# Patient Record
Sex: FEMALE | Race: BLACK OR AFRICAN AMERICAN | Employment: FULL TIME | URBAN - METROPOLITAN AREA
[De-identification: names, ages, dates, MRNs, and addresses within clinical notes are randomized per-mention and may not be internally consistent; named-entity substitution may affect disease eponyms.]

---

## 2017-04-25 ENCOUNTER — HOSPITAL ENCOUNTER (EMERGENCY)
Facility: HOSPITAL | Age: 52
Discharge: HOME | End: 2017-04-25
Payer: MEDICAID

## 2017-04-25 VITALS
RESPIRATION RATE: 18 BRPM | DIASTOLIC BLOOD PRESSURE: 70 MMHG | TEMPERATURE: 98 F | BODY MASS INDEX: 36.96 KG/M2 | SYSTOLIC BLOOD PRESSURE: 134 MMHG | HEIGHT: 66 IN | WEIGHT: 230 LBS | HEART RATE: 64 BPM | OXYGEN SATURATION: 100 %

## 2017-04-25 DIAGNOSIS — M54.16 LUMBAR RADICULOPATHY: Primary | ICD-10-CM

## 2017-04-25 PROCEDURE — 99283 EMERGENCY DEPT VISIT LOW MDM: CPT

## 2017-04-25 RX ORDER — PREDNISONE 20 MG/1
60 TABLET ORAL DAILY
Qty: 12 TABLET | Refills: 0 | Status: SHIPPED | OUTPATIENT
Start: 2017-04-25 | End: 2017-04-29

## 2017-04-25 RX ORDER — CYCLOBENZAPRINE HCL 10 MG
10 TABLET ORAL 3 TIMES DAILY PRN
Qty: 15 TABLET | Refills: 0 | Status: SHIPPED | OUTPATIENT
Start: 2017-04-25 | End: 2017-05-02

## 2017-04-25 NOTE — ED PROVIDER NOTES
Patient Seen in: [facility] AND [facility] Emergency Department    History   CC: back pain  HPI: Marthaaugie Walden 54year old female  who presents to the ER c/o left-sided lower lumbar back pain that radiates into her left buttocks and down her left leg.   State clear bilaterally and wob unlabored, good aeration with equal, even expansion bilaterally   CV - RRR, no murmur  GI - Appears round and flat, BS +x4 quadrants, No tenderness/guarding with palpation, - rebound tenderness, - McBurneys, - Rovsings   - no CV

## 2017-04-25 NOTE — ED NOTES
Pt here for low back pain that goes from right lower back down her left leg to her left shin. Pt denies any injury. Pt describes pain as \"nerve pain\". Pt states pain is making it hard to sit or bed, feels better when she is standing.  Pt denies any incont

## 2017-05-10 ENCOUNTER — APPOINTMENT (OUTPATIENT)
Dept: GENERAL RADIOLOGY | Facility: HOSPITAL | Age: 52
End: 2017-05-10
Attending: EMERGENCY MEDICINE
Payer: MEDICAID

## 2017-05-10 ENCOUNTER — HOSPITAL ENCOUNTER (EMERGENCY)
Facility: HOSPITAL | Age: 52
Discharge: HOME | End: 2017-05-10
Attending: EMERGENCY MEDICINE
Payer: MEDICAID

## 2017-05-10 VITALS
SYSTOLIC BLOOD PRESSURE: 145 MMHG | DIASTOLIC BLOOD PRESSURE: 94 MMHG | WEIGHT: 230 LBS | HEIGHT: 67 IN | RESPIRATION RATE: 16 BRPM | HEART RATE: 65 BPM | TEMPERATURE: 98 F | OXYGEN SATURATION: 99 % | BODY MASS INDEX: 36.1 KG/M2

## 2017-05-10 DIAGNOSIS — M65.321 TRIGGER INDEX FINGER OF RIGHT HAND: Primary | ICD-10-CM

## 2017-05-10 PROCEDURE — 73130 X-RAY EXAM OF HAND: CPT | Performed by: EMERGENCY MEDICINE

## 2017-05-10 PROCEDURE — 99283 EMERGENCY DEPT VISIT LOW MDM: CPT

## 2017-05-10 NOTE — ED PROVIDER NOTES
Patient Seen in: [facility] AND [facility] Emergency Department    History   Patient presents with:  Numbness Weakness (neurologic)    Stated Complaint: Numbness    HPI    The patient is a 16-year-old female whose right index finger locked in flexion eladia patel erythema. Nursing note and vitals reviewed.       Differential diagnosis includes trigger finger, sprain, arthritis    ED Course   Labs Reviewed - No data to display    MDM           Disposition and Plan     Clinical Impression:  Trigger index finger of r

## 2017-08-01 NOTE — LETTER
2/1/2022            Re:  Yahaira Pryor        [address]         [facility] [address]         To whom it may concern    Ms. Yahaira Pryor  May not return to work at this time until further evaluation. Sincerely,          Evan Guallpa MD  [address] [place] [address]  [address] [place] [address]  [id number] [place] [id number] [phone]        Document electronically generated by:  Jude Jefferson MD Removal of Femoral Sheath    Pulses in the (right/left) lower extremity are (palpable/audible by doppler/absent). The patient was placed in the supine position. The insertion site was identified and the sutures were removed per protocol.  The _6___ Malay femoral sheath was then removed. Direct pressure was applied for  __20____ minutes.     Monitoring of the (right/left) groin and both lower extremities including neuro-vascular checks and vital signs every 15 minutes x 4, then every 30 minutes x 2, then every 1 hour was ordered.    Complications: None/Other    Comments: procedural results and activity restrictions explained to patient and family. Right groin site is WDL, no evidence of hematoma/ swelling/ bleeding at the site. +2 DP pulse in BL LE, warm to touch, + sensation. Follow activity restrictions as ordered.

## 2017-11-01 NOTE — LETTER
20          Rosalina Galvez  :  1965      To Whom It May Concern: This patient was seen in our office on 20. Work status:  Remain off work until seen by hand surgeon.         If this office may be of further assistance, please do 97

## 2018-03-06 ENCOUNTER — HOSPITAL ENCOUNTER (EMERGENCY)
Facility: HOSPITAL | Age: 53
Discharge: HOME | End: 2018-03-06
Attending: EMERGENCY MEDICINE
Payer: COMMERCIAL

## 2018-03-06 ENCOUNTER — APPOINTMENT (OUTPATIENT)
Dept: ULTRASOUND IMAGING | Facility: HOSPITAL | Age: 53
End: 2018-03-06
Attending: EMERGENCY MEDICINE
Payer: COMMERCIAL

## 2018-03-06 VITALS
OXYGEN SATURATION: 99 % | HEIGHT: 66 IN | TEMPERATURE: 98 F | HEART RATE: 68 BPM | BODY MASS INDEX: 36.96 KG/M2 | WEIGHT: 230 LBS | SYSTOLIC BLOOD PRESSURE: 143 MMHG | RESPIRATION RATE: 18 BRPM | DIASTOLIC BLOOD PRESSURE: 84 MMHG

## 2018-03-06 DIAGNOSIS — G43.809 OTHER MIGRAINE WITHOUT STATUS MIGRAINOSUS, NOT INTRACTABLE: ICD-10-CM

## 2018-03-06 DIAGNOSIS — R23.3 SPONTANEOUS ECCHYMOSIS: Primary | ICD-10-CM

## 2018-03-06 PROCEDURE — 93971 EXTREMITY STUDY: CPT | Performed by: EMERGENCY MEDICINE

## 2018-03-06 PROCEDURE — 99284 EMERGENCY DEPT VISIT MOD MDM: CPT

## 2018-03-06 PROCEDURE — 96372 THER/PROPH/DIAG INJ SC/IM: CPT

## 2018-03-06 RX ORDER — KETOROLAC TROMETHAMINE 30 MG/ML
60 INJECTION, SOLUTION INTRAMUSCULAR; INTRAVENOUS ONCE
Status: COMPLETED | OUTPATIENT
Start: 2018-03-06 | End: 2018-03-06

## 2018-03-06 RX ORDER — BUTALBITAL, ACETAMINOPHEN AND CAFFEINE 50; 325; 40 MG/1; MG/1; MG/1
1 TABLET ORAL EVERY 4 HOURS PRN
Qty: 20 TABLET | Refills: 0 | Status: SHIPPED | OUTPATIENT
Start: 2018-03-06 | End: 2020-09-29

## 2018-03-06 RX ORDER — DIPHENHYDRAMINE HCL 25 MG
25 CAPSULE ORAL ONCE
Status: COMPLETED | OUTPATIENT
Start: 2018-03-06 | End: 2018-03-06

## 2018-03-06 RX ORDER — ONDANSETRON 4 MG/1
4 TABLET, ORALLY DISINTEGRATING ORAL ONCE
Status: COMPLETED | OUTPATIENT
Start: 2018-03-06 | End: 2018-03-06

## 2018-03-07 NOTE — ED PROVIDER NOTES
Patient Seen in: [facility] AND [facility] Emergency Department    History   Patient presents with:  Deep Vein Thrombosis (cardiovascular)      HPI    Patient presents to the ED complaining of bruising and pain to her right calf area as well as a current migrain distal pulses. Pulmonary/Chest: Effort normal. No stridor. No respiratory distress. Abdominal: Soft. She exhibits no distension. Musculoskeletal: She exhibits no edema, tenderness or deformity. Neurological: She is alert. She has normal strength. migraine headache and concern for a possible DVT. Ultrasound negative for DVT, likely spontaneous ecchymosis. Patient given medication in the ED and migraine headache improved. Stable for discharge home with PMD follow-up.     Additional verbal instructi

## 2018-03-07 NOTE — ED NOTES
Pt reports \"blood clot\" to right  Calf. Pt has bruising in this area. No swelling or tenderness noted. Also c/o migraine with blurred vision, hx migraines, started this morning no relief with motrin.

## 2018-08-18 ENCOUNTER — APPOINTMENT (OUTPATIENT)
Dept: GENERAL RADIOLOGY | Facility: HOSPITAL | Age: 53
End: 2018-08-18
Attending: EMERGENCY MEDICINE
Payer: COMMERCIAL

## 2018-08-18 ENCOUNTER — HOSPITAL ENCOUNTER (EMERGENCY)
Facility: HOSPITAL | Age: 53
Discharge: HOME | End: 2018-08-18
Attending: EMERGENCY MEDICINE
Payer: COMMERCIAL

## 2018-08-18 VITALS
TEMPERATURE: 99 F | RESPIRATION RATE: 18 BRPM | OXYGEN SATURATION: 99 % | SYSTOLIC BLOOD PRESSURE: 137 MMHG | HEART RATE: 66 BPM | DIASTOLIC BLOOD PRESSURE: 81 MMHG

## 2018-08-18 DIAGNOSIS — S76.012A HIP STRAIN, LEFT, INITIAL ENCOUNTER: ICD-10-CM

## 2018-08-18 DIAGNOSIS — R07.89 CHEST PAIN, ATYPICAL: Primary | ICD-10-CM

## 2018-08-18 LAB
ANION GAP SERPL CALC-SCNC: 7 MMOL/L (ref 0–18)
BASOPHILS # BLD: 0.1 K/UL (ref 0–0.2)
BASOPHILS NFR BLD: 1 %
BUN SERPL-MCNC: 3 MG/DL (ref 8–20)
BUN/CREAT SERPL: 4.3 (ref 10–20)
CALCIUM SERPL-MCNC: 8.9 MG/DL (ref 8.5–10.5)
CHLORIDE SERPL-SCNC: 106 MMOL/L (ref 95–110)
CO2 SERPL-SCNC: 28 MMOL/L (ref 22–32)
CREAT SERPL-MCNC: 0.7 MG/DL (ref 0.5–1.5)
EOSINOPHIL # BLD: 0.3 K/UL (ref 0–0.7)
EOSINOPHIL NFR BLD: 4 %
ERYTHROCYTE [DISTWIDTH] IN BLOOD BY AUTOMATED COUNT: 13.5 % (ref 11–15)
GLUCOSE SERPL-MCNC: 116 MG/DL (ref 70–99)
HCT VFR BLD AUTO: 42 % (ref 35–48)
HGB BLD-MCNC: 14.7 G/DL (ref 12–16)
LYMPHOCYTES # BLD: 2.5 K/UL (ref 1–4)
LYMPHOCYTES NFR BLD: 40 %
MCH RBC QN AUTO: 31.2 PG (ref 27–32)
MCHC RBC AUTO-ENTMCNC: 34.9 G/DL (ref 32–37)
MCV RBC AUTO: 89.4 FL (ref 80–100)
MONOCYTES # BLD: 0.4 K/UL (ref 0–1)
MONOCYTES NFR BLD: 6 %
NEUTROPHILS # BLD AUTO: 3.1 K/UL (ref 1.8–7.7)
NEUTROPHILS NFR BLD: 49 %
OSMOLALITY UR CALC.SUM OF ELEC: 290 MOSM/KG (ref 275–295)
PLATELET # BLD AUTO: 237 K/UL (ref 140–400)
PMV BLD AUTO: 7.4 FL (ref 7.4–10.3)
POTASSIUM SERPL-SCNC: 3.7 MMOL/L (ref 3.3–5.1)
RBC # BLD AUTO: 4.69 M/UL (ref 3.7–5.4)
SODIUM SERPL-SCNC: 141 MMOL/L (ref 136–144)
TROPONIN I SERPL-MCNC: 0 NG/ML (ref ?–0.03)
WBC # BLD AUTO: 6.4 K/UL (ref 4–11)

## 2018-08-18 PROCEDURE — 84484 ASSAY OF TROPONIN QUANT: CPT | Performed by: EMERGENCY MEDICINE

## 2018-08-18 PROCEDURE — 80048 BASIC METABOLIC PNL TOTAL CA: CPT | Performed by: EMERGENCY MEDICINE

## 2018-08-18 PROCEDURE — 85025 COMPLETE CBC W/AUTO DIFF WBC: CPT | Performed by: EMERGENCY MEDICINE

## 2018-08-18 PROCEDURE — 36415 COLL VENOUS BLD VENIPUNCTURE: CPT

## 2018-08-18 PROCEDURE — 71045 X-RAY EXAM CHEST 1 VIEW: CPT | Performed by: EMERGENCY MEDICINE

## 2018-08-18 PROCEDURE — 93005 ELECTROCARDIOGRAM TRACING: CPT

## 2018-08-18 PROCEDURE — 99285 EMERGENCY DEPT VISIT HI MDM: CPT

## 2018-08-18 PROCEDURE — 93010 ELECTROCARDIOGRAM REPORT: CPT | Performed by: EMERGENCY MEDICINE

## 2018-08-18 RX ORDER — IBUPROFEN 600 MG/1
600 TABLET ORAL EVERY 8 HOURS PRN
Qty: 30 TABLET | Refills: 0 | Status: SHIPPED | OUTPATIENT
Start: 2018-08-18 | End: 2018-08-25

## 2018-08-19 NOTE — ED PROVIDER NOTES
Patient Seen in: [facility] AND [facility] Emergency Department    History   Patient presents with:  Chest Pain Angina (cardiovascular)    Stated Complaint: chest pain    HPI    70-year-old female with past medical history significant for thyroid disease present CV: s1s2+, RRR, no m/r/g, normal distal pulses  Pulmonary/Chest: CTA b/l with no rales, wheezes. No chest wall tenderness  Abdominal: Nontender. Nondistended. Soft. Bowel sounds are normal.   Back:   :    Musculoskeletal: Left hip with mild tenderness No consolidated pneumonia or pulmonary edema. No pleural effusion or pneumothorax. Osseous structures do not demonstrate any displaced fractures. The results were faxed directly to the ED at 10:25 PM Central standard time.  If you would like to dis

## 2018-11-26 ENCOUNTER — HOSPITAL ENCOUNTER (EMERGENCY)
Facility: HOSPITAL | Age: 53
Discharge: HOME | End: 2018-11-26
Attending: EMERGENCY MEDICINE
Payer: COMMERCIAL

## 2018-11-26 VITALS
BODY MASS INDEX: 32.62 KG/M2 | WEIGHT: 203 LBS | RESPIRATION RATE: 18 BRPM | TEMPERATURE: 98 F | OXYGEN SATURATION: 97 % | SYSTOLIC BLOOD PRESSURE: 128 MMHG | HEIGHT: 66 IN | HEART RATE: 77 BPM | DIASTOLIC BLOOD PRESSURE: 82 MMHG

## 2018-11-26 DIAGNOSIS — N93.8 DYSFUNCTIONAL UTERINE BLEEDING: Primary | ICD-10-CM

## 2018-11-26 DIAGNOSIS — N30.00 ACUTE CYSTITIS WITHOUT HEMATURIA: ICD-10-CM

## 2018-11-26 PROCEDURE — 85025 COMPLETE CBC W/AUTO DIFF WBC: CPT | Performed by: EMERGENCY MEDICINE

## 2018-11-26 PROCEDURE — 87086 URINE CULTURE/COLONY COUNT: CPT | Performed by: EMERGENCY MEDICINE

## 2018-11-26 PROCEDURE — 99284 EMERGENCY DEPT VISIT MOD MDM: CPT

## 2018-11-26 PROCEDURE — 87077 CULTURE AEROBIC IDENTIFY: CPT | Performed by: EMERGENCY MEDICINE

## 2018-11-26 PROCEDURE — 87186 SC STD MICRODIL/AGAR DIL: CPT | Performed by: EMERGENCY MEDICINE

## 2018-11-26 PROCEDURE — 81001 URINALYSIS AUTO W/SCOPE: CPT | Performed by: EMERGENCY MEDICINE

## 2018-11-26 PROCEDURE — 96374 THER/PROPH/DIAG INJ IV PUSH: CPT

## 2018-11-26 PROCEDURE — 80048 BASIC METABOLIC PNL TOTAL CA: CPT | Performed by: EMERGENCY MEDICINE

## 2018-11-26 RX ORDER — MORPHINE SULFATE 4 MG/ML
4 INJECTION, SOLUTION INTRAMUSCULAR; INTRAVENOUS ONCE
Status: DISCONTINUED | OUTPATIENT
Start: 2018-11-26 | End: 2018-11-26

## 2018-11-26 RX ORDER — MORPHINE SULFATE 4 MG/ML
INJECTION, SOLUTION INTRAMUSCULAR; INTRAVENOUS
Status: DISCONTINUED
Start: 2018-11-26 | End: 2018-11-26

## 2018-11-26 RX ORDER — NITROFURANTOIN 25; 75 MG/1; MG/1
100 CAPSULE ORAL 2 TIMES DAILY
Qty: 10 CAPSULE | Refills: 0 | Status: SHIPPED | OUTPATIENT
Start: 2018-11-26 | End: 2018-12-01

## 2018-11-26 RX ORDER — MORPHINE SULFATE 4 MG/ML
4 INJECTION, SOLUTION INTRAMUSCULAR; INTRAVENOUS ONCE
Status: COMPLETED | OUTPATIENT
Start: 2018-11-26 | End: 2018-11-26

## 2018-11-26 NOTE — ED PROVIDER NOTES
Patient Seen in: [facility] AND [facility] Emergency Department    History   Patient presents with:  Abdomen/Flank Pain (GI/)    Stated Complaint: Lower Abd Pain w/Vaginal Bleeding    HPI    61-year-old female with past medical history significant for hypothyr Normal range of motion. Neck supple. No tracheal deviation present. CV: s1s2+, RRR, no m/r/g, normal distal pulses  Pulmonary/Chest: CTA b/l with no rales, wheezes. No chest wall tenderness  Abdominal: Nontender. Nondistended. Soft.  Bowel sounds are no GOLD   RAINBOW DRAW LAVENDER TALL (BNP)   URINE CULTURE, ROUTINE                MDM   Patient's workup is relatively unremarkable. She does have a nitrite positive urinary tract infection. Will place on antibiotics.   Patient is comfortable with discharge

## 2018-11-26 NOTE — ED INITIAL ASSESSMENT (HPI)
Lower abd pain and lower back pain with vaginal bleeding onset this am. States it is heavy bleeding. Denies dysuria.

## 2019-01-15 ENCOUNTER — HOSPITAL ENCOUNTER (EMERGENCY)
Facility: HOSPITAL | Age: 54
Discharge: HOME | End: 2019-01-16
Attending: EMERGENCY MEDICINE
Payer: OTHER MISCELLANEOUS

## 2019-01-15 ENCOUNTER — APPOINTMENT (OUTPATIENT)
Dept: GENERAL RADIOLOGY | Facility: HOSPITAL | Age: 54
End: 2019-01-15
Attending: EMERGENCY MEDICINE
Payer: OTHER MISCELLANEOUS

## 2019-01-15 DIAGNOSIS — S80.02XA CONTUSION OF LEFT KNEE, INITIAL ENCOUNTER: Primary | ICD-10-CM

## 2019-01-15 PROCEDURE — 99283 EMERGENCY DEPT VISIT LOW MDM: CPT

## 2019-01-15 PROCEDURE — 73560 X-RAY EXAM OF KNEE 1 OR 2: CPT | Performed by: EMERGENCY MEDICINE

## 2019-01-15 RX ORDER — IBUPROFEN 600 MG/1
600 TABLET ORAL ONCE
Status: COMPLETED | OUTPATIENT
Start: 2019-01-15 | End: 2019-01-15

## 2019-01-16 VITALS
WEIGHT: 220 LBS | BODY MASS INDEX: 35.36 KG/M2 | TEMPERATURE: 98 F | HEART RATE: 63 BPM | OXYGEN SATURATION: 99 % | RESPIRATION RATE: 16 BRPM | HEIGHT: 66 IN | SYSTOLIC BLOOD PRESSURE: 147 MMHG | DIASTOLIC BLOOD PRESSURE: 83 MMHG

## 2019-01-16 NOTE — ED NOTES
Care assumed from triage. Pt presents with c/o L knee injury after falling at work. Pt states that she was picking up a box when she lost her balance and fell forward. Denies head injury, denies LOC. CMS intact to LLE, + peripheral pulses.  + abrasion to L

## 2019-01-16 NOTE — ED PROVIDER NOTES
Patient Seen in: [facility] AND [facility] Emergency Department    History   Patient presents with:  Fall (musculoskeletal, neurologic)    Stated Complaint: fall    HPI    Patient is a 24-year-old female presents with left knee pain status post fall at work 3 ho of left knee, initial encounter  (primary encounter diagnosis)    Disposition:  Discharge  1/16/2019 12:11 am    Follow-up:  MD Neetu Ngo Dr [id number] [phone]              Medications Prescribed:  Current [address]

## 2019-04-01 ENCOUNTER — APPOINTMENT (OUTPATIENT)
Dept: GENERAL RADIOLOGY | Facility: HOSPITAL | Age: 54
End: 2019-04-01
Attending: EMERGENCY MEDICINE
Payer: COMMERCIAL

## 2019-04-01 ENCOUNTER — HOSPITAL ENCOUNTER (EMERGENCY)
Facility: HOSPITAL | Age: 54
Discharge: HOME | End: 2019-04-02
Attending: EMERGENCY MEDICINE
Payer: COMMERCIAL

## 2019-04-01 DIAGNOSIS — J18.0 BRONCHOPNEUMONIA: Primary | ICD-10-CM

## 2019-04-01 PROCEDURE — 85025 COMPLETE CBC W/AUTO DIFF WBC: CPT | Performed by: EMERGENCY MEDICINE

## 2019-04-01 PROCEDURE — 99284 EMERGENCY DEPT VISIT MOD MDM: CPT | Performed by: EMERGENCY MEDICINE

## 2019-04-01 PROCEDURE — 71045 X-RAY EXAM CHEST 1 VIEW: CPT | Performed by: EMERGENCY MEDICINE

## 2019-04-01 PROCEDURE — 96361 HYDRATE IV INFUSION ADD-ON: CPT | Performed by: EMERGENCY MEDICINE

## 2019-04-01 PROCEDURE — 81001 URINALYSIS AUTO W/SCOPE: CPT | Performed by: EMERGENCY MEDICINE

## 2019-04-01 PROCEDURE — 80048 BASIC METABOLIC PNL TOTAL CA: CPT | Performed by: EMERGENCY MEDICINE

## 2019-04-01 PROCEDURE — 87430 STREP A AG IA: CPT

## 2019-04-01 PROCEDURE — 96365 THER/PROPH/DIAG IV INF INIT: CPT | Performed by: EMERGENCY MEDICINE

## 2019-04-01 RX ORDER — IBUPROFEN 600 MG/1
600 TABLET ORAL ONCE
Status: COMPLETED | OUTPATIENT
Start: 2019-04-01 | End: 2019-04-01

## 2019-04-01 RX ORDER — LEVOFLOXACIN 5 MG/ML
750 INJECTION, SOLUTION INTRAVENOUS ONCE
Status: COMPLETED | OUTPATIENT
Start: 2019-04-01 | End: 2019-04-02

## 2019-04-01 RX ORDER — LEVOFLOXACIN 750 MG/1
750 TABLET ORAL DAILY
Qty: 10 TABLET | Refills: 0 | Status: SHIPPED | OUTPATIENT
Start: 2019-04-01 | End: 2019-04-11

## 2019-04-02 VITALS
DIASTOLIC BLOOD PRESSURE: 83 MMHG | RESPIRATION RATE: 18 BRPM | BODY MASS INDEX: 32.62 KG/M2 | OXYGEN SATURATION: 97 % | HEIGHT: 66 IN | SYSTOLIC BLOOD PRESSURE: 117 MMHG | TEMPERATURE: 100 F | WEIGHT: 203 LBS | HEART RATE: 61 BPM

## 2019-04-02 NOTE — ED PROVIDER NOTES
Patient Seen in: [facility] AND [facility] Emergency Department    History   Patient presents with:  Cough/URI    Stated Complaint: cold symptoms abd pain     HPI    47 yo F without PMH presenting for evaluation of two days of rhinorrhea, cough, sore throat, inter Pulmonary/Chest: Effort normal. Bibasilar rhonchi. Abdominal: Soft. Nontender. Musculoskeletal: No gross deformity. Neurological: Alert. Skin: Skin is warm. Psychiatric: Cooperative. Nursing note and vitals reviewed.         ED Course     Labs Rev which it is addressed. If you are not the intended recipient of this report, you are hereby notified that any copying, distribution, dissemination or action taken in relation to the contents of this report is strictly prohibited and may be unlawful.  If you

## 2019-04-02 NOTE — ED NOTES
Discharge instructions reviewed. Pt verbalized understanding with no further questions. Script given to patient. Pain well controlled. Steady gait. Speaking in full sentences.

## 2019-08-13 ENCOUNTER — TELEPHONE (OUTPATIENT)
Dept: SCHEDULING | Age: 54
End: 2019-08-13

## 2020-04-16 ENCOUNTER — HOSPITAL ENCOUNTER (EMERGENCY)
Facility: HOSPITAL | Age: 55
Discharge: HOME | End: 2020-04-16
Payer: COMMERCIAL

## 2020-04-16 ENCOUNTER — APPOINTMENT (OUTPATIENT)
Dept: GENERAL RADIOLOGY | Facility: HOSPITAL | Age: 55
End: 2020-04-16
Payer: COMMERCIAL

## 2020-04-16 VITALS
WEIGHT: 206 LBS | BODY MASS INDEX: 32.33 KG/M2 | OXYGEN SATURATION: 99 % | DIASTOLIC BLOOD PRESSURE: 88 MMHG | SYSTOLIC BLOOD PRESSURE: 137 MMHG | RESPIRATION RATE: 18 BRPM | HEIGHT: 67 IN | TEMPERATURE: 98 F | HEART RATE: 59 BPM

## 2020-04-16 DIAGNOSIS — R07.9 CHEST PAIN, UNSPECIFIED TYPE: Primary | ICD-10-CM

## 2020-04-16 PROCEDURE — 93010 ELECTROCARDIOGRAM REPORT: CPT | Performed by: INTERNAL MEDICINE

## 2020-04-16 PROCEDURE — 93005 ELECTROCARDIOGRAM TRACING: CPT

## 2020-04-16 PROCEDURE — 36415 COLL VENOUS BLD VENIPUNCTURE: CPT

## 2020-04-16 PROCEDURE — 71045 X-RAY EXAM CHEST 1 VIEW: CPT

## 2020-04-16 PROCEDURE — 84484 ASSAY OF TROPONIN QUANT: CPT

## 2020-04-16 PROCEDURE — 99284 EMERGENCY DEPT VISIT MOD MDM: CPT

## 2020-04-16 PROCEDURE — 80048 BASIC METABOLIC PNL TOTAL CA: CPT

## 2020-04-16 PROCEDURE — 85025 COMPLETE CBC W/AUTO DIFF WBC: CPT

## 2020-04-16 NOTE — ED PROVIDER NOTES
Patient Seen in: [facility] AND [facility] Emergency Department      History   Patient presents with:  Chest Pain Angina    Stated Complaint: chest pain    HPI    53-year-old female without significant past medical history presents with complaints of chest pain. retractions, lungs are clear to auscultation  Cardiovascular: regular rate and rhythm  Gastrointestinal:  abdomen is soft and non tender, no masses, bowel sounds normal  Neurological: Speech normal.  Moving extremities equally x4.   Skin: warm and dry, no r pm    Follow-up:  Alfredo Yadav MD  [address] [place] [address]  [phone]                Medications Prescribed:  Current Discharge Medication List

## 2020-04-16 NOTE — ED INITIAL ASSESSMENT (HPI)
Patient has intermittent chest pain that started yesterday. Denies cough, fevers, SOB, and dizziness.

## 2020-04-17 NOTE — ED NOTES
Pt a/ox4, respirations unlabored, speech full/clear, gait steady, NAD. All ED orders completed.  IV (placed in triage) was removed @ this time

## 2020-05-23 ENCOUNTER — APPOINTMENT (OUTPATIENT)
Dept: CT IMAGING | Facility: HOSPITAL | Age: 55
End: 2020-05-23
Attending: EMERGENCY MEDICINE
Payer: COMMERCIAL

## 2020-05-23 ENCOUNTER — HOSPITAL ENCOUNTER (EMERGENCY)
Facility: HOSPITAL | Age: 55
Discharge: HOME | End: 2020-05-23
Attending: EMERGENCY MEDICINE
Payer: COMMERCIAL

## 2020-05-23 ENCOUNTER — APPOINTMENT (OUTPATIENT)
Dept: GENERAL RADIOLOGY | Facility: HOSPITAL | Age: 55
End: 2020-05-23
Attending: EMERGENCY MEDICINE
Payer: COMMERCIAL

## 2020-05-23 VITALS
OXYGEN SATURATION: 100 % | RESPIRATION RATE: 16 BRPM | SYSTOLIC BLOOD PRESSURE: 144 MMHG | DIASTOLIC BLOOD PRESSURE: 76 MMHG | BODY MASS INDEX: 32 KG/M2 | WEIGHT: 206 LBS | HEART RATE: 66 BPM | TEMPERATURE: 98 F

## 2020-05-23 DIAGNOSIS — R10.9 ACUTE LEFT FLANK PAIN: Primary | ICD-10-CM

## 2020-05-23 PROCEDURE — 93005 ELECTROCARDIOGRAM TRACING: CPT

## 2020-05-23 PROCEDURE — 93010 ELECTROCARDIOGRAM REPORT: CPT | Performed by: EMERGENCY MEDICINE

## 2020-05-23 PROCEDURE — 85025 COMPLETE CBC W/AUTO DIFF WBC: CPT | Performed by: EMERGENCY MEDICINE

## 2020-05-23 PROCEDURE — 80048 BASIC METABOLIC PNL TOTAL CA: CPT | Performed by: EMERGENCY MEDICINE

## 2020-05-23 PROCEDURE — 81001 URINALYSIS AUTO W/SCOPE: CPT | Performed by: EMERGENCY MEDICINE

## 2020-05-23 PROCEDURE — 71260 CT THORAX DX C+: CPT | Performed by: EMERGENCY MEDICINE

## 2020-05-23 PROCEDURE — 74177 CT ABD & PELVIS W/CONTRAST: CPT | Performed by: EMERGENCY MEDICINE

## 2020-05-23 PROCEDURE — 84484 ASSAY OF TROPONIN QUANT: CPT | Performed by: EMERGENCY MEDICINE

## 2020-05-23 PROCEDURE — 96374 THER/PROPH/DIAG INJ IV PUSH: CPT

## 2020-05-23 PROCEDURE — 71045 X-RAY EXAM CHEST 1 VIEW: CPT | Performed by: EMERGENCY MEDICINE

## 2020-05-23 PROCEDURE — 99285 EMERGENCY DEPT VISIT HI MDM: CPT

## 2020-05-23 RX ORDER — CYCLOBENZAPRINE HCL 10 MG
10 TABLET ORAL 3 TIMES DAILY PRN
Qty: 20 TABLET | Refills: 0 | Status: SHIPPED | OUTPATIENT
Start: 2020-05-23 | End: 2020-05-30

## 2020-05-23 RX ORDER — POTASSIUM CHLORIDE 20 MEQ/1
40 TABLET, EXTENDED RELEASE ORAL ONCE
Status: COMPLETED | OUTPATIENT
Start: 2020-05-23 | End: 2020-05-23

## 2020-05-23 RX ORDER — MORPHINE SULFATE 4 MG/ML
2 INJECTION, SOLUTION INTRAMUSCULAR; INTRAVENOUS ONCE
Status: COMPLETED | OUTPATIENT
Start: 2020-05-23 | End: 2020-05-23

## 2020-05-23 RX ORDER — OMEPRAZOLE 20 MG/1
20 CAPSULE, DELAYED RELEASE ORAL DAILY
Qty: 30 CAPSULE | Refills: 0 | Status: SHIPPED | OUTPATIENT
Start: 2020-05-23 | End: 2020-06-22

## 2020-05-23 RX ORDER — HYDROCODONE BITARTRATE AND ACETAMINOPHEN 5; 325 MG/1; MG/1
1 TABLET ORAL EVERY 6 HOURS PRN
Qty: 10 TABLET | Refills: 0 | Status: SHIPPED | OUTPATIENT
Start: 2020-05-23 | End: 2020-05-30

## 2020-05-23 NOTE — ED PROVIDER NOTES
[facility] Emergency Department      History   Patient presents with:  Abdomen/Flank Pain  Urinary Symptoms    Stated Complaint: flank pain    HPI    Patient presents the emergency department complaining of left flank and left abd Normal breath sounds. Abdominal:      General: There is no distension. Palpations: Abdomen is soft. Tenderness: There is tenderness in the left upper quadrant. There is left CVA tenderness. Musculoskeletal: Normal range of motion.          Gen lower lobe. 2. Mild stable enlargement of cardiac silhouette.     Dictated by (CST): Ignacia Williamson MD on 5/23/2020 at 4:52 PM     Finalized by (CST): Ignacia Williamson MD on 5/23/2020 at 4:53 PM          Ct Chest Pe Aorta (iv Only) (cpt=71260)    Result Date: 95588  [phone]    Schedule an appointment as soon as possible for a visit            Medications Prescribed:  Current Discharge Medication List    START taking these medications    HYDROcodone-acetaminophen 5-325 MG Oral Tab  Take 1 tablet by mouth ev

## 2020-05-23 NOTE — ED NOTES
DISCHARGE INSTRUCTIONS GIVEN TO PATIENT. VERBALIZED UNDERSTANDING. NO DISTRESS.   LEFT ED AMBULATORY STEADY GAIT

## 2020-05-26 ENCOUNTER — HOSPITAL ENCOUNTER (EMERGENCY)
Facility: HOSPITAL | Age: 55
Discharge: HOME | End: 2020-05-26
Attending: PHYSICIAN ASSISTANT
Payer: COMMERCIAL

## 2020-05-26 ENCOUNTER — NURSE TRIAGE (OUTPATIENT)
Dept: FAMILY MEDICINE CLINIC | Facility: CLINIC | Age: 55
End: 2020-05-26

## 2020-05-26 ENCOUNTER — APPOINTMENT (OUTPATIENT)
Dept: CT IMAGING | Facility: HOSPITAL | Age: 55
End: 2020-05-26
Attending: PHYSICIAN ASSISTANT
Payer: COMMERCIAL

## 2020-05-26 VITALS
WEIGHT: 206 LBS | TEMPERATURE: 98 F | SYSTOLIC BLOOD PRESSURE: 147 MMHG | BODY MASS INDEX: 33.11 KG/M2 | HEIGHT: 66 IN | HEART RATE: 75 BPM | OXYGEN SATURATION: 100 % | RESPIRATION RATE: 18 BRPM | DIASTOLIC BLOOD PRESSURE: 84 MMHG

## 2020-05-26 DIAGNOSIS — R03.0 ELEVATED BLOOD PRESSURE READING: ICD-10-CM

## 2020-05-26 DIAGNOSIS — R42 LIGHTHEADEDNESS: ICD-10-CM

## 2020-05-26 DIAGNOSIS — R10.9 LEFT FLANK PAIN: Primary | ICD-10-CM

## 2020-05-26 DIAGNOSIS — R93.89 ABNORMAL CT SCAN: ICD-10-CM

## 2020-05-26 PROCEDURE — 96361 HYDRATE IV INFUSION ADD-ON: CPT

## 2020-05-26 PROCEDURE — 96374 THER/PROPH/DIAG INJ IV PUSH: CPT

## 2020-05-26 PROCEDURE — 93010 ELECTROCARDIOGRAM REPORT: CPT | Performed by: PHYSICIAN ASSISTANT

## 2020-05-26 PROCEDURE — 99284 EMERGENCY DEPT VISIT MOD MDM: CPT

## 2020-05-26 PROCEDURE — 80048 BASIC METABOLIC PNL TOTAL CA: CPT | Performed by: PHYSICIAN ASSISTANT

## 2020-05-26 PROCEDURE — 93005 ELECTROCARDIOGRAM TRACING: CPT

## 2020-05-26 PROCEDURE — 81001 URINALYSIS AUTO W/SCOPE: CPT | Performed by: PHYSICIAN ASSISTANT

## 2020-05-26 PROCEDURE — 84484 ASSAY OF TROPONIN QUANT: CPT | Performed by: PHYSICIAN ASSISTANT

## 2020-05-26 PROCEDURE — 74176 CT ABD & PELVIS W/O CONTRAST: CPT | Performed by: PHYSICIAN ASSISTANT

## 2020-05-26 PROCEDURE — 85025 COMPLETE CBC W/AUTO DIFF WBC: CPT | Performed by: PHYSICIAN ASSISTANT

## 2020-05-26 RX ORDER — KETOROLAC TROMETHAMINE 15 MG/ML
15 INJECTION, SOLUTION INTRAMUSCULAR; INTRAVENOUS ONCE
Status: COMPLETED | OUTPATIENT
Start: 2020-05-26 | End: 2020-05-26

## 2020-05-26 RX ORDER — DIAZEPAM 5 MG/1
5 TABLET ORAL ONCE
Status: COMPLETED | OUTPATIENT
Start: 2020-05-26 | End: 2020-05-26

## 2020-05-26 NOTE — ED PROVIDER NOTES
Patient Seen in: [facility] AND [facility] Emergency Department    History   Patient presents with:  Urinary Symptoms    Stated Complaint:     DELROY Moon is a 48year old female who presents with chief complaint of left flank pain.   Onset 1 week ag Smokeless tobacco: Never Used    Alcohol use: Never      Frequency: Never    Drug use: Never      Review of Systems    Positive for stated complaint:   Other systems are as noted in HPI. Constitutional and vital signs reviewed.       All other systems revi nontender to palpation. Neurological: Cranial nerve II through XII intact. DTRs intact and symmetrical bilaterally. Bowel function is intact. Sensation intact to light touch. Strength and range of motion symmetrical of all extremities x4.  Patient exhibits Mild hepatomegaly. 5. Small to moderate hiatal hernia. 6. Probable uterine fibroids. 7. Nonspecific retrocrural soft tissue thickening possibly retrocrural lymphadenopathy-unchanged. Consider a follow-up CT scan in 6 months.     Dictated by (CST): Tesha Calvo

## 2020-05-26 NOTE — TELEPHONE ENCOUNTER
Action Requested: Summary for Provider     []  Critical Lab, Recommendations Needed  [] Need Additional Advice  []   FYI    []   Need Orders  [] Need Medications Sent to Pharmacy  []  Other     SUMMARY: Per protocol advised ER now.  Pt states will be taken

## 2020-05-27 ENCOUNTER — TELEPHONE (OUTPATIENT)
Dept: FAMILY MEDICINE CLINIC | Facility: CLINIC | Age: 55
End: 2020-05-27

## 2020-05-27 NOTE — TELEPHONE ENCOUNTER
Pt calling to schedule ER f/u. Pt is new to clinic and prefers to see Dr Remonia Kehr. Please advise if pt needs in office eval or virtual.  If in office required is ok to put pt in virtual slot for 5/28/20 2:15    Please see ER encounter and advise on ER f/u.

## 2020-05-27 NOTE — TELEPHONE ENCOUNTER
ER notes reviewed  Please have patient come in office tomorrow 30 mins  May use my virtual appointment slot

## 2020-05-28 ENCOUNTER — OFFICE VISIT (OUTPATIENT)
Dept: FAMILY MEDICINE CLINIC | Facility: CLINIC | Age: 55
End: 2020-05-28
Payer: COMMERCIAL

## 2020-05-28 VITALS
DIASTOLIC BLOOD PRESSURE: 89 MMHG | RESPIRATION RATE: 16 BRPM | TEMPERATURE: 96 F | SYSTOLIC BLOOD PRESSURE: 144 MMHG | BODY MASS INDEX: 35.84 KG/M2 | WEIGHT: 223 LBS | HEART RATE: 67 BPM | HEIGHT: 66 IN

## 2020-05-28 DIAGNOSIS — R10.9 LEFT FLANK PAIN: Primary | ICD-10-CM

## 2020-05-28 DIAGNOSIS — K44.9 HIATAL HERNIA WITHOUT GANGRENE OR OBSTRUCTION: ICD-10-CM

## 2020-05-28 DIAGNOSIS — N20.0 RIGHT NEPHROLITHIASIS: ICD-10-CM

## 2020-05-28 DIAGNOSIS — R35.0 URINARY FREQUENCY: ICD-10-CM

## 2020-05-28 DIAGNOSIS — Z12.11 COLON CANCER SCREENING: ICD-10-CM

## 2020-05-28 DIAGNOSIS — R82.90 ABNORMAL URINE FINDINGS: ICD-10-CM

## 2020-05-28 DIAGNOSIS — R93.5 ABNORMAL CT OF THE ABDOMEN: ICD-10-CM

## 2020-05-28 DIAGNOSIS — J90 PLEURAL EFFUSION, LEFT: ICD-10-CM

## 2020-05-28 PROCEDURE — 81002 URINALYSIS NONAUTO W/O SCOPE: CPT | Performed by: FAMILY MEDICINE

## 2020-05-28 PROCEDURE — 99203 OFFICE O/P NEW LOW 30 MIN: CPT | Performed by: FAMILY MEDICINE

## 2020-05-28 RX ORDER — CIPROFLOXACIN 500 MG/1
500 TABLET, FILM COATED ORAL 2 TIMES DAILY
Qty: 14 TABLET | Refills: 0 | Status: SHIPPED | OUTPATIENT
Start: 2020-05-28 | End: 2020-06-04

## 2020-05-28 NOTE — PROGRESS NOTES
HPI:    Patient ID: Shawn Li is a 48year old female. HPI  Patient presents with:  Er F/u: Patient present for ER follow up    Shawn Li is a 48year old female who presents with chief complaint of left flank pain. Onset 1 week ago.   P Smokeless tobacco: Never Used    Alcohol use: Never      Frequency: Never    Drug use: Never        Review of Systems     Positive for stated complaint:   Other systems are as noted in HPI.   Constitutional and vital signs reviewed.       All other systems and lumbar spine nontender to palpation. Neurological: Cranial nerve II through XII intact. DTRs intact and symmetrical bilaterally. Bowel function is intact. Sensation intact to light touch. Strength and range of motion symmetrical of all extremities x4. or scarring in both lower lungs. 4. Mild hepatic steatosis. Mild hepatomegaly. 5. Small to moderate hiatal hernia. 6. Probable uterine fibroids. 7. Nonspecific retrocrural soft tissue thickening possibly retrocrural lymphadenopathy-unchanged.   Consider a she is in a laying position due to the pain. Denies any cough  She notices sometimes she gets a little winded when she walks and feels this might be new. She is a non-smoker. Review of Systems   Constitutional: Negative for chills and fever.    Eli Briseno breath sounds normal. She has no wheezes. Abdominal: Soft. Bowel sounds are normal. She exhibits no mass. There is no splenomegaly or hepatomegaly. There is tenderness in the left upper quadrant. There is no rebound and no guarding.               ASSESSME Tab 14 tablet 0     Sig: Take 1 tablet (500 mg total) by mouth 2 (two) times daily for 7 days.        Imaging & Referrals:  [facility] - INTERNAL  CT CHEST(CONTRAST ONLY) (CPT=71260)       #[id number]

## 2020-06-06 ENCOUNTER — OFFICE VISIT (OUTPATIENT)
Dept: FAMILY MEDICINE CLINIC | Facility: CLINIC | Age: 55
End: 2020-06-06
Payer: COMMERCIAL

## 2020-06-06 VITALS
DIASTOLIC BLOOD PRESSURE: 89 MMHG | TEMPERATURE: 98 F | WEIGHT: 209 LBS | HEIGHT: 66 IN | HEART RATE: 74 BPM | BODY MASS INDEX: 33.59 KG/M2 | SYSTOLIC BLOOD PRESSURE: 134 MMHG

## 2020-06-06 DIAGNOSIS — R93.5 ABNORMAL CT OF THE ABDOMEN: ICD-10-CM

## 2020-06-06 DIAGNOSIS — R35.0 URINARY FREQUENCY: ICD-10-CM

## 2020-06-06 DIAGNOSIS — J90 PLEURAL EFFUSION, LEFT: ICD-10-CM

## 2020-06-06 DIAGNOSIS — R10.12 CONTINUOUS LEFT UPPER QUADRANT PAIN: Primary | ICD-10-CM

## 2020-06-06 DIAGNOSIS — R10.9 LEFT FLANK PAIN: ICD-10-CM

## 2020-06-06 PROCEDURE — 99214 OFFICE O/P EST MOD 30 MIN: CPT | Performed by: FAMILY MEDICINE

## 2020-06-06 NOTE — PROGRESS NOTES
HPI:    Patient ID: Renata Crum is a 48year old female. HPI  Patient presents with: Follow - Up: Left flank pain still c/o pain sometimes gets worst     Patient to follow-up on her left sided flank pain.   Patient states the pain is the same and well-developed and well-nourished. Cardiovascular: Normal rate, regular rhythm and normal heart sounds. Pulmonary/Chest: Effort normal and breath sounds normal.   Abdominal: Soft. Bowel sounds are normal. She exhibits no distension and no mass.  There i

## 2020-06-08 ENCOUNTER — TELEPHONE (OUTPATIENT)
Dept: FAMILY MEDICINE CLINIC | Facility: CLINIC | Age: 55
End: 2020-06-08

## 2020-06-08 NOTE — TELEPHONE ENCOUNTER
Lord Teran from [facility] is requesting to fax the CT Chest (contrast) order to [facility] at #[phone]. Thank you.

## 2020-06-13 ENCOUNTER — HOSPITAL ENCOUNTER (OUTPATIENT)
Dept: CT IMAGING | Facility: HOSPITAL | Age: 55
Discharge: HOME | End: 2020-06-13
Attending: FAMILY MEDICINE
Payer: COMMERCIAL

## 2020-06-13 DIAGNOSIS — J90 PLEURAL EFFUSION, LEFT: ICD-10-CM

## 2020-06-13 DIAGNOSIS — R93.5 ABNORMAL CT OF THE ABDOMEN: ICD-10-CM

## 2020-06-13 PROCEDURE — 71260 CT THORAX DX C+: CPT | Performed by: FAMILY MEDICINE

## 2020-06-14 DIAGNOSIS — R10.12 CONTINUOUS LEFT UPPER QUADRANT PAIN: ICD-10-CM

## 2020-06-14 DIAGNOSIS — R10.9 LEFT FLANK PAIN: Primary | ICD-10-CM

## 2020-06-14 DIAGNOSIS — R93.89 ABNORMAL CT OF THE CHEST: ICD-10-CM

## 2020-08-10 ENCOUNTER — OFFICE VISIT (OUTPATIENT)
Dept: FAMILY MEDICINE CLINIC | Facility: CLINIC | Age: 55
End: 2020-08-10
Payer: COMMERCIAL

## 2020-08-10 VITALS
BODY MASS INDEX: 34.07 KG/M2 | WEIGHT: 212 LBS | SYSTOLIC BLOOD PRESSURE: 114 MMHG | HEIGHT: 66 IN | DIASTOLIC BLOOD PRESSURE: 78 MMHG | TEMPERATURE: 98 F | HEART RATE: 67 BPM

## 2020-08-10 DIAGNOSIS — M79.641 RIGHT HAND PAIN: Primary | ICD-10-CM

## 2020-08-10 DIAGNOSIS — M79.644 PAIN OF RIGHT THUMB: ICD-10-CM

## 2020-08-10 DIAGNOSIS — R93.5 ABNORMAL CT OF THE ABDOMEN: ICD-10-CM

## 2020-08-10 DIAGNOSIS — M79.671 PAIN OF RIGHT HEEL: ICD-10-CM

## 2020-08-10 PROCEDURE — 99214 OFFICE O/P EST MOD 30 MIN: CPT | Performed by: FAMILY MEDICINE

## 2020-08-10 PROCEDURE — 3074F SYST BP LT 130 MM HG: CPT | Performed by: FAMILY MEDICINE

## 2020-08-10 PROCEDURE — 3008F BODY MASS INDEX DOCD: CPT | Performed by: FAMILY MEDICINE

## 2020-08-10 PROCEDURE — 3078F DIAST BP <80 MM HG: CPT | Performed by: FAMILY MEDICINE

## 2020-08-10 RX ORDER — NAPROXEN 500 MG/1
500 TABLET ORAL 2 TIMES DAILY WITH MEALS
Qty: 60 TABLET | Refills: 0 | Status: SHIPPED | OUTPATIENT
Start: 2020-08-10 | End: 2020-08-14

## 2020-08-10 NOTE — PROGRESS NOTES
HPI:    Patient ID: Nishant Johnson is a 48year old female.     HPI  Patient presents with:  Hand Pain: paindul when closing and opening the hand X 2 weeks getting worst pt states she lifts heavy  at work   Heel Pain: right heel hard to walk on it X 1 we hand she has limited range of motion. Very tender at the base of the right thumb. She is unable to completely flex and make a fist with the right hand. Poor . No wrist tenderness. Also tenderness in her right heel. No swelling noted.

## 2020-08-10 NOTE — PATIENT INSTRUCTIONS
Plantar Fasciitis  Plantar fasciitis is a painful swelling of the plantar fascia. The plantar fascia is a thick, fibrous layer of tissue that covers the bones on the bottom of your foot. It supports the foot bones in an arched position.   Plantar fasciiti · First thing in the morning and before sports, stretch the bottom of your feet. Gently flex your ankle so the toes move toward your knee. · Icing may help control heel pain. Apply an ice pack to the heel for 10 to 20 minutes as a preventive.  Or ice your · To lessen severe pain and swelling, your healthcare provider may give you pills or injections. In some cases, you may need a walking cast. Physical therapy, such as ultrasound or a daily stretching program, may also be recommended.  Surgery is rarely need

## 2020-08-11 ENCOUNTER — HOSPITAL ENCOUNTER (OUTPATIENT)
Dept: GENERAL RADIOLOGY | Age: 55
Discharge: HOME | End: 2020-08-11
Attending: FAMILY MEDICINE
Payer: COMMERCIAL

## 2020-08-11 DIAGNOSIS — M79.641 RIGHT HAND PAIN: ICD-10-CM

## 2020-08-11 DIAGNOSIS — M79.671 PAIN OF RIGHT HEEL: ICD-10-CM

## 2020-08-11 PROCEDURE — 73130 X-RAY EXAM OF HAND: CPT | Performed by: FAMILY MEDICINE

## 2020-08-11 PROCEDURE — 73630 X-RAY EXAM OF FOOT: CPT | Performed by: FAMILY MEDICINE

## 2020-08-13 ENCOUNTER — OFFICE VISIT (OUTPATIENT)
Dept: SURGERY | Facility: CLINIC | Age: 55
End: 2020-08-13
Payer: COMMERCIAL

## 2020-08-13 ENCOUNTER — TELEPHONE (OUTPATIENT)
Dept: SURGERY | Facility: CLINIC | Age: 55
End: 2020-08-13

## 2020-08-13 DIAGNOSIS — M79.641 PAIN IN RIGHT HAND: Primary | ICD-10-CM

## 2020-08-13 DIAGNOSIS — R20.0 BILATERAL HAND NUMBNESS: ICD-10-CM

## 2020-08-13 PROCEDURE — L3908 WHO COCK-UP NONMOLDE PRE OTS: HCPCS | Performed by: PLASTIC SURGERY

## 2020-08-13 PROCEDURE — 99203 OFFICE O/P NEW LOW 30 MIN: CPT | Performed by: PLASTIC SURGERY

## 2020-08-13 NOTE — TELEPHONE ENCOUNTER
Left message for patient to come to the office to receive bilateral wrist cock-up splints as per MD.

## 2020-08-13 NOTE — H&P
Alexy Murphy is a 48year old female that presents with Patient presents with:  Pain: RH      REFERRED BY:  Fabio Guallpa      Pacemaker: No  Latex Allergy: no  Coumadin: No  Plavix: No  Other anticoagulants: No  Cardiac stents: No    HAND DOMINANCE:  R (500 mg total) by mouth 2 (two) times daily with meals. 60 tablet 0   • Butalbital-APAP-Caffeine -40 MG Oral Tab Take 1 tablet by mouth every 4 (four) hours as needed for Headaches.  20 tablet 0        SOCIAL HISTORY  Social History    Tobacco Use

## 2020-08-14 DIAGNOSIS — M79.641 RIGHT HAND PAIN: ICD-10-CM

## 2020-08-14 DIAGNOSIS — M79.671 PAIN OF RIGHT HEEL: ICD-10-CM

## 2020-08-17 RX ORDER — NAPROXEN 500 MG/1
500 TABLET ORAL 2 TIMES DAILY WITH MEALS
Qty: 60 TABLET | Refills: 0 | Status: SHIPPED | OUTPATIENT
Start: 2020-08-17 | End: 2020-09-29

## 2020-08-25 ENCOUNTER — TELEPHONE (OUTPATIENT)
Dept: ADMINISTRATIVE | Age: 55
End: 2020-08-25

## 2020-08-25 ENCOUNTER — PATIENT MESSAGE (OUTPATIENT)
Dept: FAMILY MEDICINE CLINIC | Facility: CLINIC | Age: 55
End: 2020-08-25

## 2020-08-25 ENCOUNTER — TELEPHONE (OUTPATIENT)
Dept: FAMILY MEDICINE CLINIC | Facility: CLINIC | Age: 55
End: 2020-08-25

## 2020-08-25 DIAGNOSIS — Z12.31 ENCOUNTER FOR SCREENING MAMMOGRAM FOR BREAST CANCER: Primary | ICD-10-CM

## 2020-08-25 NOTE — TELEPHONE ENCOUNTER
From: Jennie Murrieta  To: Leigh Ann Santana. Polina Page MD  Sent: 8/25/2020 7:02 AM CDT  Subject: Other    I left paperwork to be filled out Friday 8/21/20 from my employer at  can you let me know if they were filled out& faxed over or should I .

## 2020-08-25 NOTE — TELEPHONE ENCOUNTER
Patient dropped off form on Friday and Form protocol was not followed and form was misplaced, Form sent to forms, [facility] message sent for HIPAA.

## 2020-08-26 NOTE — TELEPHONE ENCOUNTER
ARETHA and susanne completed and faxed to benefit dept [phone]. Pt gave consent via [employer]  Pt is aware

## 2020-08-26 NOTE — TELEPHONE ENCOUNTER
Dr. Shannan Randall,     Please sign off on 2 forms:   **FMLA and short term disability**    -Highlight the patient and hit \"Chart\" button.   -In Chart Review, w/in the Encounter tab - click 1 time on the Telephone call encounter for 8/25/2020 Scroll down the teleph

## 2020-08-27 NOTE — TELEPHONE ENCOUNTER
Called spoke with pt in regards to forms.  Pt states they are [facility] forms which were given to fmla dept with lauren pt states she received a call yesterday form dept

## 2020-09-11 ENCOUNTER — HOSPITAL ENCOUNTER (OUTPATIENT)
Dept: MAMMOGRAPHY | Age: 55
Discharge: HOME | End: 2020-09-11
Attending: FAMILY MEDICINE
Payer: COMMERCIAL

## 2020-09-11 DIAGNOSIS — Z12.31 ENCOUNTER FOR SCREENING MAMMOGRAM FOR BREAST CANCER: ICD-10-CM

## 2020-09-11 PROCEDURE — 77063 BREAST TOMOSYNTHESIS BI: CPT | Performed by: FAMILY MEDICINE

## 2020-09-11 PROCEDURE — 77067 SCR MAMMO BI INCL CAD: CPT | Performed by: FAMILY MEDICINE

## 2020-09-29 ENCOUNTER — OFFICE VISIT (OUTPATIENT)
Dept: NEUROLOGY | Facility: CLINIC | Age: 55
End: 2020-09-29
Payer: COMMERCIAL

## 2020-09-29 VITALS — HEIGHT: 66 IN | BODY MASS INDEX: 34.23 KG/M2 | WEIGHT: 213 LBS

## 2020-09-29 DIAGNOSIS — G56.03 BILATERAL CARPAL TUNNEL SYNDROME: Primary | ICD-10-CM

## 2020-09-29 PROCEDURE — 99204 OFFICE O/P NEW MOD 45 MIN: CPT | Performed by: PHYSICAL MEDICINE & REHABILITATION

## 2020-09-29 PROCEDURE — 3008F BODY MASS INDEX DOCD: CPT | Performed by: PHYSICAL MEDICINE & REHABILITATION

## 2020-09-29 RX ORDER — CYCLOBENZAPRINE HCL 10 MG
10 TABLET ORAL NIGHTLY
COMMUNITY
End: 2021-01-13

## 2020-09-29 RX ORDER — PREGABALIN 75 MG/1
75 CAPSULE ORAL 2 TIMES DAILY
Qty: 60 CAPSULE | Refills: 0 | Status: SHIPPED | OUTPATIENT
Start: 2020-09-29 | End: 2020-11-11

## 2020-09-29 NOTE — PATIENT INSTRUCTIONS
-Get carpal tunnel braces for both hands  -EMG test to be completed  -We can discuss further treatment options after EMG test  -Start Lyrica 75mg twice daily

## 2020-09-29 NOTE — PROGRESS NOTES
[address] [facility]  NEW PATIENT EVALUATION    Consultation as a request of Dr. Yudelka Buchanan    Chief Complaint: Hand pain    HISTORY OF PRESENT ILLNESS:   Patient presents with:  Hand Pain: Patient presents today c/o cyclobenzaprine 10 MG Oral Tab Take 10 mg by mouth nightly. • pregabalin (LYRICA) 75 MG Oral Cap Take 1 capsule (75 mg total) by mouth 2 (two) times daily.  60 capsule 0         ALLERGIES:   No Known Allergies      FAMILY HISTORY:     Family History   P lower extremity edema bilaterally   Skin: No lesions noted   Cognition: alert & oriented x 3, attentive, able to follow 2 step commands, comprehention intact, spontaneous speech intact  Psychiatric: Mood and affect appropriate    Gait Normal    Musculoskel Additionally I recommend that she obtain carpal tunnel splints and wear them at night for the next 4 to 6 weeks. I have also prescribed her Lyrica 75 mg to be taken twice daily.   If she does not have sufficient improvement, she may need ultrasound-guided

## 2020-10-23 ENCOUNTER — PROCEDURE VISIT (OUTPATIENT)
Dept: NEUROLOGY | Facility: CLINIC | Age: 55
End: 2020-10-23
Payer: COMMERCIAL

## 2020-10-23 DIAGNOSIS — G56.03 BILATERAL CARPAL TUNNEL SYNDROME: Primary | ICD-10-CM

## 2020-10-23 PROCEDURE — 95885 MUSC TST DONE W/NERV TST LIM: CPT | Performed by: PHYSICAL MEDICINE & REHABILITATION

## 2020-10-23 PROCEDURE — 95912 NRV CNDJ TEST 11-12 STUDIES: CPT | Performed by: PHYSICAL MEDICINE & REHABILITATION

## 2020-10-23 NOTE — PROCEDURES
[address]   Nerve Conduction Study and Electromyography Procedure Note    Patient: Daron Lindsey Hand Dominance:  right   Patient ID: [id number] Referring Dr:  Dr. Leida Sanchez   Sex: Female Test Dr:  Dr. Leida Sanchez L Radial - Anatomical snuff box (Forearm)      Forearm Wrist 1.93 2.40 20.2 44.6 Forearm - Wrist 10 52   L Dorsal ulnar cutaneous - Hand dorsum (Forearm)      Forearm Hand dorsum 1.93 2.45 13.3 17.3 Forearm - Hand dorsum 10 52       EMG Summary Table     S The needle EMG examination was performed in 3 muscles. It was normal in 1 muscle(s): R. Deltoid. The study was abnormal in 2 muscle(s), with the following distribution:  ? Abnormal spontaneous/insertional activity was found in R.  Abductor pollicis brevis,

## 2020-10-28 ENCOUNTER — PATIENT MESSAGE (OUTPATIENT)
Dept: NEUROLOGY | Facility: CLINIC | Age: 55
End: 2020-10-28

## 2020-10-28 DIAGNOSIS — G56.03 BILATERAL CARPAL TUNNEL SYNDROME: Primary | ICD-10-CM

## 2020-10-28 NOTE — TELEPHONE ENCOUNTER
From: Surya Gardner  To: Kiley Pearce.  Essence Ramey DO  Sent: 10/28/2020 5:09 PM CDT  Subject: Visit Follow-up Question    Dr Essence Ramey after my visit with you on 10/23/20 the pain in my right hand has worsened its really hard for me to work can't stand for anything

## 2020-10-29 ENCOUNTER — TELEPHONE (OUTPATIENT)
Dept: NEUROLOGY | Facility: CLINIC | Age: 55
End: 2020-10-29

## 2020-10-29 NOTE — TELEPHONE ENCOUNTER
Patient has been scheduled on 11/4/20 at 10:30a for Ultrasound guided bilateral carpal tunnel injection

## 2020-10-29 NOTE — TELEPHONE ENCOUNTER
Called [facility] for authorization of approval of Ultrasound guided bilateral carpal tunnel injection  cpt codes 99650, 70482,( if needed) X 2. Talked to [address] [place] [address]. who states authorization is not required. Reference #  [id number]. Will  inform Nursing.

## 2020-10-29 NOTE — TELEPHONE ENCOUNTER
Please advise patient that she can have injection procedure for both median nerve sheath under ultrasound guidance. I placed the order and she can schedule an appointment to have this procedure done.   If she is not interested in any injection, she can see

## 2020-11-04 ENCOUNTER — OFFICE VISIT (OUTPATIENT)
Dept: NEUROLOGY | Facility: CLINIC | Age: 55
End: 2020-11-04
Payer: COMMERCIAL

## 2020-11-04 DIAGNOSIS — G56.03 BILATERAL CARPAL TUNNEL SYNDROME: Primary | ICD-10-CM

## 2020-11-04 PROCEDURE — 20526 THER INJECTION CARP TUNNEL: CPT | Performed by: PHYSICAL MEDICINE & REHABILITATION

## 2020-11-04 PROCEDURE — 76942 ECHO GUIDE FOR BIOPSY: CPT | Performed by: PHYSICAL MEDICINE & REHABILITATION

## 2020-11-04 RX ORDER — LIDOCAINE HYDROCHLORIDE 10 MG/ML
1 INJECTION, SOLUTION INFILTRATION; PERINEURAL ONCE
Status: CANCELLED | OUTPATIENT
Start: 2020-11-04 | End: 2020-11-04

## 2020-11-04 RX ORDER — LIDOCAINE HYDROCHLORIDE 10 MG/ML
2 INJECTION, SOLUTION INFILTRATION; PERINEURAL ONCE
Status: CANCELLED | OUTPATIENT
Start: 2020-11-04 | End: 2020-11-04

## 2020-11-04 RX ORDER — LIDOCAINE HYDROCHLORIDE 10 MG/ML
1 INJECTION, SOLUTION INFILTRATION; PERINEURAL ONCE
Status: COMPLETED | OUTPATIENT
Start: 2020-11-04 | End: 2020-11-04

## 2020-11-04 RX ORDER — TRIAMCINOLONE ACETONIDE 40 MG/ML
40 INJECTION, SUSPENSION INTRA-ARTICULAR; INTRAMUSCULAR ONCE
Status: COMPLETED | OUTPATIENT
Start: 2020-11-04 | End: 2020-11-04

## 2020-11-04 RX ADMIN — LIDOCAINE HYDROCHLORIDE 1 ML: 10 INJECTION, SOLUTION INFILTRATION; PERINEURAL at 10:45:00

## 2020-11-04 RX ADMIN — TRIAMCINOLONE ACETONIDE 40 MG: 40 INJECTION, SUSPENSION INTRA-ARTICULAR; INTRAMUSCULAR at 10:46:00

## 2020-11-04 RX ADMIN — TRIAMCINOLONE ACETONIDE 40 MG: 40 INJECTION, SUSPENSION INTRA-ARTICULAR; INTRAMUSCULAR at 10:47:00

## 2020-11-05 NOTE — PROGRESS NOTES
Procedure:  Ultrasound guided bilateral carpal tunnel injection  The risks, benefits and anticipated outcomes of the procedure, the risks and benefits of the alternatives to the procedure, and the roles and tasks of the personnel to be involved, were discu

## 2020-11-09 ENCOUNTER — PATIENT MESSAGE (OUTPATIENT)
Dept: FAMILY MEDICINE CLINIC | Facility: CLINIC | Age: 55
End: 2020-11-09

## 2020-11-10 NOTE — TELEPHONE ENCOUNTER
From: Reinier Benites  To: David Garcia.  Sharlet Boxer, MD  Sent: 11/9/2020 10:15 PM CST  Subject: Visit Follow-up Question    Can I schedule appointment for Wednesday morning

## 2020-11-10 NOTE — TELEPHONE ENCOUNTER
To: Cristal Javier      From: Debora Sandhu RN      Created: 11/10/2020 8:48 AM        Trent Wallace, please call [phone], press 1 then press 1 for help with scheduling an appointment.  Thank you, Cee Kulkarni

## 2020-11-12 RX ORDER — PREGABALIN 75 MG/1
75 CAPSULE ORAL 2 TIMES DAILY
Qty: 60 CAPSULE | Refills: 0 | Status: SHIPPED | OUTPATIENT
Start: 2020-11-12 | End: 2020-12-08

## 2020-11-12 NOTE — TELEPHONE ENCOUNTER
Refill request for lyrica 75 mg, BID, #60, no refills    LOV: 11/4/20  NOV: none  Last refilled on 9/29/20

## 2020-11-18 ENCOUNTER — OFFICE VISIT (OUTPATIENT)
Dept: NEUROLOGY | Facility: CLINIC | Age: 55
End: 2020-11-18
Payer: COMMERCIAL

## 2020-11-18 DIAGNOSIS — G56.03 BILATERAL CARPAL TUNNEL SYNDROME: Primary | ICD-10-CM

## 2020-11-18 PROCEDURE — 99214 OFFICE O/P EST MOD 30 MIN: CPT | Performed by: PHYSICAL MEDICINE & REHABILITATION

## 2020-11-18 NOTE — PROGRESS NOTES
[address]  FOLLOW UP EVALUATION      Chief Complaint: Hand pain    HISTORY OF PRESENT ILLNESS:   Patient presents with:  Hand Pain: pt here for f/u s/p Ultrasound guided bilateral carpal tunnel injection 11 physiatry. She denies any neck pain radiating distally but does notice some proximal radiation from her hands to her forearms. She did notices weakness with  strength and has a difficult time making a fist with the right hand.   She has been wearing a Atraumatic  Eyes: Extra-occular movements intact. Ears: No auricular hematoma or deformities  Mouth: No lesions or ulcerations  Heart: peripheral pulses intact. Normal capillary refill.    Lungs: Non-labored respirations  Abdomen: No abdominal guarding  E ongoing for the last several months. She has had EMG testing which is notable for carpal tunnel syndrome. She has not noticed any improvement with ultrasound-guided injections, home exercises, Lyrica medication.   At this time, I recommend that she reeval

## 2020-11-19 ENCOUNTER — OFFICE VISIT (OUTPATIENT)
Dept: FAMILY MEDICINE CLINIC | Facility: CLINIC | Age: 55
End: 2020-11-19
Payer: COMMERCIAL

## 2020-11-19 VITALS
SYSTOLIC BLOOD PRESSURE: 134 MMHG | DIASTOLIC BLOOD PRESSURE: 88 MMHG | TEMPERATURE: 98 F | HEIGHT: 66 IN | BODY MASS INDEX: 35.03 KG/M2 | HEART RATE: 70 BPM | WEIGHT: 218 LBS

## 2020-11-19 DIAGNOSIS — Z80.3 FH: BREAST CANCER IN FIRST DEGREE RELATIVE: ICD-10-CM

## 2020-11-19 DIAGNOSIS — E66.9 OBESITY (BMI 30-39.9): ICD-10-CM

## 2020-11-19 DIAGNOSIS — Z01.419 ENCOUNTER FOR GYNECOLOGICAL EXAMINATION: ICD-10-CM

## 2020-11-19 DIAGNOSIS — Z13.1 SCREENING FOR DIABETES MELLITUS: ICD-10-CM

## 2020-11-19 DIAGNOSIS — E55.9 VITAMIN D DEFICIENCY: ICD-10-CM

## 2020-11-19 DIAGNOSIS — Z00.00 WELL ADULT EXAM: Primary | ICD-10-CM

## 2020-11-19 DIAGNOSIS — Z12.11 COLON CANCER SCREENING: ICD-10-CM

## 2020-11-19 DIAGNOSIS — G56.03 BILATERAL CARPAL TUNNEL SYNDROME: ICD-10-CM

## 2020-11-19 DIAGNOSIS — K44.9 HIATAL HERNIA WITHOUT GANGRENE OR OBSTRUCTION: ICD-10-CM

## 2020-11-19 DIAGNOSIS — R93.5 ABNORMAL CT OF THE ABDOMEN: ICD-10-CM

## 2020-11-19 PROCEDURE — 99396 PREV VISIT EST AGE 40-64: CPT | Performed by: FAMILY MEDICINE

## 2020-11-19 PROCEDURE — 3008F BODY MASS INDEX DOCD: CPT | Performed by: FAMILY MEDICINE

## 2020-11-19 PROCEDURE — 3075F SYST BP GE 130 - 139MM HG: CPT | Performed by: FAMILY MEDICINE

## 2020-11-19 PROCEDURE — 3079F DIAST BP 80-89 MM HG: CPT | Performed by: FAMILY MEDICINE

## 2020-11-19 NOTE — PROGRESS NOTES
HPI:    Patient ID: Liset Johnson is a 48year old female. HPI  Patient presents with:  Routine Physical  Pap    Patient here for routine physical.  She is currently seeing specialist for her carpal tunnel syndrome bilaterally.   She is on short-term kg/m²     History reviewed. No pertinent past medical history. History reviewed. No pertinent surgical history.   Social History    Socioeconomic History      Marital status: Single      Spouse name: Not on file      Number of children: Not on file      Faheem Oliver Social History Narrative      Not on file    Family History   Problem Relation Age of Onset   • Breast Cancer Mother 61         There is no immunization history on file for this patient.     Annual Physical due on 12/23/1970  Pap Smear,3 Years due on 12/23/ discharge and no friability. Right adnexum displays no mass, no tenderness and no fullness. Left adnexum displays no mass, no tenderness and no fullness. No vaginal discharge.       Genitourinary Comments: Pap done     Musculoskeletal:         General: N cancer in first degree relative    - OP REFERRAL TO GENETIC COUNSELOR    5. Colon cancer screening    - GASTRO - INTERNAL    6. Hiatal hernia without gangrene or obstruction  asymptomatic    7.  Abnormal CT of the abdomen  Recheck CT abd/ pelvis  Order repr

## 2020-11-23 ENCOUNTER — OFFICE VISIT (OUTPATIENT)
Dept: SURGERY | Facility: CLINIC | Age: 55
End: 2020-11-23
Payer: COMMERCIAL

## 2020-11-23 DIAGNOSIS — G56.03 BILATERAL CARPAL TUNNEL SYNDROME: Primary | ICD-10-CM

## 2020-11-23 PROCEDURE — 99214 OFFICE O/P EST MOD 30 MIN: CPT | Performed by: PLASTIC SURGERY

## 2020-11-23 RX ORDER — HYDROCODONE BITARTRATE AND ACETAMINOPHEN 7.5; 325 MG/1; MG/1
1 TABLET ORAL
Qty: 10 TABLET | Refills: 0 | Status: SHIPPED | OUTPATIENT
Start: 2020-11-23 | End: 2021-02-04

## 2020-11-23 NOTE — H&P
Pacemaker: No   Latex Allergy: no   Coumadin: No   Plavix: No   Other anticoagulants: No   Cardiac stents: No     HAND DOMINANCE: Right   Profession:      RECONSTRUCTIVE HISTORY     SUN EXPOSURE   Current no   Past no   Sunburns no   Tann intact/symmetric               Wrist Tenderness: bilateral none     Tinel median wrist: Bilateral negative  Phalen: Bilateral negative     Two-point discrimination 9 right thumb, 7 all other digits bilaterally       UPDATED [facility]       MEDICAL  History revie treatment. This is a severe carpal tunnel syndrome. Because of the severity (see Severity Index) of this carpal tunnel, non-operative treatment is not indicated. This requires surgery.   We discussed the procedure at length, including risks as indic

## 2020-11-23 NOTE — PROGRESS NOTES
Patient here for follow-up/ review of EMG test for bilateral hand carpal tunnel   Symptoms the same as last office visit on 8/13/20  Complaints of intermittent \"electrical shock\" rates pain 10/10   Right hand is worst than left hand   Patient had bilater

## 2020-11-24 ENCOUNTER — TELEPHONE (OUTPATIENT)
Dept: ADMINISTRATIVE | Age: 55
End: 2020-11-24

## 2020-11-25 ENCOUNTER — TELEPHONE (OUTPATIENT)
Dept: SURGERY | Facility: CLINIC | Age: 55
End: 2020-11-25

## 2020-11-25 NOTE — TELEPHONE ENCOUNTER
Regarding: FW: Other  Contact: [phone]  What is her current work restriction? Is she off work completely? Whatever her current restriction is, you can fill out a work status, sign my name and initial it.     Please place a copy on my desk, so I can

## 2020-11-30 ENCOUNTER — TELEPHONE (OUTPATIENT)
Dept: SURGERY | Facility: CLINIC | Age: 55
End: 2020-11-30

## 2020-11-30 DIAGNOSIS — G56.01 CARPAL TUNNEL SYNDROME ON RIGHT: Primary | ICD-10-CM

## 2020-11-30 NOTE — TELEPHONE ENCOUNTER
Pt brought Short Term Disability Forms to  [facility] for Dr Elisabeth Deleon to complete. Pt signed HIPPA and pd $25 fee. Scanned to OTIS and brought originals to OTIS @ [facility].

## 2020-11-30 NOTE — TELEPHONE ENCOUNTER
Surgery is scheduled on 1/15/21. Insurance is [facility]. Thank you!

## 2020-12-04 NOTE — TELEPHONE ENCOUNTER
Dr. Jones Cover,    **3 forms needing signature**     Please sign off on form: Fmla, Disab x2  -Highlight the patient and hit \"Chart\" button.   -In Chart Review, w/in the Encounter tab - click 1 time on the Telephone call encounter for 11/30/2020 Scroll do

## 2020-12-07 NOTE — TELEPHONE ENCOUNTER
Fmla forms completed and faxed to Benefits Dept at [phone] and Disab forms completed and faxed to [facility] at [phone]. Sent pt [employer].

## 2020-12-08 RX ORDER — PREGABALIN 75 MG/1
75 CAPSULE ORAL 2 TIMES DAILY
Qty: 60 CAPSULE | Refills: 0 | Status: SHIPPED | OUTPATIENT
Start: 2020-12-08 | End: 2021-01-03

## 2020-12-08 NOTE — TELEPHONE ENCOUNTER
Medication request:  Pregabalin 75mg cap. Take 1 cap PO BID. LOV: 11/18/2020  NOV: No scheduled office visit.      [place]: 11/12/2020 #60 Refill 0

## 2020-12-16 ENCOUNTER — PATIENT MESSAGE (OUTPATIENT)
Dept: NEUROLOGY | Facility: CLINIC | Age: 55
End: 2020-12-16

## 2020-12-16 NOTE — TELEPHONE ENCOUNTER
From: Vane Cortez  To: Kavya Quintero.  Cherylene League, DO  Sent: 12/16/2020 8:32 AM CST  Subject: Non-Urgent Medical Question    To Dr Cherylene League a form was sent to you by my insurance [facility] one December 1,&December 4 to be completed can you respond back because that's t

## 2020-12-18 ENCOUNTER — PATIENT MESSAGE (OUTPATIENT)
Dept: SURGERY | Facility: CLINIC | Age: 55
End: 2020-12-18

## 2020-12-18 NOTE — TELEPHONE ENCOUNTER
From: Leela Rodriguez  To: Marina Valle MD  Sent: 12/18/2020 11:22 AM CST  Subject: Non-Urgent Medical Question    Did [employer] reach back out for needed information to complete my pending STD

## 2020-12-21 NOTE — TELEPHONE ENCOUNTER
Additional disab forms received in forms dept. Logged for processing. Tra Emmanuel attached as well.

## 2020-12-22 NOTE — TELEPHONE ENCOUNTER
Patient states she needs to be off work since she is a  and works with her hands which causes severe pain. Patient states she has surgery with Dr. Jazmin White on 01/15/21 and he has recommended she remain off work.  Informed patient that we can fill

## 2020-12-30 ENCOUNTER — TELEPHONE (OUTPATIENT)
Dept: SURGERY | Facility: CLINIC | Age: 55
End: 2020-12-30

## 2020-12-30 NOTE — H&P
NEEDS LECTR      Pacemaker: No   Latex Allergy: no   Coumadin: No   Plavix: No   Other anticoagulants: No   Cardiac stents: No     HAND DOMINANCE: Right   Profession:      RECONSTRUCTIVE HISTORY     SUN EXPOSURE   Current no   Past no   S Ulnar Intrinsics: bilateral intact/symmetric               Wrist Tenderness: bilateral none     Tinel median wrist: Bilateral negative  Phalen: Bilateral negative     Two-point discrimination 9 right thumb, 7 all other digits bilaterally       UPDATED PMH discussion. I explained to the patient what carpal tunnel syndrome is including treatment options. The patient  may not have relief of symptoms with treatment. This is a severe carpal tunnel syndrome.     Because of the severity (see Severity Index)

## 2020-12-30 NOTE — TELEPHONE ENCOUNTER
Spoke with patient. All changes to medications and allergies, per patient report, have been documented in the medical record. Patient  has not been ill, hospitalized, or had any surgical procedures since last seen in our office on 11/23/20.

## 2020-12-31 NOTE — TELEPHONE ENCOUNTER
Dr. Jazmin White,      Please sign off on form:  -Highlight the patient and hit \"Chart\" button.   -In Chart Review, w/in the Encounter tab - click 1 time on the Telephone call encounter for 11/30/20 Scroll down the telephone encounter.  -Click \"scan on\" b

## 2021-01-04 RX ORDER — PREGABALIN 75 MG/1
CAPSULE ORAL
Qty: 60 CAPSULE | Refills: 0 | OUTPATIENT
Start: 2021-01-04

## 2021-01-04 RX ORDER — PREGABALIN 75 MG/1
75 CAPSULE ORAL 2 TIMES DAILY
Qty: 60 CAPSULE | Refills: 0 | OUTPATIENT
Start: 2021-01-04

## 2021-01-04 RX ORDER — PREGABALIN 75 MG/1
75 CAPSULE ORAL 2 TIMES DAILY
Qty: 60 CAPSULE | Refills: 0 | Status: SHIPPED | OUTPATIENT
Start: 2021-01-04 | End: 2021-02-02

## 2021-01-04 NOTE — TELEPHONE ENCOUNTER
Medication request: Pregabalin 75 MG    Take 1 capsule (75 mg total) by mouth 2 (two) times daily.     LOV: 11/18/2020  NOV: N/A    [facility] #[facility] r-0

## 2021-01-12 ENCOUNTER — LAB ENCOUNTER (OUTPATIENT)
Dept: LAB | Age: 56
End: 2021-01-12
Attending: PLASTIC SURGERY
Payer: COMMERCIAL

## 2021-01-12 DIAGNOSIS — Z01.818 PREOP TESTING: ICD-10-CM

## 2021-01-13 ENCOUNTER — NURSE ONLY (OUTPATIENT)
Dept: HEMATOLOGY/ONCOLOGY | Facility: HOSPITAL | Age: 56
End: 2021-01-13
Attending: GENETIC COUNSELOR, MS
Payer: COMMERCIAL

## 2021-01-13 ENCOUNTER — GENETICS ENCOUNTER (OUTPATIENT)
Dept: GENETICS | Facility: HOSPITAL | Age: 56
End: 2021-01-13
Attending: GENETIC COUNSELOR, MS
Payer: COMMERCIAL

## 2021-01-13 DIAGNOSIS — Z80.0 FHX: COLON CANCER: ICD-10-CM

## 2021-01-13 DIAGNOSIS — Z80.3 FH: BREAST CANCER IN FIRST DEGREE RELATIVE: Primary | ICD-10-CM

## 2021-01-13 LAB — SARS-COV-2 RNA RESP QL NAA+PROBE: NOT DETECTED

## 2021-01-13 PROCEDURE — 36415 COLL VENOUS BLD VENIPUNCTURE: CPT

## 2021-01-13 PROCEDURE — 96040 HC GENETIC COUNSELING EA 30 MIN: CPT | Performed by: GENETIC COUNSELOR, MS

## 2021-01-13 NOTE — PROGRESS NOTES
Reason for visit: Ms. Ayan Castro is a 51-year-old woman who was referred for genetic counseling due to her family history of early-onset breast cancer and other cancers.   She was seen today for genetic counseling and to discuss the option of genetic testing gi her first child. Ms. Catia Atkinson reports that she is post-menopausal, having undergone natural menopause at age 38 and has her ovaries and uterus intact. She denies any history of uterine fibroids, thyroid issues or dermatological issues.   She has not used hor cases of hereditary breast cancer. Mutations in other genes have also been associated with an increased risk for breast and ovarian cancer - but mutations in these other genes are statistically less often seen in hereditary breast cancer.     We discussed discrimination by life insurers, long term healthcare or disability, which are not covered by statutes yet. Due to the early-onset breast cancer cases on BOTH sides of her family, Ms. Agusto Gooden does meet NCCN criteria for genetic testing.   After discussing

## 2021-01-15 ENCOUNTER — ANESTHESIA EVENT (OUTPATIENT)
Dept: SURGERY | Facility: HOSPITAL | Age: 56
End: 2021-01-15
Payer: COMMERCIAL

## 2021-01-15 ENCOUNTER — HOSPITAL DOCUMENTATION (OUTPATIENT)
Dept: SURGERY | Facility: CLINIC | Age: 56
End: 2021-01-15

## 2021-01-15 ENCOUNTER — HOSPITAL ENCOUNTER (OUTPATIENT)
Facility: HOSPITAL | Age: 56
Setting detail: HOSPITAL OUTPATIENT SURGERY
Discharge: HOME | End: 2021-01-15
Attending: PLASTIC SURGERY | Admitting: PLASTIC SURGERY
Payer: COMMERCIAL

## 2021-01-15 ENCOUNTER — ANESTHESIA (OUTPATIENT)
Dept: SURGERY | Facility: HOSPITAL | Age: 56
End: 2021-01-15
Payer: COMMERCIAL

## 2021-01-15 VITALS
BODY MASS INDEX: 35.03 KG/M2 | DIASTOLIC BLOOD PRESSURE: 77 MMHG | WEIGHT: 218 LBS | RESPIRATION RATE: 17 BRPM | SYSTOLIC BLOOD PRESSURE: 133 MMHG | OXYGEN SATURATION: 95 % | HEIGHT: 66 IN | HEART RATE: 56 BPM | TEMPERATURE: 98 F

## 2021-01-15 DIAGNOSIS — G56.01 CARPAL TUNNEL SYNDROME ON RIGHT: Primary | ICD-10-CM

## 2021-01-15 DIAGNOSIS — Z01.818 PREOP TESTING: Primary | ICD-10-CM

## 2021-01-15 PROCEDURE — 29848 WRIST ENDOSCOPY/SURGERY: CPT | Performed by: PLASTIC SURGERY

## 2021-01-15 PROCEDURE — 01N54ZZ RELEASE MEDIAN NERVE, PERCUTANEOUS ENDOSCOPIC APPROACH: ICD-10-PCS | Performed by: PLASTIC SURGERY

## 2021-01-15 RX ORDER — PROCHLORPERAZINE EDISYLATE 5 MG/ML
5 INJECTION INTRAMUSCULAR; INTRAVENOUS ONCE AS NEEDED
Status: DISCONTINUED | OUTPATIENT
Start: 2021-01-15 | End: 2021-01-15

## 2021-01-15 RX ORDER — MORPHINE SULFATE 4 MG/ML
4 INJECTION, SOLUTION INTRAMUSCULAR; INTRAVENOUS EVERY 10 MIN PRN
Status: DISCONTINUED | OUTPATIENT
Start: 2021-01-15 | End: 2021-01-15

## 2021-01-15 RX ORDER — METOCLOPRAMIDE 10 MG/1
10 TABLET ORAL ONCE
Status: COMPLETED | OUTPATIENT
Start: 2021-01-15 | End: 2021-01-15

## 2021-01-15 RX ORDER — DEXAMETHASONE SODIUM PHOSPHATE 4 MG/ML
VIAL (ML) INJECTION AS NEEDED
Status: DISCONTINUED | OUTPATIENT
Start: 2021-01-15 | End: 2021-01-15 | Stop reason: SURG

## 2021-01-15 RX ORDER — HYDROCODONE BITARTRATE AND ACETAMINOPHEN 5; 325 MG/1; MG/1
1 TABLET ORAL AS NEEDED
Status: COMPLETED | OUTPATIENT
Start: 2021-01-15 | End: 2021-01-15

## 2021-01-15 RX ORDER — HYDROMORPHONE HYDROCHLORIDE 1 MG/ML
0.4 INJECTION, SOLUTION INTRAMUSCULAR; INTRAVENOUS; SUBCUTANEOUS EVERY 5 MIN PRN
Status: DISCONTINUED | OUTPATIENT
Start: 2021-01-15 | End: 2021-01-15

## 2021-01-15 RX ORDER — HYDROMORPHONE HYDROCHLORIDE 1 MG/ML
0.2 INJECTION, SOLUTION INTRAMUSCULAR; INTRAVENOUS; SUBCUTANEOUS EVERY 5 MIN PRN
Status: DISCONTINUED | OUTPATIENT
Start: 2021-01-15 | End: 2021-01-15

## 2021-01-15 RX ORDER — KETOROLAC TROMETHAMINE 30 MG/ML
INJECTION, SOLUTION INTRAMUSCULAR; INTRAVENOUS AS NEEDED
Status: DISCONTINUED | OUTPATIENT
Start: 2021-01-15 | End: 2021-01-15 | Stop reason: SURG

## 2021-01-15 RX ORDER — MIDAZOLAM HYDROCHLORIDE 1 MG/ML
INJECTION INTRAMUSCULAR; INTRAVENOUS AS NEEDED
Status: DISCONTINUED | OUTPATIENT
Start: 2021-01-15 | End: 2021-01-15 | Stop reason: SURG

## 2021-01-15 RX ORDER — HYDROCODONE BITARTRATE AND ACETAMINOPHEN 5; 325 MG/1; MG/1
2 TABLET ORAL AS NEEDED
Status: COMPLETED | OUTPATIENT
Start: 2021-01-15 | End: 2021-01-15

## 2021-01-15 RX ORDER — NALOXONE HYDROCHLORIDE 0.4 MG/ML
80 INJECTION, SOLUTION INTRAMUSCULAR; INTRAVENOUS; SUBCUTANEOUS AS NEEDED
Status: DISCONTINUED | OUTPATIENT
Start: 2021-01-15 | End: 2021-01-15

## 2021-01-15 RX ORDER — HYDROCODONE BITARTRATE AND ACETAMINOPHEN 7.5; 325 MG/1; MG/1
1 TABLET ORAL EVERY 4 HOURS PRN
Status: DISCONTINUED | OUTPATIENT
Start: 2021-01-15 | End: 2021-01-15

## 2021-01-15 RX ORDER — ACETAMINOPHEN 500 MG
1000 TABLET ORAL ONCE
Status: DISCONTINUED | OUTPATIENT
Start: 2021-01-15 | End: 2021-01-15 | Stop reason: HOSPADM

## 2021-01-15 RX ORDER — LIDOCAINE HYDROCHLORIDE 10 MG/ML
INJECTION, SOLUTION EPIDURAL; INFILTRATION; INTRACAUDAL; PERINEURAL AS NEEDED
Status: DISCONTINUED | OUTPATIENT
Start: 2021-01-15 | End: 2021-01-15 | Stop reason: SURG

## 2021-01-15 RX ORDER — HALOPERIDOL 5 MG/ML
0.25 INJECTION INTRAMUSCULAR ONCE AS NEEDED
Status: DISCONTINUED | OUTPATIENT
Start: 2021-01-15 | End: 2021-01-15

## 2021-01-15 RX ORDER — SODIUM CHLORIDE, SODIUM LACTATE, POTASSIUM CHLORIDE, CALCIUM CHLORIDE 600; 310; 30; 20 MG/100ML; MG/100ML; MG/100ML; MG/100ML
INJECTION, SOLUTION INTRAVENOUS CONTINUOUS
Status: DISCONTINUED | OUTPATIENT
Start: 2021-01-15 | End: 2021-01-15

## 2021-01-15 RX ORDER — MORPHINE SULFATE 10 MG/ML
6 INJECTION, SOLUTION INTRAMUSCULAR; INTRAVENOUS EVERY 10 MIN PRN
Status: DISCONTINUED | OUTPATIENT
Start: 2021-01-15 | End: 2021-01-15

## 2021-01-15 RX ORDER — ONDANSETRON 2 MG/ML
4 INJECTION INTRAMUSCULAR; INTRAVENOUS ONCE AS NEEDED
Status: DISCONTINUED | OUTPATIENT
Start: 2021-01-15 | End: 2021-01-15

## 2021-01-15 RX ORDER — ACETAMINOPHEN 500 MG
500 TABLET ORAL EVERY 6 HOURS PRN
COMMUNITY
End: 2021-02-04

## 2021-01-15 RX ORDER — ONDANSETRON 2 MG/ML
INJECTION INTRAMUSCULAR; INTRAVENOUS AS NEEDED
Status: DISCONTINUED | OUTPATIENT
Start: 2021-01-15 | End: 2021-01-15 | Stop reason: SURG

## 2021-01-15 RX ORDER — HYDROMORPHONE HYDROCHLORIDE 1 MG/ML
0.6 INJECTION, SOLUTION INTRAMUSCULAR; INTRAVENOUS; SUBCUTANEOUS EVERY 5 MIN PRN
Status: DISCONTINUED | OUTPATIENT
Start: 2021-01-15 | End: 2021-01-15

## 2021-01-15 RX ORDER — FAMOTIDINE 20 MG/1
20 TABLET ORAL ONCE
Status: COMPLETED | OUTPATIENT
Start: 2021-01-15 | End: 2021-01-15

## 2021-01-15 RX ORDER — MORPHINE SULFATE 4 MG/ML
2 INJECTION, SOLUTION INTRAMUSCULAR; INTRAVENOUS EVERY 10 MIN PRN
Status: DISCONTINUED | OUTPATIENT
Start: 2021-01-15 | End: 2021-01-15

## 2021-01-15 RX ADMIN — ONDANSETRON 4 MG: 2 INJECTION INTRAMUSCULAR; INTRAVENOUS at 10:22:00

## 2021-01-15 RX ADMIN — SODIUM CHLORIDE, SODIUM LACTATE, POTASSIUM CHLORIDE, CALCIUM CHLORIDE: 600; 310; 30; 20 INJECTION, SOLUTION INTRAVENOUS at 10:56:00

## 2021-01-15 RX ADMIN — KETOROLAC TROMETHAMINE 30 MG: 30 INJECTION, SOLUTION INTRAMUSCULAR; INTRAVENOUS at 10:55:00

## 2021-01-15 RX ADMIN — DEXAMETHASONE SODIUM PHOSPHATE 4 MG: 4 MG/ML VIAL (ML) INJECTION at 10:22:00

## 2021-01-15 RX ADMIN — MIDAZOLAM HYDROCHLORIDE 2 MG: 1 INJECTION INTRAMUSCULAR; INTRAVENOUS at 10:14:00

## 2021-01-15 RX ADMIN — LIDOCAINE HYDROCHLORIDE 50 MG: 10 INJECTION, SOLUTION EPIDURAL; INFILTRATION; INTRACAUDAL; PERINEURAL at 10:21:00

## 2021-01-15 RX ADMIN — SODIUM CHLORIDE, SODIUM LACTATE, POTASSIUM CHLORIDE, CALCIUM CHLORIDE: 600; 310; 30; 20 INJECTION, SOLUTION INTRAVENOUS at 10:14:00

## 2021-01-15 NOTE — ANESTHESIA PROCEDURE NOTES
Airway  Date/Time: 1/15/2021 10:29 AM  Urgency: elective    Airway not difficult    General Information and Staff    Patient location during procedure: OR  Anesthesiologist: Shayy Mcpherson MD  Resident/CRNA: Karson Whitaker CRNA  Performed: Bell Dubon

## 2021-01-15 NOTE — H&P
Pacemaker: No   Latex Allergy: no   Coumadin: No   Plavix: No   Other anticoagulants: No   Cardiac stents: No      HAND DOMINANCE: Right   Profession:       RECONSTRUCTIVE HISTORY      SUN EXPOSURE   Current no   Past no   Sunburns no   Tan bilaterally               Ulnar Intrinsics: bilateral intact/symmetric               Wrist Tenderness: bilateral none     Tinel median wrist: Bilateral negative  Phalen: Bilateral negative     Two-point discrimination 9 right thumb, 7 all other digits bila electrodiagnostic changes        DISPOSITION     We had a long discussion. I explained to the patient what carpal tunnel syndrome is including treatment options.   The patient  may not have relief of symptoms with treatment.        This is a severe carpal

## 2021-01-15 NOTE — INTERVAL H&P NOTE
Pre-op Diagnosis: carpal tunnel syndrome    The above referenced H&P was reviewed by Mick Juarez MD on 1/15/2021, the patient was examined and no significant changes have occurred in the patient's condition since the H&P was performed.   I discus

## 2021-01-15 NOTE — ANESTHESIA POSTPROCEDURE EVALUATION
Patient: Shawn Guillermo    Procedure Summary     Date: 01/15/21 Room / Location: [address] [place] [address] MAIN OR 01 / [address] [facility] MAIN OR    Anesthesia Start: 2406 Anesthesia Stop:     Procedure: ENDOSCOPIC CARPAL TUNNEL RELEASE (Right ) Diagnosis: (carpal tunnel syndrome)    Surg

## 2021-01-15 NOTE — OPERATIVE REPORT
[facility]    PATIENT'S NAME: Ghada Swartz   ATTENDING PHYSICIAN: Joseluis Liang MD   OPERATING PHYSICIAN: Joseluis Liang MD   PATIENT ACCOUNT#:   [id number]    LOCATION:  [address]  MEDICAL RECORD #:   [id number] excellent visualization of the dorsal surface of the transverse carpal ligament. Complete release of the transverse carpal ligament was accomplished with a push knife, a triangle knife, and a pull knife.   We had excellent herniation of palmar fat into t

## 2021-01-15 NOTE — INTERVAL H&P NOTE
Pre-op Diagnosis: carpal tunnel syndrome    The above referenced H&P was reviewed by Heaven Baldwin MD on 1/15/2021, the patient was examined and no significant changes have occurred in the patient's condition since the H&P was performed.   I discus

## 2021-01-15 NOTE — BRIEF OP NOTE
Pre-Operative Diagnosis: carpal tunnel syndrome     Post-Operative Diagnosis: carpal tunnel syndrome      Procedure Performed:   Procedure(s):  right endoscopic carpal tunnel release    Surgeon(s) and Role:     * Jeromy Schaefefr MD - Primary    Assi

## 2021-01-16 ENCOUNTER — TELEPHONE (OUTPATIENT)
Dept: SURGERY | Facility: CLINIC | Age: 56
End: 2021-01-16

## 2021-01-17 RX ORDER — HYDROCODONE BITARTRATE AND ACETAMINOPHEN 7.5; 325 MG/1; MG/1
1 TABLET ORAL
Qty: 10 TABLET | Refills: 0 | OUTPATIENT
Start: 2021-01-17

## 2021-01-18 ENCOUNTER — OFFICE VISIT (OUTPATIENT)
Dept: SURGERY | Facility: CLINIC | Age: 56
End: 2021-01-18
Payer: COMMERCIAL

## 2021-01-18 DIAGNOSIS — M25.641 JOINT STIFFNESS OF HAND, RIGHT: ICD-10-CM

## 2021-01-18 DIAGNOSIS — M62.81 DISTAL MUSCLE WEAKNESS: Primary | ICD-10-CM

## 2021-01-18 NOTE — TELEPHONE ENCOUNTER
Spoke w/the pt and she complains of pain 3/10 to surgical site. Pt requested norco for pain but would like to cancel request, she is taking tylenol 500 mg as directed and states it is helpful. Denies s/s of infection.  Pt keeping hand elevated and has slowl

## 2021-01-18 NOTE — PROGRESS NOTES
Carpal Tunnel Post - Op Note:    Subjective: Everything went well.       Objective:  Occupational Therapy completed the following educational areas status post elective carpal tunnel procedure:    1)  Dressing was removed and handwashing technique was revie

## 2021-01-27 ENCOUNTER — OFFICE VISIT (OUTPATIENT)
Dept: SURGERY | Facility: CLINIC | Age: 56
End: 2021-01-27
Payer: COMMERCIAL

## 2021-01-27 ENCOUNTER — TELEPHONE (OUTPATIENT)
Dept: GENETICS | Facility: HOSPITAL | Age: 56
End: 2021-01-27

## 2021-01-27 DIAGNOSIS — M62.81 DISTAL MUSCLE WEAKNESS: Primary | ICD-10-CM

## 2021-01-27 DIAGNOSIS — M25.641 JOINT STIFFNESS OF HAND, RIGHT: ICD-10-CM

## 2021-01-27 PROCEDURE — 97110 THERAPEUTIC EXERCISES: CPT | Performed by: OCCUPATIONAL THERAPIST

## 2021-01-27 PROCEDURE — 97166 OT EVAL MOD COMPLEX 45 MIN: CPT | Performed by: OCCUPATIONAL THERAPIST

## 2021-01-27 NOTE — PROGRESS NOTES
OCCUPATIONAL THERAPY EVALUATION:   Tierra Hernandez   [id number]       SUBJECTIVE:    HX of Injury: Right hand pain and numbness. Chief Complaint:   RMF numbness. Precautions: 2 # lifting restriction with the right upper extremity.   Premorbid Function for self-care, leisure and work related tasks:  . Patient will be seen 2 x /week for 2 weeks or a total of 4 visits. Pt. was advised regarding the findings of this evaluation and agrees to the plan of care.      Morenita Xavier I have reviewed the

## 2021-01-27 NOTE — TELEPHONE ENCOUNTER
Reported NEGATIVE genetic testing results over phone to Solange Callahan. She opted for 84 gene panel through [facility].) She should still be followed more closely given unknown etiology of cancers in family.  Released results to her through la

## 2021-01-29 ENCOUNTER — TELEPHONE (OUTPATIENT)
Dept: NEUROLOGY | Facility: CLINIC | Age: 56
End: 2021-01-29

## 2021-02-01 ENCOUNTER — TELEPHONE (OUTPATIENT)
Dept: SURGERY | Facility: CLINIC | Age: 56
End: 2021-02-01

## 2021-02-01 RX ORDER — PREGABALIN 75 MG/1
75 CAPSULE ORAL 2 TIMES DAILY
Qty: 60 CAPSULE | Refills: 0 | OUTPATIENT
Start: 2021-02-01

## 2021-02-01 RX ORDER — PREGABALIN 75 MG/1
CAPSULE ORAL
Qty: 60 CAPSULE | Refills: 0 | OUTPATIENT
Start: 2021-02-01

## 2021-02-01 NOTE — TELEPHONE ENCOUNTER
Spoke to pt about [facility]. Pt states she requests a prescription for Lyrica. Pt instructed to message her PCP about the medication because he is the ordering physician. Pt verbalized understanding.    Pt instructed to call with any other quest

## 2021-02-01 NOTE — TELEPHONE ENCOUNTER
Medication request: Lyrica 75mg Take 1 capsule (75 mg total) by mouth 2 (two) times daily.     LOV: 11/18/20   NOV: none    [place] 01/04/21 #60 r-0

## 2021-02-02 ENCOUNTER — TELEPHONE (OUTPATIENT)
Dept: NEUROLOGY | Facility: CLINIC | Age: 56
End: 2021-02-02

## 2021-02-02 ENCOUNTER — OFFICE VISIT (OUTPATIENT)
Dept: NEUROLOGY | Facility: CLINIC | Age: 56
End: 2021-02-02
Payer: COMMERCIAL

## 2021-02-02 VITALS
OXYGEN SATURATION: 97 % | DIASTOLIC BLOOD PRESSURE: 84 MMHG | HEART RATE: 64 BPM | WEIGHT: 210 LBS | SYSTOLIC BLOOD PRESSURE: 124 MMHG | HEIGHT: 66 IN | BODY MASS INDEX: 33.75 KG/M2

## 2021-02-02 DIAGNOSIS — G56.03 BILATERAL CARPAL TUNNEL SYNDROME: Primary | ICD-10-CM

## 2021-02-02 PROCEDURE — 99214 OFFICE O/P EST MOD 30 MIN: CPT | Performed by: PHYSICAL MEDICINE & REHABILITATION

## 2021-02-02 PROCEDURE — 3008F BODY MASS INDEX DOCD: CPT | Performed by: PHYSICAL MEDICINE & REHABILITATION

## 2021-02-02 PROCEDURE — 3079F DIAST BP 80-89 MM HG: CPT | Performed by: PHYSICAL MEDICINE & REHABILITATION

## 2021-02-02 PROCEDURE — 3074F SYST BP LT 130 MM HG: CPT | Performed by: PHYSICAL MEDICINE & REHABILITATION

## 2021-02-02 RX ORDER — PREGABALIN 150 MG/1
150 CAPSULE ORAL 2 TIMES DAILY
Qty: 60 CAPSULE | Refills: 0 | Status: SHIPPED | OUTPATIENT
Start: 2021-02-02 | End: 2021-03-09

## 2021-02-02 NOTE — PROGRESS NOTES
[address]  FOLLOW UP EVALUATION      Chief Complaint: Hand pain    HISTORY OF PRESENT ILLNESS:   Patient presents with:  Hand Pain: LOV 11/18/20 f/u for bilateral carpal tunnel.  Patient had right carpal tong weakness and has a difficult time holding onto objects. She is feeling \"electrical shocks\" in her hands. She has been doing home exercises, has had EMG testing with me as noted below as well.   She denies any changes in bowel bladder habits, any neck pa • WRIST ARTHROSCOP,RELEASE [facility] Eye LIG Right 01/15/2021    right endoscopic carpal tunnel release          CURRENT MEDICATIONS:     Current Outpatient Medications   Medication Sig Dispense Refill   • pregabalin (LYRICA) 150 MG Oral Cap Take 1 capsule (150 refill.    Lungs: Non-labored respirations  Abdomen: No abdominal guarding  Extremities: No lower extremity edema bilaterally   Skin: No lesions noted   Cognition: alert & oriented x 3, attentive, able to follow 2 step commands, comprehention intact, sponta medication overall.   I advised her to follow-up with her orthopedic surgeon with regards to her right hand pain and see if there is any further treatment although it has only been 2 weeks since her surgery and advised her that she will need to continue occ

## 2021-02-03 NOTE — TELEPHONE ENCOUNTER
Dr. Luis Angel Ruiz,         Please sign off on form:  -Highlight the patient and hit \"Chart\" button.   -In Chart Review, w/in the Encounter tab - click 1 time on the Telephone call encounter for 11/30/20  Scroll down the telephone encounter.  -Click \"scan on

## 2021-02-04 ENCOUNTER — OFFICE VISIT (OUTPATIENT)
Dept: SURGERY | Facility: CLINIC | Age: 56
End: 2021-02-04
Payer: COMMERCIAL

## 2021-02-04 DIAGNOSIS — G56.01 CARPAL TUNNEL SYNDROME ON RIGHT: Primary | ICD-10-CM

## 2021-02-04 DIAGNOSIS — M25.641 JOINT STIFFNESS OF HAND, RIGHT: ICD-10-CM

## 2021-02-04 DIAGNOSIS — M62.81 DISTAL MUSCLE WEAKNESS: Primary | ICD-10-CM

## 2021-02-04 PROCEDURE — 99024 POSTOP FOLLOW-UP VISIT: CPT | Performed by: PLASTIC SURGERY

## 2021-02-04 PROCEDURE — 97110 THERAPEUTIC EXERCISES: CPT | Performed by: OCCUPATIONAL THERAPIST

## 2021-02-04 NOTE — PROGRESS NOTES
Subjective: My right hand is still sore. Objective:     Current level of performance:  ADL: Independent  Work: 2 # lifting restriction.   Leisure: Family    Measurements/Tests:  ROM:  Testing By: april   Strength Right: 15 #      Strength Left: 4

## 2021-02-04 NOTE — PROGRESS NOTES
Surgery 1: RECTR  - Date: 01/15/21  - Days Since: 20     Intermittent \"sharp\" pain. Rates pain 5/10. Taking lyrica helpful. Numbness RMF  Scars healing well without s/s of infection, skin peeling present. Encouraged to continue Eucerin massage.       2

## 2021-02-10 ENCOUNTER — OFFICE VISIT (OUTPATIENT)
Dept: SURGERY | Facility: CLINIC | Age: 56
End: 2021-02-10
Payer: COMMERCIAL

## 2021-02-10 DIAGNOSIS — M62.81 DISTAL MUSCLE WEAKNESS: ICD-10-CM

## 2021-02-10 DIAGNOSIS — M25.641 JOINT STIFFNESS OF HAND, RIGHT: Primary | ICD-10-CM

## 2021-02-10 PROCEDURE — 97022 WHIRLPOOL THERAPY: CPT | Performed by: OCCUPATIONAL THERAPIST

## 2021-02-10 PROCEDURE — 97035 APP MDLTY 1+ULTRASOUND EA 15: CPT | Performed by: OCCUPATIONAL THERAPIST

## 2021-02-10 NOTE — PROGRESS NOTES
Subjective: I have difficulty making a fist.      Objective:     Current level of performance:  ADL: Independent  Work: On leave  Leisure: Family    Measurements/Tests:  ROM:            N/A      Treatment Provided this day: Fluidotherapy to the right wrist

## 2021-02-15 ENCOUNTER — OFFICE VISIT (OUTPATIENT)
Dept: SURGERY | Facility: CLINIC | Age: 56
End: 2021-02-15
Payer: COMMERCIAL

## 2021-02-15 DIAGNOSIS — M62.81 DISTAL MUSCLE WEAKNESS: ICD-10-CM

## 2021-02-15 DIAGNOSIS — M25.641 JOINT STIFFNESS OF HAND, RIGHT: Primary | ICD-10-CM

## 2021-02-15 PROCEDURE — 97022 WHIRLPOOL THERAPY: CPT | Performed by: OCCUPATIONAL THERAPIST

## 2021-02-15 PROCEDURE — 97035 APP MDLTY 1+ULTRASOUND EA 15: CPT | Performed by: OCCUPATIONAL THERAPIST

## 2021-02-15 NOTE — PROGRESS NOTES
Subjective: I am taking it easy.       Objective:     Current level of performance:  ADL: Independent  Work: On leave  Leisure: Family    Measurements/Tests:  ROM:            N/A      Treatment Provided this day: Fluidotherapy to the right wrist and hand: T

## 2021-02-17 ENCOUNTER — OFFICE VISIT (OUTPATIENT)
Dept: SURGERY | Facility: CLINIC | Age: 56
End: 2021-02-17
Payer: COMMERCIAL

## 2021-02-17 DIAGNOSIS — M25.641 JOINT STIFFNESS OF HAND, RIGHT: Primary | ICD-10-CM

## 2021-02-17 DIAGNOSIS — M62.81 DISTAL MUSCLE WEAKNESS: ICD-10-CM

## 2021-02-17 PROCEDURE — 97022 WHIRLPOOL THERAPY: CPT | Performed by: OCCUPATIONAL THERAPIST

## 2021-02-17 PROCEDURE — 97035 APP MDLTY 1+ULTRASOUND EA 15: CPT | Performed by: OCCUPATIONAL THERAPIST
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
## 2021-02-22 ENCOUNTER — OFFICE VISIT (OUTPATIENT)
Dept: SURGERY | Facility: CLINIC | Age: 56
End: 2021-02-22
Payer: COMMERCIAL

## 2021-02-22 DIAGNOSIS — M62.81 DISTAL MUSCLE WEAKNESS: ICD-10-CM

## 2021-02-22 DIAGNOSIS — M25.641 JOINT STIFFNESS OF HAND, RIGHT: Primary | ICD-10-CM

## 2021-02-22 PROCEDURE — 97022 WHIRLPOOL THERAPY: CPT | Performed by: OCCUPATIONAL THERAPIST

## 2021-02-22 PROCEDURE — 97035 APP MDLTY 1+ULTRASOUND EA 15: CPT | Performed by: OCCUPATIONAL THERAPIST

## 2021-02-22 NOTE — PROGRESS NOTES
Subjective: My right hand is still sore. Objective:     Current level of performance:  ADL: Independent  Work: On leave  Leisure: Family    Measurements/Tests:  ROM:         Not formally measured this day.          Treatment Provided this day: Fluidoth

## 2021-02-25 ENCOUNTER — OFFICE VISIT (OUTPATIENT)
Dept: SURGERY | Facility: CLINIC | Age: 56
End: 2021-02-25
Payer: COMMERCIAL

## 2021-02-25 ENCOUNTER — TELEPHONE (OUTPATIENT)
Dept: SURGERY | Facility: CLINIC | Age: 56
End: 2021-02-25

## 2021-02-25 DIAGNOSIS — M62.81 DISTAL MUSCLE WEAKNESS: ICD-10-CM

## 2021-02-25 DIAGNOSIS — G56.01 CARPAL TUNNEL SYNDROME ON RIGHT: Primary | ICD-10-CM

## 2021-02-25 DIAGNOSIS — M25.641 JOINT STIFFNESS OF HAND, RIGHT: Primary | ICD-10-CM

## 2021-02-25 PROCEDURE — 99024 POSTOP FOLLOW-UP VISIT: CPT | Performed by: PLASTIC SURGERY

## 2021-02-25 PROCEDURE — 97110 THERAPEUTIC EXERCISES: CPT | Performed by: OCCUPATIONAL THERAPIST

## 2021-02-25 NOTE — PROGRESS NOTES
Surgery 1: RECTR  - Date: 01/15/21  - Days Since: 41    Pt continues OT exercise plan and eucerin massage as instructed complains of intermittent pain 5/10 and stiffness    41 days postop  She still has numbness especially in the middle finger  She feels s

## 2021-02-25 NOTE — PROGRESS NOTES
Subjective: My middle finger is numb.       Objective:     Current level of performance:  ADL: Independent  Work: On leave  Leisure: Family    Measurements/Tests:  ROM:  Testing By: april   Strength Right: 10 #      Strength Left: 40 #      Treatment

## 2021-02-25 NOTE — TELEPHONE ENCOUNTER
Pt left [employer] Disability Form with Kayleigh Alcantara OT at her appt on 02/25/21. Pt had previously submitted forms for Dr Diamond Pascual to complete. Scanned to OTIS and brought original to OTIS @ [facility].

## 2021-02-26 NOTE — TELEPHONE ENCOUNTER
Dr. Vee Blum,       Please sign off on form:  -Highlight the patient and hit \"Chart\" button.   -In Chart Review, w/in the Encounter tab - click 1 time on the Telephone call encounter for 2/25/21 Scroll down the telephone encounter.  -Click \"scan on\" b

## 2021-03-04 ENCOUNTER — APPOINTMENT (OUTPATIENT)
Dept: SURGERY | Facility: CLINIC | Age: 56
End: 2021-03-04
Payer: COMMERCIAL

## 2021-03-04 DIAGNOSIS — M25.641 JOINT STIFFNESS OF HAND, RIGHT: Primary | ICD-10-CM

## 2021-03-04 DIAGNOSIS — M62.81 DISTAL MUSCLE WEAKNESS: ICD-10-CM

## 2021-03-04 PROCEDURE — 97022 WHIRLPOOL THERAPY: CPT | Performed by: OCCUPATIONAL THERAPIST

## 2021-03-04 PROCEDURE — 97110 THERAPEUTIC EXERCISES: CPT | Performed by: OCCUPATIONAL THERAPIST

## 2021-03-08 NOTE — PROGRESS NOTES
Subjective: I have not been doing much:      Objective:     Current level of performance:  ADL: Independent  Work: On leave  Leisure: Family    Measurements/Tests:  ROM:         N/A         Treatment Provided this day: Fluidotherapy to the right wrist and

## 2021-03-09 ENCOUNTER — OFFICE VISIT (OUTPATIENT)
Dept: SURGERY | Facility: CLINIC | Age: 56
End: 2021-03-09
Payer: COMMERCIAL

## 2021-03-09 DIAGNOSIS — M62.81 DISTAL MUSCLE WEAKNESS: ICD-10-CM

## 2021-03-09 DIAGNOSIS — M25.641 JOINT STIFFNESS OF HAND, RIGHT: Primary | ICD-10-CM

## 2021-03-09 PROCEDURE — 97018 PARAFFIN BATH THERAPY: CPT | Performed by: OCCUPATIONAL THERAPIST

## 2021-03-09 PROCEDURE — 97035 APP MDLTY 1+ULTRASOUND EA 15: CPT | Performed by: OCCUPATIONAL THERAPIST

## 2021-03-09 NOTE — PROGRESS NOTES
Subjective: My hand gave me problems this am.      Objective:     Current level of performance:  ADL: Independent  Work: On leave  Leisure: Family    Measurements/Tests:  ROM:         N/A         Treatment Provided this day: Paraffin bath to the right wris

## 2021-03-10 RX ORDER — PREGABALIN 150 MG/1
150 CAPSULE ORAL 2 TIMES DAILY
Qty: 60 CAPSULE | Refills: 0 | Status: SHIPPED | OUTPATIENT
Start: 2021-03-10 | End: 2021-04-30

## 2021-03-10 NOTE — TELEPHONE ENCOUNTER
Medication request: Pregabalin 150mg Take 1 capsule (150 mg total) by mouth 2 (two) times daily.      LOV: 02/02/21  NOV: none    [place]: 02/02/21 #60 r-0    Patient states she is doing okay with the pregabalin 150mg BID and she would like to sta

## 2021-03-16 ENCOUNTER — OFFICE VISIT (OUTPATIENT)
Dept: SURGERY | Facility: CLINIC | Age: 56
End: 2021-03-16
Payer: COMMERCIAL

## 2021-03-16 DIAGNOSIS — M62.81 DISTAL MUSCLE WEAKNESS: ICD-10-CM

## 2021-03-16 DIAGNOSIS — M25.641 JOINT STIFFNESS OF HAND, RIGHT: Primary | ICD-10-CM

## 2021-03-16 PROCEDURE — 99070 SPECIAL SUPPLIES PHYS/QHP: CPT | Performed by: OCCUPATIONAL THERAPIST

## 2021-03-16 NOTE — PROGRESS NOTES
Subjective: My right hand does not feel right:      Objective:     Current level of performance:  ADL: Independent, however painful:  Work: On leave  Leisure: Family, cooking:    Measurements/Tests:  ROM:         N/A         Treatment Provided this day: Pa

## 2021-03-22 ENCOUNTER — OFFICE VISIT (OUTPATIENT)
Dept: SURGERY | Facility: CLINIC | Age: 56
End: 2021-03-22
Payer: COMMERCIAL

## 2021-03-22 DIAGNOSIS — M62.81 DISTAL MUSCLE WEAKNESS: ICD-10-CM

## 2021-03-22 DIAGNOSIS — M25.641 JOINT STIFFNESS OF HAND, RIGHT: Primary | ICD-10-CM

## 2021-03-22 DIAGNOSIS — G56.03 BILATERAL CARPAL TUNNEL SYNDROME: Primary | ICD-10-CM

## 2021-03-22 PROCEDURE — 99024 POSTOP FOLLOW-UP VISIT: CPT | Performed by: PLASTIC SURGERY

## 2021-03-22 PROCEDURE — 97110 THERAPEUTIC EXERCISES: CPT | Performed by: OCCUPATIONAL THERAPIST

## 2021-03-22 NOTE — PROGRESS NOTES
Subjective: My left hand is very weak.       Objective:     Current level of performance:  ADL: Independent  Work: On leave  Leisure: Family    Measurements/Tests:  ROM:  Testing By: april   Strength Right: 15 #      Strength Left: 40 #      Treatment

## 2021-03-22 NOTE — PROGRESS NOTES
Surgery 1: RECTR  - Date: 01/15/21  - Days Since: 66  Constant numbness RMF  Intermittent \"throbbing\" of palm scar with activity and pressure. Rates pain 5/10. Taking tylenol prn,helpful.   Scars healing well without s/s of infection  Doing Eucerin mass

## 2021-03-24 ENCOUNTER — OFFICE VISIT (OUTPATIENT)
Dept: SURGERY | Facility: CLINIC | Age: 56
End: 2021-03-24
Payer: COMMERCIAL

## 2021-03-24 DIAGNOSIS — M25.641 JOINT STIFFNESS OF HAND, RIGHT: Primary | ICD-10-CM

## 2021-03-24 DIAGNOSIS — M62.81 DISTAL MUSCLE WEAKNESS: ICD-10-CM

## 2021-03-24 PROCEDURE — 97022 WHIRLPOOL THERAPY: CPT | Performed by: OCCUPATIONAL THERAPIST

## 2021-03-24 PROCEDURE — 97110 THERAPEUTIC EXERCISES: CPT | Performed by: OCCUPATIONAL THERAPIST

## 2021-03-24 NOTE — PROGRESS NOTES
Subjective: I am doing some spring cleaning.       Objective:     Current level of performance:  ADL: Independent  Work: On leave  Leisure: Family    Measurements/Tests:  ROM:         Not formally assessed:         Treatment Provided this day: Fluidotherapy

## 2021-04-01 ENCOUNTER — OFFICE VISIT (OUTPATIENT)
Dept: SURGERY | Facility: CLINIC | Age: 56
End: 2021-04-01
Payer: COMMERCIAL

## 2021-04-01 DIAGNOSIS — M62.81 DISTAL MUSCLE WEAKNESS: ICD-10-CM

## 2021-04-01 DIAGNOSIS — M25.641 JOINT STIFFNESS OF HAND, RIGHT: Primary | ICD-10-CM

## 2021-04-01 PROCEDURE — 97035 APP MDLTY 1+ULTRASOUND EA 15: CPT | Performed by: OCCUPATIONAL THERAPIST

## 2021-04-01 PROCEDURE — 97022 WHIRLPOOL THERAPY: CPT | Performed by: OCCUPATIONAL THERAPIST

## 2021-04-01 NOTE — PROGRESS NOTES
Subjective: I have been having pain in my Right hand when I use it. Objective:     Current level of performance:  ADL: Independent with self care task needs:  Work: On Leave   Leisure: Family, Cooking. .    Measurements/Tests:  ROM:         Full right

## 2021-04-06 ENCOUNTER — OFFICE VISIT (OUTPATIENT)
Dept: SURGERY | Facility: CLINIC | Age: 56
End: 2021-04-06
Payer: COMMERCIAL

## 2021-04-06 DIAGNOSIS — M25.641 JOINT STIFFNESS OF HAND, RIGHT: Primary | ICD-10-CM

## 2021-04-06 DIAGNOSIS — M62.81 DISTAL MUSCLE WEAKNESS: ICD-10-CM

## 2021-04-06 PROCEDURE — 97035 APP MDLTY 1+ULTRASOUND EA 15: CPT | Performed by: OCCUPATIONAL THERAPIST

## 2021-04-06 PROCEDURE — 97022 WHIRLPOOL THERAPY: CPT | Performed by: OCCUPATIONAL THERAPIST

## 2021-04-06 NOTE — PROGRESS NOTES
Subjective: I use my right hand a lot.       Objective:     Current level of performance:  ADL: Independent  Work: On leave  Leisure: Family    Measurements/Tests:  ROM:            N/A      Treatment Provided this day: Fluidotherapy to the right wrist and h

## 2021-04-13 ENCOUNTER — OFFICE VISIT (OUTPATIENT)
Dept: SURGERY | Facility: CLINIC | Age: 56
End: 2021-04-13
Payer: COMMERCIAL

## 2021-04-13 DIAGNOSIS — M25.641 JOINT STIFFNESS OF HAND, RIGHT: Primary | ICD-10-CM

## 2021-04-13 DIAGNOSIS — M62.81 DISTAL MUSCLE WEAKNESS: ICD-10-CM

## 2021-04-13 PROCEDURE — 97022 WHIRLPOOL THERAPY: CPT | Performed by: OCCUPATIONAL THERAPIST

## 2021-04-13 PROCEDURE — 97035 APP MDLTY 1+ULTRASOUND EA 15: CPT | Performed by: OCCUPATIONAL THERAPIST

## 2021-04-13 NOTE — PROGRESS NOTES
Subjective: I am not ready to return to work yet:      Objective:     Current level of performance:  ADL: Independent  Work: On leave  Leisure: Not addressed:    Measurements/Tests:  ROM:         N/A:         Treatment Provided this day: Fluidotherapy to t

## 2021-04-14 ENCOUNTER — TELEPHONE (OUTPATIENT)
Dept: NEUROLOGY | Facility: CLINIC | Age: 56
End: 2021-04-14

## 2021-04-14 NOTE — TELEPHONE ENCOUNTER
Recv'd  Short-term disability paperwork - asking for medical records after 3/23/21 patient's LOV: 2/2/21.    S/w Alecia she's requesting a rejection letter stating we will not be sending any medical records at this time due to her not having any records bein

## 2021-04-14 NOTE — TELEPHONE ENCOUNTER
disab forms received in forms dept. Logged for processing. Logged [facility] hipaa as well.

## 2021-04-19 ENCOUNTER — OFFICE VISIT (OUTPATIENT)
Dept: SURGERY | Facility: CLINIC | Age: 56
End: 2021-04-19
Payer: COMMERCIAL

## 2021-04-19 DIAGNOSIS — M25.641 JOINT STIFFNESS OF HAND, RIGHT: Primary | ICD-10-CM

## 2021-04-19 DIAGNOSIS — M62.81 DISTAL MUSCLE WEAKNESS: ICD-10-CM

## 2021-04-19 DIAGNOSIS — G56.03 BILATERAL CARPAL TUNNEL SYNDROME: Primary | ICD-10-CM

## 2021-04-19 PROCEDURE — 97110 THERAPEUTIC EXERCISES: CPT | Performed by: OCCUPATIONAL THERAPIST

## 2021-04-19 PROCEDURE — 99213 OFFICE O/P EST LOW 20 MIN: CPT | Performed by: PLASTIC SURGERY

## 2021-04-19 NOTE — PROGRESS NOTES
Surgery 1: RECTR  - Date: 01/15/21  - Days Since: 94  Intermittent throbbing RTH CMC with activity. Rates pain 5/10. Not taking analgesics. Constant RMF numbness  Scars healing well without s/s of infection  Doing Eucerin massage many times a day.     94

## 2021-05-03 RX ORDER — PREGABALIN 150 MG/1
150 CAPSULE ORAL 2 TIMES DAILY
Qty: 60 CAPSULE | Refills: 0 | Status: SHIPPED | OUTPATIENT
Start: 2021-05-03 | End: 2021-06-17

## 2021-05-03 NOTE — TELEPHONE ENCOUNTER
Medication request: Pregabalin 150mg Take 1 capsule (150 mg total) by mouth 2 (two) times daily.     LOV: 02/02/21  NOV: none    [place] 03/10/21 #60 r-0

## 2021-05-04 NOTE — PROGRESS NOTES
I spoke with the patient she is planning to get  her blood work done next week. Subjective: I have numbness in my RMF:      Objective:     Current level of performance:  ADL: Independent  Work: Returning to full duty 04/20/21  Leisure: Not addressed    Measurements/Tests:  ROM:  Testing By: april   Strength Right: 50 #      Stre

## 2021-06-05 ENCOUNTER — OFFICE VISIT (OUTPATIENT)
Dept: FAMILY MEDICINE CLINIC | Facility: CLINIC | Age: 56
End: 2021-06-05
Payer: COMMERCIAL

## 2021-06-05 VITALS
HEIGHT: 66 IN | WEIGHT: 230 LBS | HEART RATE: 61 BPM | DIASTOLIC BLOOD PRESSURE: 82 MMHG | SYSTOLIC BLOOD PRESSURE: 126 MMHG | BODY MASS INDEX: 36.96 KG/M2

## 2021-06-05 DIAGNOSIS — R21 RASH AND NONSPECIFIC SKIN ERUPTION: ICD-10-CM

## 2021-06-05 DIAGNOSIS — M72.2 PLANTAR FASCIITIS, BILATERAL: ICD-10-CM

## 2021-06-05 DIAGNOSIS — R06.02 SHORTNESS OF BREATH ON EXERTION: ICD-10-CM

## 2021-06-05 DIAGNOSIS — Z82.49 FAMILY HISTORY OF PREMATURE CAD: ICD-10-CM

## 2021-06-05 DIAGNOSIS — M79.89 LEG SWELLING: Primary | ICD-10-CM

## 2021-06-05 PROCEDURE — 3008F BODY MASS INDEX DOCD: CPT | Performed by: FAMILY MEDICINE

## 2021-06-05 PROCEDURE — 99214 OFFICE O/P EST MOD 30 MIN: CPT | Performed by: FAMILY MEDICINE

## 2021-06-05 PROCEDURE — 3074F SYST BP LT 130 MM HG: CPT | Performed by: FAMILY MEDICINE

## 2021-06-05 PROCEDURE — 3079F DIAST BP 80-89 MM HG: CPT | Performed by: FAMILY MEDICINE

## 2021-06-05 RX ORDER — CLOTRIMAZOLE AND BETAMETHASONE DIPROPIONATE 10; .64 MG/G; MG/G
1 CREAM TOPICAL 2 TIMES DAILY PRN
Qty: 60 G | Refills: 0 | Status: SHIPPED | OUTPATIENT
Start: 2021-06-05 | End: 2021-06-19

## 2021-06-05 RX ORDER — HYDROCHLOROTHIAZIDE 12.5 MG/1
12.5 TABLET ORAL DAILY
Qty: 90 TABLET | Refills: 3 | Status: SHIPPED | OUTPATIENT
Start: 2021-06-05 | End: 2021-08-26

## 2021-06-05 NOTE — PROGRESS NOTES
HPI:    Patient ID: Nishant Johnson is a 50year old female. HPI  Patient presents with:  Swelling: bilateral. feet, ankles, legs. bruise on left heel.  painful. started 2 weeks ago     Patient here with complains of having swelling in her feet and ank daily. 60 capsule 0     Allergies:No Known Allergies   PHYSICAL EXAM:   Patient presents with:  Swelling: bilateral. feet, ankles, legs. bruise on left heel.  painful. started 2 weeks ago      Physical Exam  Constitutional:       Appearance: She is well-dev history of premature CAD  Recommend cardiac stress test if EKG normal    4. Rash and nonspecific skin eruption  ? Fungal   Follow up 2 weeks  - clotrimazole-betamethasone 1-0.05 % External Cream; Apply 1 Application topically 2 (two) times daily as needed.

## 2021-06-11 ENCOUNTER — HOSPITAL ENCOUNTER (OUTPATIENT)
Dept: GENERAL RADIOLOGY | Facility: HOSPITAL | Age: 56
Discharge: HOME | End: 2021-06-11
Attending: FAMILY MEDICINE
Payer: COMMERCIAL

## 2021-06-11 DIAGNOSIS — R06.02 SHORTNESS OF BREATH ON EXERTION: ICD-10-CM

## 2021-06-11 PROCEDURE — 71046 X-RAY EXAM CHEST 2 VIEWS: CPT | Performed by: FAMILY MEDICINE

## 2021-06-15 DIAGNOSIS — Z82.49 FAMILY HISTORY OF PREMATURE CAD: Primary | ICD-10-CM

## 2021-06-15 DIAGNOSIS — I51.7 CARDIOMEGALY: ICD-10-CM

## 2021-06-16 RX ORDER — PREGABALIN 150 MG/1
150 CAPSULE ORAL 2 TIMES DAILY
Qty: 60 CAPSULE | Refills: 0 | Status: CANCELLED | OUTPATIENT
Start: 2021-06-16

## 2021-06-16 RX ORDER — PREGABALIN 150 MG/1
150 CAPSULE ORAL 2 TIMES DAILY
Qty: 60 CAPSULE | Refills: 0 | OUTPATIENT
Start: 2021-06-16

## 2021-06-16 NOTE — TELEPHONE ENCOUNTER
Medication request: Pregabalin 150mg Take 1 capsule (150 mg total) by mouth 2 (two) times daily.     LOV: 02/02/21  NOV: none    [place] 05/03/21 #60 r-0

## 2021-06-17 DIAGNOSIS — R21 RASH AND NONSPECIFIC SKIN ERUPTION: ICD-10-CM

## 2021-06-17 RX ORDER — PREGABALIN 150 MG/1
CAPSULE ORAL
Qty: 60 CAPSULE | Refills: 0 | OUTPATIENT
Start: 2021-06-17

## 2021-06-17 RX ORDER — PREGABALIN 150 MG/1
150 CAPSULE ORAL 2 TIMES DAILY
Qty: 60 CAPSULE | Refills: 0 | Status: SHIPPED | OUTPATIENT
Start: 2021-06-17 | End: 2021-06-28

## 2021-06-17 NOTE — TELEPHONE ENCOUNTER
I have sent the medication to her pharmacy.     Kathy Guzmanal DO, FAAPMR & CAQSM  Physical Medicine and Rehabilitation/Sports Medicine  MEDICAL CENTER [facility]

## 2021-06-19 RX ORDER — CLOTRIMAZOLE AND BETAMETHASONE DIPROPIONATE 10; .64 MG/G; MG/G
1 CREAM TOPICAL 2 TIMES DAILY PRN
Qty: 60 G | Refills: 0 | Status: SHIPPED | OUTPATIENT
Start: 2021-06-19 | End: 2021-08-26

## 2021-06-21 NOTE — TELEPHONE ENCOUNTER
The patient was called and informed. She stated the foot is not getting any better. I transferred the call to dermatology to schedule an appointment.

## 2021-06-28 ENCOUNTER — HOSPITAL ENCOUNTER (OUTPATIENT)
Dept: GENERAL RADIOLOGY | Facility: HOSPITAL | Age: 56
Discharge: HOME | End: 2021-06-28
Attending: PHYSICAL MEDICINE & REHABILITATION
Payer: COMMERCIAL

## 2021-06-28 ENCOUNTER — OFFICE VISIT (OUTPATIENT)
Dept: NEUROLOGY | Facility: CLINIC | Age: 56
End: 2021-06-28
Payer: COMMERCIAL

## 2021-06-28 VITALS
SYSTOLIC BLOOD PRESSURE: 140 MMHG | HEIGHT: 66 IN | HEART RATE: 66 BPM | DIASTOLIC BLOOD PRESSURE: 90 MMHG | WEIGHT: 203 LBS | OXYGEN SATURATION: 97 % | BODY MASS INDEX: 32.62 KG/M2

## 2021-06-28 DIAGNOSIS — G56.03 BILATERAL CARPAL TUNNEL SYNDROME: ICD-10-CM

## 2021-06-28 DIAGNOSIS — M72.2 PLANTAR FASCIITIS, BILATERAL: ICD-10-CM

## 2021-06-28 DIAGNOSIS — M72.2 PLANTAR FASCIITIS, BILATERAL: Primary | ICD-10-CM

## 2021-06-28 PROCEDURE — 3077F SYST BP >= 140 MM HG: CPT | Performed by: PHYSICAL MEDICINE & REHABILITATION

## 2021-06-28 PROCEDURE — 73610 X-RAY EXAM OF ANKLE: CPT | Performed by: PHYSICAL MEDICINE & REHABILITATION

## 2021-06-28 PROCEDURE — 3080F DIAST BP >= 90 MM HG: CPT | Performed by: PHYSICAL MEDICINE & REHABILITATION

## 2021-06-28 PROCEDURE — 99214 OFFICE O/P EST MOD 30 MIN: CPT | Performed by: PHYSICAL MEDICINE & REHABILITATION

## 2021-06-28 PROCEDURE — 3008F BODY MASS INDEX DOCD: CPT | Performed by: PHYSICAL MEDICINE & REHABILITATION

## 2021-06-28 RX ORDER — PREGABALIN 150 MG/1
150 CAPSULE ORAL 2 TIMES DAILY
Qty: 180 CAPSULE | Refills: 1 | Status: SHIPPED | OUTPATIENT
Start: 2021-06-28

## 2021-06-28 NOTE — PATIENT INSTRUCTIONS
-Continue Lyrica and follow up in 6 months  -Continue voltaren cream and hand exercises  -Start PT and home exercises for the feet  -Use forzen water bottle to roll the arches  -Can get heel cups for work shoes and night splints  -Follow up in 4 weeks

## 2021-06-28 NOTE — PROGRESS NOTES
[address]  FOLLOW UP EVALUATION      Chief Complaint: Hand pain    HISTORY OF PRESENT ILLNESS:   Patient presents with:  Hand Pain: pt here for f/u bilateral hand pain.   LOV 2/02/21   pt states was told nee habits. She continues occupational therapy as well and has a follow-up with orthopedic surgery scheduled. 11/18/20  Patient is here for follow-up evaluation of bilateral hand numbness and tingling and pain.   She recently had ultrasound-guided bilateral radiating the thumb as well. She has not had any imaging, therapy or other treatment for this problem. She has not had any EMG testing either. She denies any loss of bowel bladder control.   Patient works in [employer] and has to use her hands to Heartbeat: denies   Gastrointestinal  Bowel Incontinence: denies  Heartburn: denies   Genitourinary  Difficulty Urinating: denies  Bladder Incontinence: denies   Musculoskeletal  Joint Stiffness: admits  Painful Joints: admits   Neurological  Loss of Stren HCT 42.3 05/26/2020    MCV 89.1 05/26/2020    MCH 30.9 05/26/2020    MCHC 34.8 05/26/2020    RDW 12.8 05/26/2020    .0 05/26/2020    MPV 6.9 (L) 11/26/2018     Lab Results   Component Value Date    GLU 82 05/26/2020    BUN 8 05/26/2020    BUNCREA 10

## 2021-06-30 ENCOUNTER — TELEPHONE (OUTPATIENT)
Dept: NEUROLOGY | Facility: CLINIC | Age: 56
End: 2021-06-30

## 2021-07-06 NOTE — Clinical Note
4 weeks video Lt ring finger nail bed lanced at bedside per  Dr Jessica Gallardo, cultures obtained and sent

## 2021-07-23 ENCOUNTER — TELEPHONE (OUTPATIENT)
Dept: CARDIOLOGY CLINIC | Facility: CLINIC | Age: 56
End: 2021-07-23

## 2021-07-23 NOTE — TELEPHONE ENCOUNTER
Called Duc Johnson, left message to call us regarding consult with Angel Chapman, we have an opening today at 4:20pm, we could reschedule her from 7/30 to 7/23

## 2021-08-26 ENCOUNTER — OFFICE VISIT (OUTPATIENT)
Dept: SURGERY | Facility: CLINIC | Age: 56
End: 2021-08-26
Payer: COMMERCIAL

## 2021-08-26 DIAGNOSIS — G56.02 CARPAL TUNNEL SYNDROME ON LEFT: ICD-10-CM

## 2021-08-26 DIAGNOSIS — G56.03 BILATERAL CARPAL TUNNEL SYNDROME: Primary | ICD-10-CM

## 2021-08-26 PROCEDURE — 99215 OFFICE O/P EST HI 40 MIN: CPT | Performed by: PLASTIC SURGERY

## 2021-08-26 RX ORDER — HYDROCODONE BITARTRATE AND ACETAMINOPHEN 7.5; 325 MG/1; MG/1
1 TABLET ORAL
Qty: 10 TABLET | Refills: 0 | Status: SHIPPED | OUTPATIENT
Start: 2021-08-26 | End: 2022-01-26 | Stop reason: ALTCHOICE

## 2021-08-26 NOTE — PROGRESS NOTES
Patient request for surgery signed by patient and witnessed and signed by RN. Prescription for Norco and narcotic prescription electronically sent to pharmacy per Dr. Rodas Double order and patient instructed to  prescription before surgery.    Pre-

## 2021-08-26 NOTE — H&P (VIEW-ONLY)
NEEDS LECTR      Pacemaker: No   Latex Allergy: no   Coumadin: No   Plavix: No   Other anticoagulants: No   Cardiac stents: No     HAND DOMINANCE: Right   Profession:      RECONSTRUCTIVE HISTORY     SUN EXPOSURE   Current no   Past no   S endoscopic carpal tunnel release         ALLERIGIES  No Known Allergies     MEDICATIONS  Current Outpatient Medications   Medication Sig Dispense Refill   • HYDROcodone-acetaminophen 7.5-325 MG Oral Tab Take 1 tablet by mouth every 4 to 6 hours as needed f Severe electrodiagnostic changes    DISPOSITION    We had a long discussion. I explained to the patient what carpal tunnel syndrome is including treatment options. The patient  may not have relief of symptoms with treatment.     This is a severe carpal tu +++++++++++++++++++++++++++++++++++++++++      MEDICAL DECISION MAKING    • PROBLEMS      HIGH    (number / complexity)       o    Illness with severe exacerbation and threat to bodily function    • DATA         STRAIGHTFORWARD    (amount / complexit

## 2021-08-26 NOTE — PROGRESS NOTES
Surgery 1: RECTR  - Date: 01/15/21  - Days Since: 223  Follow up for L CTS, pre-op. Numbness and pain have increased in [address] since initial visit 11/23/20  Intermittent LTH,LIF,LMF and LRF numbness  Intermittent LTH,LMF,LRF and L volar forearm sharp pain.   R

## 2021-09-14 ENCOUNTER — LAB ENCOUNTER (OUTPATIENT)
Dept: LAB | Age: 56
End: 2021-09-14
Attending: PLASTIC SURGERY
Payer: COMMERCIAL

## 2021-09-14 DIAGNOSIS — Z01.818 PRE-OP TESTING: ICD-10-CM

## 2021-09-16 LAB — SARS-COV-2 BY PCR (AUGUSTUS LAB): NOT DETECTED

## 2021-09-17 ENCOUNTER — ANESTHESIA EVENT (OUTPATIENT)
Dept: SURGERY | Facility: HOSPITAL | Age: 56
End: 2021-09-17
Payer: COMMERCIAL

## 2021-09-17 ENCOUNTER — HOSPITAL DOCUMENTATION (OUTPATIENT)
Dept: SURGERY | Facility: CLINIC | Age: 56
End: 2021-09-17

## 2021-09-17 ENCOUNTER — HOSPITAL ENCOUNTER (OUTPATIENT)
Facility: HOSPITAL | Age: 56
Setting detail: HOSPITAL OUTPATIENT SURGERY
Discharge: HOME | End: 2021-09-17
Attending: PLASTIC SURGERY | Admitting: PLASTIC SURGERY
Payer: COMMERCIAL

## 2021-09-17 ENCOUNTER — ANESTHESIA (OUTPATIENT)
Dept: SURGERY | Facility: HOSPITAL | Age: 56
End: 2021-09-17
Payer: COMMERCIAL

## 2021-09-17 VITALS
OXYGEN SATURATION: 98 % | HEIGHT: 66 IN | WEIGHT: 226 LBS | BODY MASS INDEX: 36.32 KG/M2 | DIASTOLIC BLOOD PRESSURE: 92 MMHG | HEART RATE: 54 BPM | RESPIRATION RATE: 18 BRPM | TEMPERATURE: 98 F | SYSTOLIC BLOOD PRESSURE: 139 MMHG

## 2021-09-17 DIAGNOSIS — G56.03 BILATERAL CARPAL TUNNEL SYNDROME: Primary | ICD-10-CM

## 2021-09-17 DIAGNOSIS — Z01.818 PRE-OP TESTING: Primary | ICD-10-CM

## 2021-09-17 DIAGNOSIS — G56.02 CARPAL TUNNEL SYNDROME ON LEFT: ICD-10-CM

## 2021-09-17 DIAGNOSIS — G56.02 LEFT CARPAL TUNNEL SYNDROME: ICD-10-CM

## 2021-09-17 PROCEDURE — 01N54ZZ RELEASE MEDIAN NERVE, PERCUTANEOUS ENDOSCOPIC APPROACH: ICD-10-PCS | Performed by: PLASTIC SURGERY

## 2021-09-17 PROCEDURE — 29848 WRIST ENDOSCOPY/SURGERY: CPT | Performed by: PLASTIC SURGERY

## 2021-09-17 RX ORDER — MORPHINE SULFATE 10 MG/ML
6 INJECTION, SOLUTION INTRAMUSCULAR; INTRAVENOUS EVERY 10 MIN PRN
Status: DISCONTINUED | OUTPATIENT
Start: 2021-09-17 | End: 2021-09-17

## 2021-09-17 RX ORDER — ONDANSETRON 2 MG/ML
4 INJECTION INTRAMUSCULAR; INTRAVENOUS ONCE AS NEEDED
Status: DISCONTINUED | OUTPATIENT
Start: 2021-09-17 | End: 2021-09-17

## 2021-09-17 RX ORDER — PROCHLORPERAZINE EDISYLATE 5 MG/ML
5 INJECTION INTRAMUSCULAR; INTRAVENOUS ONCE AS NEEDED
Status: DISCONTINUED | OUTPATIENT
Start: 2021-09-17 | End: 2021-09-17

## 2021-09-17 RX ORDER — MORPHINE SULFATE 4 MG/ML
4 INJECTION, SOLUTION INTRAMUSCULAR; INTRAVENOUS EVERY 10 MIN PRN
Status: DISCONTINUED | OUTPATIENT
Start: 2021-09-17 | End: 2021-09-17

## 2021-09-17 RX ORDER — SODIUM CHLORIDE, SODIUM LACTATE, POTASSIUM CHLORIDE, CALCIUM CHLORIDE 600; 310; 30; 20 MG/100ML; MG/100ML; MG/100ML; MG/100ML
INJECTION, SOLUTION INTRAVENOUS CONTINUOUS
Status: DISCONTINUED | OUTPATIENT
Start: 2021-09-17 | End: 2021-09-17

## 2021-09-17 RX ORDER — MORPHINE SULFATE 4 MG/ML
2 INJECTION, SOLUTION INTRAMUSCULAR; INTRAVENOUS EVERY 10 MIN PRN
Status: DISCONTINUED | OUTPATIENT
Start: 2021-09-17 | End: 2021-09-17

## 2021-09-17 RX ORDER — HALOPERIDOL 5 MG/ML
0.25 INJECTION INTRAMUSCULAR ONCE AS NEEDED
Status: DISCONTINUED | OUTPATIENT
Start: 2021-09-17 | End: 2021-09-17

## 2021-09-17 RX ORDER — HYDROCODONE BITARTRATE AND ACETAMINOPHEN 5; 325 MG/1; MG/1
1 TABLET ORAL AS NEEDED
Status: COMPLETED | OUTPATIENT
Start: 2021-09-17 | End: 2021-09-17

## 2021-09-17 RX ORDER — DEXAMETHASONE SODIUM PHOSPHATE 4 MG/ML
VIAL (ML) INJECTION AS NEEDED
Status: DISCONTINUED | OUTPATIENT
Start: 2021-09-17 | End: 2021-09-17 | Stop reason: SURG

## 2021-09-17 RX ORDER — ACETAMINOPHEN 500 MG
1000 TABLET ORAL ONCE
Status: COMPLETED | OUTPATIENT
Start: 2021-09-17 | End: 2021-09-17

## 2021-09-17 RX ORDER — HYDROMORPHONE HYDROCHLORIDE 1 MG/ML
0.6 INJECTION, SOLUTION INTRAMUSCULAR; INTRAVENOUS; SUBCUTANEOUS EVERY 5 MIN PRN
Status: DISCONTINUED | OUTPATIENT
Start: 2021-09-17 | End: 2021-09-17

## 2021-09-17 RX ORDER — KETOROLAC TROMETHAMINE 30 MG/ML
INJECTION, SOLUTION INTRAMUSCULAR; INTRAVENOUS AS NEEDED
Status: DISCONTINUED | OUTPATIENT
Start: 2021-09-17 | End: 2021-09-17 | Stop reason: SURG

## 2021-09-17 RX ORDER — HYDROMORPHONE HYDROCHLORIDE 1 MG/ML
0.2 INJECTION, SOLUTION INTRAMUSCULAR; INTRAVENOUS; SUBCUTANEOUS EVERY 5 MIN PRN
Status: DISCONTINUED | OUTPATIENT
Start: 2021-09-17 | End: 2021-09-17

## 2021-09-17 RX ORDER — METOCLOPRAMIDE 10 MG/1
10 TABLET ORAL ONCE
Status: COMPLETED | OUTPATIENT
Start: 2021-09-17 | End: 2021-09-17

## 2021-09-17 RX ORDER — HYDROCODONE BITARTRATE AND ACETAMINOPHEN 7.5; 325 MG/1; MG/1
1 TABLET ORAL EVERY 4 HOURS PRN
Status: DISCONTINUED | OUTPATIENT
Start: 2021-09-17 | End: 2021-09-17

## 2021-09-17 RX ORDER — FAMOTIDINE 20 MG/1
20 TABLET ORAL ONCE
Status: COMPLETED | OUTPATIENT
Start: 2021-09-17 | End: 2021-09-17

## 2021-09-17 RX ORDER — LIDOCAINE HYDROCHLORIDE 10 MG/ML
INJECTION, SOLUTION EPIDURAL; INFILTRATION; INTRACAUDAL; PERINEURAL AS NEEDED
Status: DISCONTINUED | OUTPATIENT
Start: 2021-09-17 | End: 2021-09-17 | Stop reason: SURG

## 2021-09-17 RX ORDER — HYDROCODONE BITARTRATE AND ACETAMINOPHEN 5; 325 MG/1; MG/1
2 TABLET ORAL AS NEEDED
Status: COMPLETED | OUTPATIENT
Start: 2021-09-17 | End: 2021-09-17

## 2021-09-17 RX ORDER — GLYCOPYRROLATE 0.2 MG/ML
INJECTION, SOLUTION INTRAMUSCULAR; INTRAVENOUS AS NEEDED
Status: DISCONTINUED | OUTPATIENT
Start: 2021-09-17 | End: 2021-09-17 | Stop reason: SURG

## 2021-09-17 RX ORDER — NALOXONE HYDROCHLORIDE 0.4 MG/ML
80 INJECTION, SOLUTION INTRAMUSCULAR; INTRAVENOUS; SUBCUTANEOUS AS NEEDED
Status: DISCONTINUED | OUTPATIENT
Start: 2021-09-17 | End: 2021-09-17

## 2021-09-17 RX ORDER — ONDANSETRON 2 MG/ML
INJECTION INTRAMUSCULAR; INTRAVENOUS AS NEEDED
Status: DISCONTINUED | OUTPATIENT
Start: 2021-09-17 | End: 2021-09-17 | Stop reason: SURG

## 2021-09-17 RX ORDER — HYDROMORPHONE HYDROCHLORIDE 1 MG/ML
0.4 INJECTION, SOLUTION INTRAMUSCULAR; INTRAVENOUS; SUBCUTANEOUS EVERY 5 MIN PRN
Status: DISCONTINUED | OUTPATIENT
Start: 2021-09-17 | End: 2021-09-17

## 2021-09-17 RX ADMIN — GLYCOPYRROLATE 0.2 MG: 0.2 INJECTION, SOLUTION INTRAMUSCULAR; INTRAVENOUS at 09:40:00

## 2021-09-17 RX ADMIN — ONDANSETRON 4 MG: 2 INJECTION INTRAMUSCULAR; INTRAVENOUS at 09:50:00

## 2021-09-17 RX ADMIN — LIDOCAINE HYDROCHLORIDE 50 MG: 10 INJECTION, SOLUTION EPIDURAL; INFILTRATION; INTRACAUDAL; PERINEURAL at 10:05:00

## 2021-09-17 RX ADMIN — SODIUM CHLORIDE, SODIUM LACTATE, POTASSIUM CHLORIDE, CALCIUM CHLORIDE: 600; 310; 30; 20 INJECTION, SOLUTION INTRAVENOUS at 09:37:00

## 2021-09-17 RX ADMIN — DEXAMETHASONE SODIUM PHOSPHATE 4 MG: 4 MG/ML VIAL (ML) INJECTION at 09:50:00

## 2021-09-17 RX ADMIN — KETOROLAC TROMETHAMINE 30 MG: 30 INJECTION, SOLUTION INTRAMUSCULAR; INTRAVENOUS at 10:29:00

## 2021-09-17 RX ADMIN — LIDOCAINE HYDROCHLORIDE 50 MG: 10 INJECTION, SOLUTION EPIDURAL; INFILTRATION; INTRACAUDAL; PERINEURAL at 09:44:00

## 2021-09-17 NOTE — ANESTHESIA PREPROCEDURE EVALUATION
Anesthesia PreOp Note    HPI:     Alison Alvarez is a 50year old female who presents for preoperative consultation requested by: Josie Magana MD    Date of Surgery: 9/17/2021    Procedure(s):  LEFT ENDOSCOPIC CARPAL TUNNEL RELEASE  Indication time      lactated ringers infusion, , Intravenous, Continuous, Severiano Kaur MD, Last Rate: 20 mL/hr at 09/17/21 0829, New Bag at 09/17/21 0829    No current [facility]-ordered outpatient medications on file.       No Known Allergies    Family History Transportation (Non-Medical):  Not on file  Physical Activity:       Days of Exercise per Week: Not on file      Minutes of Exercise per Session: Not on file  Stress:       Feeling of Stress : Not on file  Social Connections:       Frequency of Communicatio 2  Plan:   General  Informed Consent Plan and Risks Discussed With:  Patient      I have informed Marcos Cardenas and/or legal guardian or family member of the nature of the anesthetic plan, benefits, risks including possible dental damage if relevant, m

## 2021-09-17 NOTE — INTERVAL H&P NOTE
Pre-op Diagnosis: Carpal tunnel syndrome on left [G56.02]    The above referenced H&P was reviewed by eNgrito Puente MD on 9/17/2021, the patient was examined and no significant changes have occurred in the patient's condition since the H&P was per

## 2021-09-17 NOTE — BRIEF OP NOTE
Pre-Operative Diagnosis: Carpal tunnel syndrome on left [G56.02]     Post-Operative Diagnosis: Carpal tunnel syndrome on left [G56.02]      Procedure Performed:   LEFT ENDOSCOPIC CARPAL TUNNEL RELEASE    Surgeon(s) and Role:     * Shireen Reyes MD

## 2021-09-17 NOTE — ANESTHESIA PROCEDURE NOTES
Airway  Date/Time: 9/17/2021 9:45 AM  Urgency: Elective      General Information and Staff    Patient location during procedure: OR  Resident/CRNA: Adeel Anaya CRNA  Performed: CRNA     Indications and Patient Condition  Indications for airway manageme

## 2021-09-17 NOTE — ANESTHESIA POSTPROCEDURE EVALUATION
Patient: Renata Crum    Procedure Summary     Date: 09/17/21 Room / Location: [address] [place] [address] MAIN OR 01 / [facility] MAIN OR    Anesthesia Start: 1729 Anesthesia Stop: 4345    Procedure: LEFT ENDOSCOPIC CARPAL TUNNEL RELEASE (Left Hand) Diagnosis:       Carpal tunnel

## 2021-09-18 ENCOUNTER — TELEPHONE (OUTPATIENT)
Dept: SURGERY | Facility: CLINIC | Age: 56
End: 2021-09-18

## 2021-09-18 NOTE — OPERATIVE REPORT
[facility]    PATIENT'S NAME: Efren Lind   ATTENDING PHYSICIAN: Huy Crowley MD   OPERATING PHYSICIAN: Huy Crowley MD   PATIENT ACCOUNT#:   [id number]    LOCATION:  [place] [address]  MEDICAL RECORD #:   [id number] transverse carpal ligament. The slotted cannula and endoscope were placed. We had excellent visualization of the dorsal surface of the transverse carpal ligament.   Complete release of the transverse carpal ligament was accomplished with a push knife, a t

## 2021-09-18 NOTE — TELEPHONE ENCOUNTER
Left message for post-operative patient to please call the office with any questions and/or concerns. Reminded patient of next RN appointment on 10/1, OT appointment on 9/20 and MD appointment on 10/7. Dr. Rios Henning notified.

## 2021-09-20 ENCOUNTER — OFFICE VISIT (OUTPATIENT)
Dept: SURGERY | Facility: CLINIC | Age: 56
End: 2021-09-20
Payer: COMMERCIAL

## 2021-09-20 DIAGNOSIS — M62.81 DISTAL MUSCLE WEAKNESS: ICD-10-CM

## 2021-09-20 DIAGNOSIS — M25.642 STIFFNESS OF JOINT, HAND, LEFT: Primary | ICD-10-CM

## 2021-09-20 NOTE — PROGRESS NOTES
Carpal Tunnel Post - Op Note:    Subjective: I hope this goes better than the right side.       Objective:  Occupational Therapy completed the following educational areas status post elective carpal tunnel procedure:    1)  Dressing was removed and handwash

## 2021-09-24 ENCOUNTER — TELEPHONE (OUTPATIENT)
Dept: SURGERY | Facility: CLINIC | Age: 56
End: 2021-09-24

## 2021-09-24 NOTE — TELEPHONE ENCOUNTER
Disability forms received in forms department, logged for processing, valid [employer] auth attached with forms.

## 2021-10-01 ENCOUNTER — NURSE ONLY (OUTPATIENT)
Dept: SURGERY | Facility: CLINIC | Age: 56
End: 2021-10-01
Payer: COMMERCIAL

## 2021-10-01 DIAGNOSIS — G56.03 BILATERAL CARPAL TUNNEL SYNDROME: ICD-10-CM

## 2021-10-01 DIAGNOSIS — Z48.02 ENCOUNTER FOR REMOVAL OF SUTURES: Primary | ICD-10-CM

## 2021-10-01 RX ORDER — HYDROCHLOROTHIAZIDE 12.5 MG/1
12.5 TABLET ORAL DAILY
COMMUNITY
Start: 2021-09-04 | End: 2022-01-26 | Stop reason: ALTCHOICE

## 2021-10-01 NOTE — PROGRESS NOTES
Surgery 1: FIFIR  - Date: 01/15/21  - Days Since: 259    Surgery 2: Jovanna Azevedo  - Date: 09/17/21  - Days Since: 14    Pt here for suture removal to left volar hand  Pt identified w/2 identifiers and orders verified. Pt presents w/band aid C/D/I.   Pt denies s/s

## 2021-10-04 NOTE — TELEPHONE ENCOUNTER
Disability medical request formed scanned emailed to [employer]  HIPPA and FCR signed emailed scanned and placed in [employer]

## 2021-10-04 NOTE — TELEPHONE ENCOUNTER
Dr. Amy Diana,     Please sign off on form: Disability   -Highlight the patient and hit \"Chart\" button.   -In Chart Review, w/in the Encounter tab - click 1 time on the Telephone call encounter for 9/24/21 Scroll down the telephone encounter.  -Click \"s

## 2021-10-07 ENCOUNTER — OFFICE VISIT (OUTPATIENT)
Dept: SURGERY | Facility: CLINIC | Age: 56
End: 2021-10-07
Payer: COMMERCIAL

## 2021-10-07 DIAGNOSIS — G56.03 BILATERAL CARPAL TUNNEL SYNDROME: Primary | ICD-10-CM

## 2021-10-07 DIAGNOSIS — M62.81 DISTAL MUSCLE WEAKNESS: ICD-10-CM

## 2021-10-07 DIAGNOSIS — M25.642 STIFFNESS OF JOINT, HAND, LEFT: Primary | ICD-10-CM

## 2021-10-07 PROCEDURE — 97110 THERAPEUTIC EXERCISES: CPT | Performed by: OCCUPATIONAL THERAPIST

## 2021-10-07 PROCEDURE — 99024 POSTOP FOLLOW-UP VISIT: CPT | Performed by: PLASTIC SURGERY

## 2021-10-07 PROCEDURE — 97166 OT EVAL MOD COMPLEX 45 MIN: CPT | Performed by: OCCUPATIONAL THERAPIST

## 2021-10-07 NOTE — TELEPHONE ENCOUNTER
FMLA received, [employer] was not completed at time of drop off.  Sent [employer] message for HIpaa/FCR completion

## 2021-10-07 NOTE — PROGRESS NOTES
Surgery 1: RECTR  - Date: 01/15/21  - Days Since: 265    Surgery 2: Noa Fitzpatrick  - Date: 09/17/21  - Days Since: 20    Pt here for surgical follow-up of LECTR  States she's doing \"good\"  Denies pain, numbness and tingling at this time  Pt's incisional site is

## 2021-10-07 NOTE — PROGRESS NOTES
OCCUPATIONAL THERAPY EVALUATION:   Vane Diego   [id number]       SUBJECTIVE:    HX of Injury: Left hand pain and numbness. Chief Complaint:   Left hand tightness. Precautions: 2 # lifting restriction with the left hand.   Premorbid Functional Sta treatment plan and concur.    Raymundo Rodríguez MD

## 2021-10-07 NOTE — TELEPHONE ENCOUNTER
Patient dropped off FMLA forms. No fee collected Patient is aware of fee. Per patient. She will pay via My Chart.  Forms scanned emailed and placed in [address]

## 2021-10-12 ENCOUNTER — OFFICE VISIT (OUTPATIENT)
Dept: SURGERY | Facility: CLINIC | Age: 56
End: 2021-10-12
Payer: COMMERCIAL

## 2021-10-12 DIAGNOSIS — M25.642 STIFFNESS OF JOINT, HAND, LEFT: Primary | ICD-10-CM

## 2021-10-12 DIAGNOSIS — M62.81 DISTAL MUSCLE WEAKNESS: ICD-10-CM

## 2021-10-12 PROCEDURE — 97022 WHIRLPOOL THERAPY: CPT | Performed by: OCCUPATIONAL THERAPIST

## 2021-10-12 PROCEDURE — 97110 THERAPEUTIC EXERCISES: CPT | Performed by: OCCUPATIONAL THERAPIST

## 2021-10-12 NOTE — PROGRESS NOTES
Subjective: My left hand is very stiff. Objective:     Current level of performance:  ADL: Independent  Work: Short term disability.   Leisure: Family    Measurements/Tests:  ROM:            N/A      Treatment Provided this day: Fluidotherapy to the [employer]

## 2021-10-15 NOTE — TELEPHONE ENCOUNTER
Dr. Diamond Pascual,     Please sign off on form:  -Highlight the patient and hit \"Chart\" button.   -In Chart Review, w/in the Encounter tab - click 1 time on the Telephone call encounter for 9/24/21Scroll down the telephone encounter.  -Click \"scan on\" blue

## 2021-10-18 NOTE — TELEPHONE ENCOUNTER
Dr. Daria Steward,     Please sign off on form: Disab  -Highlight the patient and hit \"Chart\" button.   -In Chart Review, w/in the Encounter tab - click 1 time on the Telephone call encounter for 9/24/21 Scroll down the telephone encounter.  -Click \"scan on

## 2021-10-19 ENCOUNTER — OFFICE VISIT (OUTPATIENT)
Dept: SURGERY | Facility: CLINIC | Age: 56
End: 2021-10-19
Payer: COMMERCIAL

## 2021-10-19 DIAGNOSIS — M25.642 STIFFNESS OF JOINT, HAND, LEFT: Primary | ICD-10-CM

## 2021-10-19 DIAGNOSIS — M62.81 DISTAL MUSCLE WEAKNESS: ICD-10-CM

## 2021-10-19 PROCEDURE — 97022 WHIRLPOOL THERAPY: CPT | Performed by: OCCUPATIONAL THERAPIST

## 2021-10-19 PROCEDURE — 97035 APP MDLTY 1+ULTRASOUND EA 15: CPT | Performed by: OCCUPATIONAL THERAPIST

## 2021-10-19 NOTE — PROGRESS NOTES
Subjective: I do not feel any better.       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

## 2021-10-21 ENCOUNTER — OFFICE VISIT (OUTPATIENT)
Dept: SURGERY | Facility: CLINIC | Age: 56
End: 2021-10-21
Payer: COMMERCIAL

## 2021-10-21 DIAGNOSIS — M25.642 STIFFNESS OF JOINT, HAND, LEFT: Primary | ICD-10-CM

## 2021-10-21 DIAGNOSIS — M62.81 DISTAL MUSCLE WEAKNESS: ICD-10-CM

## 2021-10-21 NOTE — TELEPHONE ENCOUNTER
Forms completed and faxed to [address] [facility] at [phone]. Sent [employer].

## 2021-10-21 NOTE — PROGRESS NOTES
Subjective: My left hand is tight and sore. Objective:     Current level of performance:  ADL: Independent  Work: On leave  Leisure: Not addressed    Measurements/Tests:  ROM:            N/A      Treatment Provided this day: Up dated work status form.

## 2021-11-08 ENCOUNTER — OFFICE VISIT (OUTPATIENT)
Dept: PHYSICAL MEDICINE AND REHAB | Facility: CLINIC | Age: 56
End: 2021-11-08
Payer: COMMERCIAL

## 2021-11-08 ENCOUNTER — TELEPHONE (OUTPATIENT)
Dept: NEUROLOGY | Facility: CLINIC | Age: 56
End: 2021-11-08

## 2021-11-08 ENCOUNTER — TELEPHONE (OUTPATIENT)
Dept: PHYSICAL MEDICINE AND REHAB | Facility: CLINIC | Age: 56
End: 2021-11-08

## 2021-11-08 ENCOUNTER — TELEPHONE (OUTPATIENT)
Dept: SURGERY | Facility: CLINIC | Age: 56
End: 2021-11-08

## 2021-11-08 VITALS
WEIGHT: 226 LBS | BODY MASS INDEX: 36.32 KG/M2 | SYSTOLIC BLOOD PRESSURE: 120 MMHG | HEIGHT: 66 IN | DIASTOLIC BLOOD PRESSURE: 88 MMHG

## 2021-11-08 DIAGNOSIS — M79.642 BILATERAL HAND PAIN: Primary | ICD-10-CM

## 2021-11-08 DIAGNOSIS — E66.9 OBESITY (BMI 30-39.9): ICD-10-CM

## 2021-11-08 DIAGNOSIS — M79.641 BILATERAL HAND PAIN: Primary | ICD-10-CM

## 2021-11-08 DIAGNOSIS — G56.03 BILATERAL CARPAL TUNNEL SYNDROME: ICD-10-CM

## 2021-11-08 DIAGNOSIS — Z82.49 FAMILY HISTORY OF PREMATURE CAD: ICD-10-CM

## 2021-11-08 PROCEDURE — 3074F SYST BP LT 130 MM HG: CPT | Performed by: PHYSICAL MEDICINE & REHABILITATION

## 2021-11-08 PROCEDURE — 3008F BODY MASS INDEX DOCD: CPT | Performed by: PHYSICAL MEDICINE & REHABILITATION

## 2021-11-08 PROCEDURE — 3079F DIAST BP 80-89 MM HG: CPT | Performed by: PHYSICAL MEDICINE & REHABILITATION

## 2021-11-08 PROCEDURE — 99214 OFFICE O/P EST MOD 30 MIN: CPT | Performed by: PHYSICAL MEDICINE & REHABILITATION

## 2021-11-08 NOTE — PROGRESS NOTES
[address]  FOLLOW UP EVALUATION      Chief Complaint: Hand pain    HISTORY OF PRESENT ILLNESS:   Patient presents with:  Carpal Tunnel Syndrome: Patient f/u on L carpal tunnel surgery(Sep 2021) 80% improvem She is not able to wear these in her work shoes however. 2/2/21  Patient is here for follow-up evaluation of right hand numbness and tingling sensation secondary to carpal tunnel syndrome. She recently had a right carpal tunnel release.   However, phil improvement. H&P:  The patient is a 50year old right handed female with no significant past medical history who presents with bilateral hand pain right worse than left. Patient has had pain for the last several years with recent worsening.   She notice hydrochlorothiazide 12.5 MG Oral Tab Take 12.5 mg by mouth daily. • HYDROcodone-acetaminophen 7.5-325 MG Oral Tab Take 1 tablet by mouth every 4 to 6 hours as needed for Pain.  10 tablet 0   • pregabalin (LYRICA) 150 MG Oral Cap Take 1 capsule (150 mg t comprehention intact, spontaneous speech intact  Psychiatric: Mood and affect appropriate    Gait Normal    Musculoskeletal/Neurological Exam:    WRIST/HAND:  Inspection: Notable atrophy of the right APB muscle.   Recent surgical scar in the left hand noted tingling sensation. She did have EMG testing which showed mild carpal tunnel syndrome however after carpal tunnel release she has not noted a significant improvement.   We discussed that she could consider returning back to try and trial of doing her work

## 2021-11-08 NOTE — TELEPHONE ENCOUNTER
Pt submitted disability forms at [facility] .   Scanned to Forms, took orig to [employer]

## 2021-11-08 NOTE — TELEPHONE ENCOUNTER
Michael Sutton for authorization of approval for MRI C-spine wo cpt code 36104. Approved with Service Order: [id number] [address] will contact Pt. for servicing facility then they will release authorization number. Pt. informed of above.  Miguel Lazcano

## 2021-11-08 NOTE — TELEPHONE ENCOUNTER
Pt at our office in person requesting back to work as a  note with no restrictions. Discharged from our office 10/21/21 and referred to physiatry for [facility] tightness and pain post [facility] 9/17/21.   Pt states she saw Dr Jatinder Johnson today and requested

## 2021-11-08 NOTE — TELEPHONE ENCOUNTER
S/w Dr. Kiya Galvez states that she would have to get the note from Dr. Bijal Martinez. She went to ask him for the note and Dr. Bijal Martinez stating she would have to get the letter from Dr. Kiya Galvez, since she was last seen in our office.   Has been off of work 9/17/21

## 2021-11-09 NOTE — TELEPHONE ENCOUNTER
Spoke with Dr. Jayme Lange, patient is allowed to stay off of work until follow up from MRI. Notified patient and sent thru [employer].

## 2021-11-12 NOTE — TELEPHONE ENCOUNTER
Revised disab completed and faxed to [phone]. Pt is aware going on forward all forms must be completed by Dr Emiliano Gaitan office.  Faxed blank disability forms to Dr Sarah Mireles office [phone]

## 2021-11-24 ENCOUNTER — HOSPITAL ENCOUNTER (OUTPATIENT)
Dept: MRI IMAGING | Facility: HOSPITAL | Age: 56
Discharge: HOME | End: 2021-11-24
Attending: PHYSICAL MEDICINE & REHABILITATION
Payer: COMMERCIAL

## 2021-11-24 DIAGNOSIS — M79.641 BILATERAL HAND PAIN: ICD-10-CM

## 2021-11-24 DIAGNOSIS — M79.642 BILATERAL HAND PAIN: ICD-10-CM

## 2021-11-24 PROCEDURE — 72141 MRI NECK SPINE W/O DYE: CPT | Performed by: PHYSICAL MEDICINE & REHABILITATION

## 2021-11-24 NOTE — TELEPHONE ENCOUNTER
Received more disability for pt, forward forms to Dr Zuñiga Wayne office for completion [phone]

## 2021-11-30 ENCOUNTER — PATIENT MESSAGE (OUTPATIENT)
Dept: FAMILY MEDICINE CLINIC | Facility: CLINIC | Age: 56
End: 2021-11-30

## 2021-12-01 NOTE — TELEPHONE ENCOUNTER
From: Cristal Javier  To: Hanny Hogan.  Benjy Rivera MD  Sent: 11/30/2021 4:29 PM CST  Subject: Other    I was last seen by Dr Leida Sanchez I did MRI & need a letter stating if he wants me to return back to work

## 2021-12-02 ENCOUNTER — TELEPHONE (OUTPATIENT)
Dept: PHYSICAL MEDICINE AND REHAB | Facility: CLINIC | Age: 56
End: 2021-12-02

## 2021-12-02 ENCOUNTER — TELEPHONE (OUTPATIENT)
Dept: SURGERY | Facility: CLINIC | Age: 56
End: 2021-12-02

## 2021-12-03 ENCOUNTER — VIRTUAL PHONE E/M (OUTPATIENT)
Dept: PHYSICAL MEDICINE AND REHAB | Facility: CLINIC | Age: 56
End: 2021-12-03
Payer: COMMERCIAL

## 2021-12-03 ENCOUNTER — TELEPHONE (OUTPATIENT)
Dept: PHYSICAL MEDICINE AND REHAB | Facility: CLINIC | Age: 56
End: 2021-12-03

## 2021-12-03 DIAGNOSIS — M79.642 BILATERAL HAND PAIN: ICD-10-CM

## 2021-12-03 DIAGNOSIS — M54.12 CERVICAL RADICULOPATHY: Primary | ICD-10-CM

## 2021-12-03 DIAGNOSIS — M79.641 BILATERAL HAND PAIN: ICD-10-CM

## 2021-12-03 PROCEDURE — 99214 OFFICE O/P EST MOD 30 MIN: CPT | Performed by: PHYSICAL MEDICINE & REHABILITATION

## 2021-12-03 RX ORDER — DIAZEPAM 10 MG/1
10 TABLET ORAL ONCE
Qty: 1 TABLET | Refills: 0 | Status: SHIPPED | OUTPATIENT
Start: 2021-12-03 | End: 2021-12-03

## 2021-12-03 NOTE — PROGRESS NOTES
[address]    Telemedicine Visit - Follow Up Evaluation    Telehealth Verbal Consent   I conducted a telehealth visit with Mervat Calderón today, 12/03/21, which was completed using two-way, real-time inte worse than right. She continues to have numbness tingling sensation in both hands. She has not been working as a result of this pain this whole time. She denies any changes in strength sensation or bowel bladder.   She continues to take Lyrica twice sheree 52        throat/lung ca (smoker)   • Breast Cancer Maternal Cousin Female 45   • Cancer Maternal Uncle 61        colon ca   • Breast Cancer Paternal Cousin Female 46          SOCIAL HISTORY:   Social History    Tobacco Use      Smoking status: Never Smoke cord and causes moderate stenosis centrally as well as a central broad-based disc protrusion at C3-4 with abutment of the ventral cord without any significant spinal or foraminal stenosis and a disc osteophyte complex at C5-6 causing moderate foraminal and respectively unless deemed necessary. My staff will be reaching out to the patient for the elective procedure when it is considered appropriate and the patient can follow-up in office as well when appropriate.   In the meantime, I will be available for tel

## 2021-12-03 NOTE — TELEPHONE ENCOUNTER
Initiated authorization for C7-T1 Interlaminar Epidural Steroid Injection   CPT [id number] dx:M54.12 to be done at [facility] with Michael online  Case #[id number].     Status: Approved with authorization #[id number] valid 12/3/21-3/3/22

## 2021-12-03 NOTE — TELEPHONE ENCOUNTER
Patient has been scheduled for a  C7-T1 Interlaminar Epidural Steroid Injection  on 12/27/21 at the [facility]. Medications and allergies reviewed.  Patient informed to hold aspirins, nsaids, blood thinners, multivitamins, phentermine, vitamin E and fish oils 3

## 2021-12-06 ENCOUNTER — TELEPHONE (OUTPATIENT)
Dept: PHYSICAL MEDICINE AND REHAB | Facility: CLINIC | Age: 56
End: 2021-12-06

## 2021-12-06 NOTE — TELEPHONE ENCOUNTER
Pt needs a specific  letter with details for HR (Restriction Letter ) Needs to be specific thing she can do or not. Please call pt back for details

## 2021-12-06 NOTE — TELEPHONE ENCOUNTER
Patient would like to add these restrictions for this note. No standing/bending/lifting/pulling 25lbs. Breaks when sitting for too long. Frequent breaks 5mins. Please advise.

## 2021-12-07 NOTE — TELEPHONE ENCOUNTER
Patients employer Jose Li) called and is asking to speak with a nurse regarding this patients work restrictions. Please call [phone] his name is Anel-  (pronouns \"show\"). They has specific questions.

## 2021-12-07 NOTE — TELEPHONE ENCOUNTER
Nichole Aquino has was inquiring on sedentary work restrictions, patient was off of work and now back to work with restrictions of no sitting, standing, lifting and pulling.     He was just trying to figure out if patient had hand restrictions, why is she not a

## 2021-12-15 ENCOUNTER — TELEPHONE (OUTPATIENT)
Dept: PHYSICAL MEDICINE AND REHAB | Facility: CLINIC | Age: 56
End: 2021-12-15

## 2021-12-15 NOTE — TELEPHONE ENCOUNTER
Kelly Biggs called with patient stating that her referral should be external and not internal according to her insurance with Carmen Maldonado is asking is there am edical reason on why she is interal? Case number [id number] was given by Kelly Biggs as Yenni Camejo.  P

## 2021-12-15 NOTE — TELEPHONE ENCOUNTER
Jose Daniel Dumont has from billing dept has questions in regards of locations of MRI    Please advise

## 2021-12-16 NOTE — TELEPHONE ENCOUNTER
T/t [email or web address]. Additional information was provided. Per her request, faxed letter of explanation of MRI approval with denial of [facility] as servicing facility.

## 2021-12-20 ENCOUNTER — TELEPHONE (OUTPATIENT)
Dept: PHYSICAL MEDICINE AND REHAB | Facility: CLINIC | Age: 56
End: 2021-12-20

## 2021-12-20 NOTE — TELEPHONE ENCOUNTER
Received call from [facility], pt interested in doing inj under MAC. Per Dr. Eliseo Pitt, highly recommends inj to remain under local anesthesia + valium PO. Pt notified and agrees with plan.

## 2021-12-23 NOTE — TELEPHONE ENCOUNTER
Spoke with Speedy Nelson- states patient would like MAC, per Dr. Kathi Quinn, patient is to be on Local anesthesia with Valium. Per Rae's note on 12/20/21 below patient was notified of this.

## 2021-12-24 ENCOUNTER — LAB REQUISITION (OUTPATIENT)
Dept: SURGERY | Age: 56
End: 2021-12-24
Payer: COMMERCIAL

## 2021-12-24 DIAGNOSIS — Z01.818 PREOP EXAMINATION: ICD-10-CM

## 2021-12-27 ENCOUNTER — TELEPHONE (OUTPATIENT)
Dept: PHYSICAL MEDICINE AND REHAB | Facility: CLINIC | Age: 56
End: 2021-12-27

## 2021-12-27 ENCOUNTER — OFFICE VISIT (OUTPATIENT)
Dept: SURGERY | Facility: CLINIC | Age: 56
End: 2021-12-27

## 2021-12-27 DIAGNOSIS — M54.12 CERVICAL RADICULOPATHY: Primary | ICD-10-CM

## 2021-12-27 PROCEDURE — 62321 NJX INTERLAMINAR CRV/THRC: CPT | Performed by: PHYSICAL MEDICINE & REHABILITATION

## 2021-12-27 NOTE — PROCEDURES
[address].    CERVICAL INTERLAMINAR  NAME:  Leni Hernandez    MR #:    [id number] :  1965     PHYSICIAN:  Lasha Mohamud. Tramaine Palencia DO        Operative Report    DATE OF PROCEDURE: 2021   PREOPERATIVE DIAGNOSES: 1.  Cervical fluid, or air was aspirated at anytime.     Vika Cochran DO, FAAPMR & CAQSM  Physical Medicine and Rehabilitation/Sports Medicine  MEDICAL CENTER [facility]

## 2022-01-06 ENCOUNTER — TELEMEDICINE (OUTPATIENT)
Dept: TELEHEALTH | Age: 57
End: 2022-01-06

## 2022-01-06 DIAGNOSIS — Z02.9 ADMINISTRATIVE ENCOUNTER: Primary | ICD-10-CM

## 2022-01-06 PROCEDURE — 99499 UNLISTED E&M SERVICE: CPT | Performed by: NURSE PRACTITIONER

## 2022-01-07 NOTE — PROGRESS NOTES
Did not complete Video Visit. Attempted telephone call (No answer). No Charges assessed  for this Video Visit.

## 2022-01-14 PROCEDURE — 3051F HG A1C>EQUAL 7.0%<8.0%: CPT | Performed by: FAMILY MEDICINE

## 2022-01-25 ENCOUNTER — TELEMEDICINE (OUTPATIENT)
Dept: FAMILY MEDICINE CLINIC | Facility: CLINIC | Age: 57
End: 2022-01-25
Payer: COMMERCIAL

## 2022-01-25 VITALS — WEIGHT: 210 LBS | BODY MASS INDEX: 33.75 KG/M2 | HEIGHT: 66 IN | TEMPERATURE: 98 F

## 2022-01-25 DIAGNOSIS — E11.9 TYPE 2 DIABETES MELLITUS WITHOUT COMPLICATION, WITHOUT LONG-TERM CURRENT USE OF INSULIN (HCC): ICD-10-CM

## 2022-01-25 DIAGNOSIS — R06.02 SHORTNESS OF BREATH: ICD-10-CM

## 2022-01-25 DIAGNOSIS — Z99.81 OXYGEN DEPENDENT: ICD-10-CM

## 2022-01-25 DIAGNOSIS — U07.1 ACUTE RESPIRATORY DISTRESS SYNDROME (ARDS) DUE TO COVID-19 VIRUS (HCC): Primary | ICD-10-CM

## 2022-01-25 DIAGNOSIS — R53.1 WEAKNESS: ICD-10-CM

## 2022-01-25 DIAGNOSIS — J80 ACUTE RESPIRATORY DISTRESS SYNDROME (ARDS) DUE TO COVID-19 VIRUS (HCC): Primary | ICD-10-CM

## 2022-01-25 PROCEDURE — 99214 OFFICE O/P EST MOD 30 MIN: CPT | Performed by: FAMILY MEDICINE

## 2022-01-25 RX ORDER — LANCETS 33 GAUGE
EACH MISCELLANEOUS
COMMUNITY
Start: 2022-01-18

## 2022-01-25 RX ORDER — ONDANSETRON 4 MG/1
4 TABLET, ORALLY DISINTEGRATING ORAL
COMMUNITY
Start: 2022-01-19 | End: 2022-01-29

## 2022-01-25 RX ORDER — DIAZEPAM 10 MG/1
TABLET ORAL
COMMUNITY
Start: 2021-12-03 | End: 2022-01-26 | Stop reason: ALTCHOICE

## 2022-01-25 RX ORDER — BLOOD SUGAR DIAGNOSTIC
STRIP MISCELLANEOUS
COMMUNITY
Start: 2022-01-18

## 2022-01-25 RX ORDER — L. ACIDOPHILUS/BIFIDO. LONGUM 15 MG
CAPSULE,DELAYED RELEASE (ENTERIC COATED) ORAL
COMMUNITY
Start: 2022-01-18

## 2022-01-25 NOTE — PROGRESS NOTES
This visit is conducted using Telemedicine with live, interactive video and audio during this Coronavirus pandemic. Please note that the following visit was completed using two-way, real-time interactive audio and/or video communication.   This has been bilateral patchy airspace and interstitial opacities with a mid to lower lung predominance suspicious for multifocal pneumonia including viral pneumonia.  No pleural effusion or pneumothorax identified.      Normal heart size allowing for AP portable techni Bilateral carpal tunnel syndrome     FH: breast cancer in first degree relative     Obesity (BMI 30-39. 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

## 2022-01-26 ENCOUNTER — TELEPHONE (OUTPATIENT)
Dept: FAMILY MEDICINE CLINIC | Facility: CLINIC | Age: 57
End: 2022-01-26

## 2022-01-26 PROBLEM — J80 ACUTE RESPIRATORY DISTRESS SYNDROME (ARDS) DUE TO COVID-19 VIRUS (HCC): Status: ACTIVE | Noted: 2022-01-26

## 2022-01-26 PROBLEM — E11.9 TYPE 2 DIABETES MELLITUS WITHOUT COMPLICATION, WITHOUT LONG-TERM CURRENT USE OF INSULIN (HCC): Status: ACTIVE | Noted: 2022-01-26

## 2022-01-26 PROBLEM — U07.1 ACUTE RESPIRATORY DISTRESS SYNDROME (ARDS) DUE TO COVID-19 VIRUS (HCC): Status: ACTIVE | Noted: 2022-01-26

## 2022-01-26 NOTE — TELEPHONE ENCOUNTER
Pt states that she does have Oxygen at home. She doesn't have a pulse oximeter but is getting one today. States symptoms are better. No fever. No audible shortness of breath. Able to speak in sentences. Pt has f/u appt with PCP on 02/01.

## 2022-02-01 ENCOUNTER — OFFICE VISIT (OUTPATIENT)
Dept: FAMILY MEDICINE CLINIC | Facility: CLINIC | Age: 57
End: 2022-02-01
Payer: COMMERCIAL

## 2022-02-01 ENCOUNTER — TELEPHONE (OUTPATIENT)
Dept: FAMILY MEDICINE CLINIC | Facility: CLINIC | Age: 57
End: 2022-02-01

## 2022-02-01 ENCOUNTER — APPOINTMENT (OUTPATIENT)
Dept: LAB | Age: 57
End: 2022-02-01
Attending: FAMILY MEDICINE
Payer: COMMERCIAL

## 2022-02-01 ENCOUNTER — LAB ENCOUNTER (OUTPATIENT)
Dept: LAB | Age: 57
End: 2022-02-01
Attending: FAMILY MEDICINE
Payer: COMMERCIAL

## 2022-02-01 VITALS
HEART RATE: 101 BPM | BODY MASS INDEX: 35.36 KG/M2 | DIASTOLIC BLOOD PRESSURE: 86 MMHG | HEIGHT: 66 IN | RESPIRATION RATE: 26 BRPM | WEIGHT: 220 LBS | OXYGEN SATURATION: 89 % | SYSTOLIC BLOOD PRESSURE: 119 MMHG

## 2022-02-01 DIAGNOSIS — H53.8 BLURRY VISION, BILATERAL: ICD-10-CM

## 2022-02-01 DIAGNOSIS — R53.1 WEAKNESS: ICD-10-CM

## 2022-02-01 DIAGNOSIS — R09.02 HYPOXEMIA: ICD-10-CM

## 2022-02-01 DIAGNOSIS — U07.1 ACUTE RESPIRATORY DISTRESS SYNDROME (ARDS) DUE TO COVID-19 VIRUS (HCC): Primary | ICD-10-CM

## 2022-02-01 DIAGNOSIS — J80 ACUTE RESPIRATORY DISTRESS SYNDROME (ARDS) DUE TO COVID-19 VIRUS (HCC): Primary | ICD-10-CM

## 2022-02-01 DIAGNOSIS — R06.02 SHORTNESS OF BREATH: ICD-10-CM

## 2022-02-01 DIAGNOSIS — E66.9 OBESITY (BMI 30-39.9): ICD-10-CM

## 2022-02-01 DIAGNOSIS — E11.9 TYPE 2 DIABETES MELLITUS WITHOUT COMPLICATION, WITHOUT LONG-TERM CURRENT USE OF INSULIN (HCC): ICD-10-CM

## 2022-02-01 DIAGNOSIS — Z99.81 OXYGEN DEPENDENT: ICD-10-CM

## 2022-02-01 DIAGNOSIS — R79.89 ELEVATED LFTS: ICD-10-CM

## 2022-02-01 DIAGNOSIS — IMO0002 UNCONTROLLED TYPE 2 DIABETES MELLITUS, WITHOUT LONG-TERM CURRENT USE OF INSULIN: ICD-10-CM

## 2022-02-01 PROBLEM — E11.65 UNCONTROLLED TYPE 2 DIABETES MELLITUS, WITHOUT LONG-TERM CURRENT USE OF INSULIN (HCC): Status: ACTIVE | Noted: 2022-01-26

## 2022-02-01 LAB
ALBUMIN SERPL-MCNC: 3.1 G/DL (ref 3.4–5)
ALBUMIN/GLOB SERPL: 0.9 {RATIO} (ref 1–2)
ALP LIVER SERPL-CCNC: 84 U/L
ALT SERPL-CCNC: 60 U/L
ANION GAP SERPL CALC-SCNC: 7 MMOL/L (ref 0–18)
AST SERPL-CCNC: 39 U/L (ref 15–37)
BASOPHILS # BLD AUTO: 0.03 X10(3) UL (ref 0–0.2)
BASOPHILS NFR BLD AUTO: 0.5 %
BILIRUB SERPL-MCNC: 0.7 MG/DL (ref 0.1–2)
BUN BLD-MCNC: 4 MG/DL (ref 7–18)
BUN/CREAT SERPL: 5.7 (ref 10–20)
CALCIUM BLD-MCNC: 9.7 MG/DL (ref 8.5–10.1)
CHLORIDE SERPL-SCNC: 110 MMOL/L (ref 98–112)
CO2 SERPL-SCNC: 27 MMOL/L (ref 21–32)
CREAT BLD-MCNC: 0.7 MG/DL
CREAT UR-SCNC: 193 MG/DL
D DIMER PPP FEU-MCNC: 0.49 UG/ML FEU (ref ?–0.54)
DEPRECATED RDW RBC AUTO: 47.7 FL (ref 35.1–46.3)
EOSINOPHIL # BLD AUTO: 0.25 X10(3) UL (ref 0–0.7)
EOSINOPHIL NFR BLD AUTO: 4.3 %
ERYTHROCYTE [DISTWIDTH] IN BLOOD BY AUTOMATED COUNT: 14.6 % (ref 11–15)
FASTING STATUS PATIENT QL REPORTED: YES
GLOBULIN PLAS-MCNC: 3.3 G/DL (ref 2.8–4.4)
GLUCOSE BLD-MCNC: 111 MG/DL (ref 70–99)
HCT VFR BLD AUTO: 43.3 %
HGB BLD-MCNC: 14.4 G/DL
IMM GRANULOCYTES # BLD AUTO: 0.01 X10(3) UL (ref 0–1)
IMM GRANULOCYTES NFR BLD: 0.2 %
LYMPHOCYTES # BLD AUTO: 1.99 X10(3) UL (ref 1–4)
MCH RBC QN AUTO: 30.3 PG (ref 26–34)
MCHC RBC AUTO-ENTMCNC: 33.3 G/DL (ref 31–37)
MCV RBC AUTO: 91 FL
MICROALBUMIN UR-MCNC: 9 MG/DL
MICROALBUMIN/CREAT 24H UR-RTO: 46.6 UG/MG (ref ?–30)
MONOCYTES # BLD AUTO: 0.42 X10(3) UL (ref 0.1–1)
MONOCYTES NFR BLD AUTO: 7.3 %
NEUTROPHILS # BLD AUTO: 3.06 X10(3) UL (ref 1.5–7.7)
NEUTROPHILS NFR BLD AUTO: 53.2 %
OSMOLALITY SERPL CALC.SUM OF ELEC: 296 MOSM/KG (ref 275–295)
PLATELET # BLD AUTO: 198 10(3)UL (ref 150–450)
POTASSIUM SERPL-SCNC: 3.9 MMOL/L (ref 3.5–5.1)
PROT SERPL-MCNC: 6.4 G/DL (ref 6.4–8.2)
RBC # BLD AUTO: 4.76 X10(6)UL
SODIUM SERPL-SCNC: 144 MMOL/L (ref 136–145)
WBC # BLD AUTO: 5.8 X10(3) UL (ref 4–11)

## 2022-02-01 PROCEDURE — 36415 COLL VENOUS BLD VENIPUNCTURE: CPT | Performed by: FAMILY MEDICINE

## 2022-02-01 PROCEDURE — 85379 FIBRIN DEGRADATION QUANT: CPT | Performed by: FAMILY MEDICINE

## 2022-02-01 PROCEDURE — 3074F SYST BP LT 130 MM HG: CPT | Performed by: FAMILY MEDICINE

## 2022-02-01 PROCEDURE — 85025 COMPLETE CBC W/AUTO DIFF WBC: CPT | Performed by: FAMILY MEDICINE

## 2022-02-01 PROCEDURE — 3060F POS MICROALBUMINURIA REV: CPT | Performed by: FAMILY MEDICINE

## 2022-02-01 PROCEDURE — 80053 COMPREHEN METABOLIC PANEL: CPT | Performed by: FAMILY MEDICINE

## 2022-02-01 PROCEDURE — 82043 UR ALBUMIN QUANTITATIVE: CPT | Performed by: FAMILY MEDICINE

## 2022-02-01 PROCEDURE — 82570 ASSAY OF URINE CREATININE: CPT | Performed by: FAMILY MEDICINE

## 2022-02-01 PROCEDURE — 3008F BODY MASS INDEX DOCD: CPT | Performed by: FAMILY MEDICINE

## 2022-02-01 PROCEDURE — 3061F NEG MICROALBUMINURIA REV: CPT | Performed by: FAMILY MEDICINE

## 2022-02-01 PROCEDURE — 99214 OFFICE O/P EST MOD 30 MIN: CPT | Performed by: FAMILY MEDICINE

## 2022-02-01 PROCEDURE — 3079F DIAST BP 80-89 MM HG: CPT | Performed by: FAMILY MEDICINE

## 2022-02-14 ENCOUNTER — HOSPITAL ENCOUNTER (OUTPATIENT)
Dept: GENERAL RADIOLOGY | Age: 57
Discharge: HOME | End: 2022-02-14
Attending: FAMILY MEDICINE
Payer: COMMERCIAL

## 2022-02-14 DIAGNOSIS — U07.1 ACUTE RESPIRATORY DISTRESS SYNDROME (ARDS) DUE TO COVID-19 VIRUS (HCC): ICD-10-CM

## 2022-02-14 DIAGNOSIS — R06.02 SHORTNESS OF BREATH: ICD-10-CM

## 2022-02-14 DIAGNOSIS — J80 ACUTE RESPIRATORY DISTRESS SYNDROME (ARDS) DUE TO COVID-19 VIRUS (HCC): ICD-10-CM

## 2022-02-14 DIAGNOSIS — R09.02 HYPOXEMIA: ICD-10-CM

## 2022-02-14 PROCEDURE — 71046 X-RAY EXAM CHEST 2 VIEWS: CPT | Performed by: FAMILY MEDICINE

## 2022-02-14 NOTE — TELEPHONE ENCOUNTER
Dr. Dickson Reason,     Please sign off on form:Disablity   -Highlight the patient and hit \"Chart\" button. -In Chart Review, w/in the Encounter tab - click 1 time on the Telephone call encounter for 2/1/22 Scroll down the telephone encounter.  -Click \"scan on\" blue Hyperlink under \"Media\" heading for Disab Dr Dickson Reason 2/14/22  w/in the telephone enc.  -Click on Acknowledge button at the bottom right corner and left-click onto image, signature stamp appears and drag signature to Provider signature line. Stamp will turn blue. Close window.      Thank you,    Tim Albarran

## 2022-02-14 NOTE — TELEPHONE ENCOUNTER
Disab completed and faxed to [facility] [phone].  Sent pt [employer] message

## 2022-02-16 ENCOUNTER — TELEPHONE (OUTPATIENT)
Dept: FAMILY MEDICINE CLINIC | Facility: CLINIC | Age: 57
End: 2022-02-16

## 2022-02-16 NOTE — TELEPHONE ENCOUNTER
Pt called stated she spoke with [place] and they have not received pts disability. Pt gave another fax to send disability. Disability faxed again to [facility] at [phone].

## 2022-02-16 NOTE — TELEPHONE ENCOUNTER
Received fax from [employer] forms send to [employer] and original placed in Wm[employer] Konstantin Valenzuela bin

## 2022-03-03 NOTE — TELEPHONE ENCOUNTER
Dr. Sage Aldridge,    Attached FMLA for daughter. Please sign off on form:  -Highlight the patient and hit \"Chart\" button. -In Chart Review, w/in the Encounter tab - click 1 time on the Telephone call encounter for 2/1/22 Scroll down the telephone encounter.  -Click \"scan on\" blue Hyperlink under \"Media\" heading for FMLA for daughter Dr. Sage Aldridge 3/3/22 w/in the telephone enc.  -Click on Acknowledge button at the bottom right corner and left-click onto image, signature stamp appears and drag signature to Provider signature line. Stamp will turn blue. Close window.      Thank you,    Sophia Gallegos

## 2022-03-05 ENCOUNTER — OFFICE VISIT (OUTPATIENT)
Dept: FAMILY MEDICINE CLINIC | Facility: CLINIC | Age: 57
End: 2022-03-05
Payer: COMMERCIAL

## 2022-03-05 ENCOUNTER — TELEPHONE (OUTPATIENT)
Dept: DERMATOLOGY CLINIC | Facility: CLINIC | Age: 57
End: 2022-03-05

## 2022-03-05 VITALS
HEART RATE: 78 BPM | DIASTOLIC BLOOD PRESSURE: 87 MMHG | OXYGEN SATURATION: 97 % | HEIGHT: 66 IN | SYSTOLIC BLOOD PRESSURE: 130 MMHG | BODY MASS INDEX: 35.36 KG/M2 | WEIGHT: 220 LBS | TEMPERATURE: 97 F

## 2022-03-05 DIAGNOSIS — J80 ACUTE RESPIRATORY DISTRESS SYNDROME (ARDS) DUE TO COVID-19 VIRUS (HCC): ICD-10-CM

## 2022-03-05 DIAGNOSIS — Z23 NEED FOR INFLUENZA VACCINATION: ICD-10-CM

## 2022-03-05 DIAGNOSIS — R79.89 ELEVATED LFTS: ICD-10-CM

## 2022-03-05 DIAGNOSIS — U07.1 ACUTE RESPIRATORY DISTRESS SYNDROME (ARDS) DUE TO COVID-19 VIRUS (HCC): ICD-10-CM

## 2022-03-05 DIAGNOSIS — Z86.16 HISTORY OF COVID-19: Primary | ICD-10-CM

## 2022-03-05 DIAGNOSIS — Z23 NEED FOR PNEUMOCOCCAL VACCINATION: ICD-10-CM

## 2022-03-05 DIAGNOSIS — Z99.81 OXYGEN DEPENDENT: ICD-10-CM

## 2022-03-05 DIAGNOSIS — R09.02 HYPOXEMIA: ICD-10-CM

## 2022-03-05 DIAGNOSIS — E11.9 TYPE 2 DIABETES MELLITUS WITHOUT COMPLICATION, WITHOUT LONG-TERM CURRENT USE OF INSULIN (HCC): ICD-10-CM

## 2022-03-05 PROCEDURE — 3079F DIAST BP 80-89 MM HG: CPT | Performed by: FAMILY MEDICINE

## 2022-03-05 PROCEDURE — 3075F SYST BP GE 130 - 139MM HG: CPT | Performed by: FAMILY MEDICINE

## 2022-03-05 PROCEDURE — 3008F BODY MASS INDEX DOCD: CPT | Performed by: FAMILY MEDICINE

## 2022-03-05 PROCEDURE — 99214 OFFICE O/P EST MOD 30 MIN: CPT | Performed by: FAMILY MEDICINE

## 2022-03-08 ENCOUNTER — PATIENT MESSAGE (OUTPATIENT)
Dept: PHYSICAL MEDICINE AND REHAB | Facility: CLINIC | Age: 57
End: 2022-03-08

## 2022-03-08 RX ORDER — PREGABALIN 150 MG/1
150 CAPSULE ORAL 2 TIMES DAILY
Qty: 180 CAPSULE | Refills: 1 | Status: SHIPPED | OUTPATIENT
Start: 2022-03-08

## 2022-03-08 RX ORDER — PREGABALIN 150 MG/1
150 CAPSULE ORAL 2 TIMES DAILY
Qty: 180 CAPSULE | Refills: 1 | Status: CANCELLED | OUTPATIENT
Start: 2022-03-08

## 2022-03-08 NOTE — TELEPHONE ENCOUNTER
From: Elijah Screen  To: Amber Cortez.  Erin Bland DO  Sent: 3/8/2022 9:42 AM CST  Subject: Re:refill     I put in a request for a refill for the Pregablin & I was denied I only have a few pills left

## 2022-03-21 ENCOUNTER — HOSPITAL ENCOUNTER (OUTPATIENT)
Dept: CT IMAGING | Facility: HOSPITAL | Age: 57
Discharge: HOME | End: 2022-03-21
Attending: INTERNAL MEDICINE
Payer: COMMERCIAL

## 2022-03-21 DIAGNOSIS — U07.1 COVID: ICD-10-CM

## 2022-03-21 PROCEDURE — 71250 CT THORAX DX C-: CPT | Performed by: INTERNAL MEDICINE

## 2022-03-22 NOTE — TELEPHONE ENCOUNTER
Pt states when she was last seen on 03/05 she was told RTW would be in 2 months. Do you support extension of RTW on 05/05/22?

## 2022-03-22 NOTE — TELEPHONE ENCOUNTER
Informed pt she would have to send Disability forms to her pulmonologist. Pt will elisbaeth back with fax number to fax out forms

## 2022-03-23 ENCOUNTER — HOSPITAL ENCOUNTER (OUTPATIENT)
Dept: CT IMAGING | Age: 57
Discharge: HOME | End: 2022-03-23
Attending: INTERNAL MEDICINE
Payer: COMMERCIAL

## 2022-03-23 ENCOUNTER — OFFICE VISIT (OUTPATIENT)
Dept: OPHTHALMOLOGY | Facility: CLINIC | Age: 57
End: 2022-03-23
Payer: COMMERCIAL

## 2022-03-23 ENCOUNTER — TELEPHONE (OUTPATIENT)
Dept: FAMILY MEDICINE CLINIC | Facility: CLINIC | Age: 57
End: 2022-03-23

## 2022-03-23 DIAGNOSIS — H52.4 MYOPIA OF BOTH EYES WITH REGULAR ASTIGMATISM AND PRESBYOPIA: ICD-10-CM

## 2022-03-23 DIAGNOSIS — H52.223 MYOPIA OF BOTH EYES WITH REGULAR ASTIGMATISM AND PRESBYOPIA: ICD-10-CM

## 2022-03-23 DIAGNOSIS — H52.13 MYOPIA OF BOTH EYES WITH REGULAR ASTIGMATISM AND PRESBYOPIA: ICD-10-CM

## 2022-03-23 DIAGNOSIS — R93.89 ABNORMAL CT OF THE CHEST: ICD-10-CM

## 2022-03-23 DIAGNOSIS — E11.9 DIABETES MELLITUS TYPE 2 WITHOUT RETINOPATHY (HCC): Primary | ICD-10-CM

## 2022-03-23 DIAGNOSIS — H00.14 CHALAZION LEFT UPPER EYELID: ICD-10-CM

## 2022-03-23 PROCEDURE — 92004 COMPRE OPH EXAM NEW PT 1/>: CPT | Performed by: OPHTHALMOLOGY

## 2022-03-23 PROCEDURE — 2023F DILAT RTA XM W/O RTNOPTHY: CPT | Performed by: OPHTHALMOLOGY

## 2022-03-23 PROCEDURE — 71250 CT THORAX DX C-: CPT | Performed by: INTERNAL MEDICINE

## 2022-03-23 PROCEDURE — 92015 DETERMINE REFRACTIVE STATE: CPT | Performed by: OPHTHALMOLOGY

## 2022-03-23 NOTE — PROGRESS NOTES
Let her know that the finding in the esophagus is gone, so this was likely a piece of food or pill or candy like she suspected

## 2022-03-23 NOTE — PATIENT INSTRUCTIONS
Diabetes mellitus type 2 without retinopathy ([facility] [address]  Diabetes type II: no background of retinopathy, no signs of neovascularization noted. Discussed ocular and systemic benefits of blood sugar control. Diagnosis and treatment discussed in detail with patient. Myopia of both eyes with regular astigmatism and presbyopia  New glasses today; update as needed. Discussed that new prescription is only a slight change. Chalazion left upper eyelid  Refer to Dr. Miguel Menezes for evaluation and treatment.

## 2022-03-23 NOTE — TELEPHONE ENCOUNTER
Received forms for short term disability from [employer] forms placed in [employer] bin and original one emailed to forms dept.

## 2022-04-09 ENCOUNTER — APPOINTMENT (OUTPATIENT)
Dept: ENDOCRINOLOGY | Facility: HOSPITAL | Age: 57
End: 2022-04-09
Attending: FAMILY MEDICINE
Payer: COMMERCIAL

## 2022-04-21 ENCOUNTER — TELEPHONE (OUTPATIENT)
Dept: FAMILY MEDICINE CLINIC | Facility: CLINIC | Age: 57
End: 2022-04-21

## 2022-04-21 NOTE — TELEPHONE ENCOUNTER
[facility] been receiving [facility] post hospitalization (1/6/22-1/19th)  and orders were getting faxed to wrong provider with same last name \"Ramu\". Patient did do hospital f/u 1/25/22 video visit and subsequent OV 2/1/22, 3/5/22. [facility] will fax [facility] orders for correct provider to sign off.

## 2022-06-30 ENCOUNTER — TELEMEDICINE (OUTPATIENT)
Dept: FAMILY MEDICINE CLINIC | Facility: CLINIC | Age: 57
End: 2022-06-30
Payer: COMMERCIAL

## 2022-06-30 DIAGNOSIS — E11.9 TYPE 2 DIABETES MELLITUS WITHOUT COMPLICATION, WITHOUT LONG-TERM CURRENT USE OF INSULIN (HCC): ICD-10-CM

## 2022-06-30 DIAGNOSIS — R10.32 LEFT LOWER QUADRANT ABDOMINAL PAIN: Primary | ICD-10-CM

## 2022-06-30 PROCEDURE — 99214 OFFICE O/P EST MOD 30 MIN: CPT | Performed by: FAMILY MEDICINE

## 2022-07-23 DIAGNOSIS — M79.89 LEG SWELLING: ICD-10-CM

## 2022-07-23 RX ORDER — HYDROCHLOROTHIAZIDE 12.5 MG/1
TABLET ORAL
Qty: 90 TABLET | Refills: 3 | OUTPATIENT
Start: 2022-07-23

## 2022-08-25 ENCOUNTER — OFFICE VISIT (OUTPATIENT)
Dept: PHYSICAL MEDICINE AND REHAB | Facility: CLINIC | Age: 57
End: 2022-08-25
Payer: COMMERCIAL

## 2022-08-25 VITALS
DIASTOLIC BLOOD PRESSURE: 80 MMHG | HEART RATE: 73 BPM | WEIGHT: 230 LBS | OXYGEN SATURATION: 100 % | SYSTOLIC BLOOD PRESSURE: 130 MMHG | HEIGHT: 65 IN | BODY MASS INDEX: 38.32 KG/M2

## 2022-08-25 DIAGNOSIS — G56.03 CARPAL TUNNEL SYNDROME, BILATERAL UPPER LIMBS: Primary | ICD-10-CM

## 2022-08-25 DIAGNOSIS — Z98.890 S/P BILATERAL CARPAL TUNNEL RELEASE: ICD-10-CM

## 2022-08-25 PROCEDURE — 3008F BODY MASS INDEX DOCD: CPT | Performed by: PHYSICAL MEDICINE & REHABILITATION

## 2022-08-25 PROCEDURE — 3075F SYST BP GE 130 - 139MM HG: CPT | Performed by: PHYSICAL MEDICINE & REHABILITATION

## 2022-08-25 PROCEDURE — 3079F DIAST BP 80-89 MM HG: CPT | Performed by: PHYSICAL MEDICINE & REHABILITATION

## 2022-08-25 PROCEDURE — 99214 OFFICE O/P EST MOD 30 MIN: CPT | Performed by: PHYSICAL MEDICINE & REHABILITATION

## 2022-08-25 RX ORDER — DULOXETIN HYDROCHLORIDE 30 MG/1
30 CAPSULE, DELAYED RELEASE ORAL DAILY
Qty: 30 CAPSULE | Refills: 0 | Status: SHIPPED | OUTPATIENT
Start: 2022-08-25

## 2022-08-25 RX ORDER — PREGABALIN 150 MG/1
150 CAPSULE ORAL 2 TIMES DAILY
Qty: 180 CAPSULE | Refills: 1 | Status: SHIPPED | OUTPATIENT
Start: 2022-08-25

## 2022-08-25 NOTE — PATIENT INSTRUCTIONS
-Start OT and home exercises  -Voltaren gel 3-4x daily   -Start night splints for both hands  -Follow up in 4 weeks  -Start Cymbalta 30mg daily  -Continue Lyrica 150mg twice daily  -If no better, will discuss injection

## 2022-09-01 ENCOUNTER — TELEPHONE (OUTPATIENT)
Dept: PHYSICAL MEDICINE AND REHAB | Facility: CLINIC | Age: 57
End: 2022-09-01

## 2022-09-01 NOTE — TELEPHONE ENCOUNTER
pt is calling to say the (DULoxetine (CYMBALTA) 30 MG Oral Cap DR Particles) medication is not working out for her, she has to many side effects with it she stopped taking it.      She wants Dr Josie Bautista to prescribe the Pregablin  Please call to discuss

## 2022-09-01 NOTE — TELEPHONE ENCOUNTER
Spoke with patient in regards of Duloxentine 30mg: states she's having  No appetite and diarrhea, hot flashes, and nauseas. States she dropped her Lyrica on the floor would like an early refill. Spoke with Dr. Pearl Gomez ok'd early release rx. Spoke with [employer]. Please view [employer].

## 2022-09-17 ENCOUNTER — OFFICE VISIT (OUTPATIENT)
Dept: FAMILY MEDICINE CLINIC | Facility: CLINIC | Age: 57
End: 2022-09-17
Payer: COMMERCIAL

## 2022-09-17 VITALS
WEIGHT: 229 LBS | SYSTOLIC BLOOD PRESSURE: 117 MMHG | DIASTOLIC BLOOD PRESSURE: 81 MMHG | BODY MASS INDEX: 38.15 KG/M2 | HEART RATE: 64 BPM | TEMPERATURE: 97 F | HEIGHT: 65 IN

## 2022-09-17 DIAGNOSIS — K44.9 HIATAL HERNIA WITHOUT GANGRENE OR OBSTRUCTION: ICD-10-CM

## 2022-09-17 DIAGNOSIS — E66.9 OBESITY (BMI 30-39.9): ICD-10-CM

## 2022-09-17 DIAGNOSIS — E11.9 DIABETES MELLITUS TYPE 2 WITHOUT RETINOPATHY (HCC): ICD-10-CM

## 2022-09-17 DIAGNOSIS — R10.10 UPPER ABDOMINAL PAIN: Primary | ICD-10-CM

## 2022-09-17 DIAGNOSIS — R40.0 DAYTIME SLEEPINESS: ICD-10-CM

## 2022-09-17 PROCEDURE — 99214 OFFICE O/P EST MOD 30 MIN: CPT | Performed by: FAMILY MEDICINE

## 2022-09-17 PROCEDURE — 3074F SYST BP LT 130 MM HG: CPT | Performed by: FAMILY MEDICINE

## 2022-09-17 PROCEDURE — 3079F DIAST BP 80-89 MM HG: CPT | Performed by: FAMILY MEDICINE

## 2022-09-17 PROCEDURE — 3008F BODY MASS INDEX DOCD: CPT | Performed by: FAMILY MEDICINE

## 2022-09-17 RX ORDER — OMEPRAZOLE 20 MG/1
20 CAPSULE, DELAYED RELEASE ORAL
Qty: 30 CAPSULE | Refills: 0 | Status: SHIPPED | OUTPATIENT
Start: 2022-09-17

## 2022-09-19 RX ORDER — DULOXETIN HYDROCHLORIDE 30 MG/1
CAPSULE, DELAYED RELEASE ORAL
Qty: 30 CAPSULE | Refills: 0 | OUTPATIENT
Start: 2022-09-19

## 2022-09-22 ENCOUNTER — TELEPHONE (OUTPATIENT)
Dept: PHYSICAL MEDICINE AND REHAB | Facility: CLINIC | Age: 57
End: 2022-09-22

## 2022-09-22 ENCOUNTER — OFFICE VISIT (OUTPATIENT)
Dept: PHYSICAL MEDICINE AND REHAB | Facility: CLINIC | Age: 57
End: 2022-09-22

## 2022-09-22 VITALS
BODY MASS INDEX: 38 KG/M2 | HEART RATE: 67 BPM | SYSTOLIC BLOOD PRESSURE: 120 MMHG | WEIGHT: 230 LBS | OXYGEN SATURATION: 98 % | DIASTOLIC BLOOD PRESSURE: 78 MMHG

## 2022-09-22 DIAGNOSIS — G56.03 CARPAL TUNNEL SYNDROME, BILATERAL UPPER LIMBS: Primary | ICD-10-CM

## 2022-09-22 RX ORDER — TRIAMCINOLONE ACETONIDE 40 MG/ML
80 INJECTION, SUSPENSION INTRA-ARTICULAR; INTRAMUSCULAR ONCE
Status: COMPLETED | OUTPATIENT
Start: 2022-09-22 | End: 2022-09-22

## 2022-09-22 RX ORDER — LIDOCAINE HYDROCHLORIDE 10 MG/ML
2 INJECTION, SOLUTION INFILTRATION; PERINEURAL ONCE
Status: COMPLETED | OUTPATIENT
Start: 2022-09-22 | End: 2022-09-22

## 2022-09-22 NOTE — TELEPHONE ENCOUNTER
Initiated authorization for Ultrasound guided bilateral carpal tunnel injection with corticosteroid CPT [id number] with [employer]  Confirmation #[id number]  Status: Approved-authorization is not required per health plan

## 2022-09-22 NOTE — PATIENT INSTRUCTIONS
Steroid Injection Information  What to expect: The injection contains Lidocaine (which numbs the area) and Kenalog (a steroid which decreases inflammation). You may have pain relief within hours of the injection due to the Lidocaine. The Kenalog can take a couple days, up to a couple weeks, to reach the full effect. It is also possible to have a slight increase in symptoms over the first few days, but that should resolve fairly quickly. How long will the injection last?: The length of response to an injection is variable. Literally a couple weeks to a couple years. The injection will decrease the inflammation and the pain will return if/when the inflammation returns. Activity Recommendations: For the first 24 hours after injection, keep the area clean and dry. It is ok to shower but don't soak in a tub during that time. No vigorous activity such as running or heavy lifting for the first week but other than that you can gradually resume your normal activities immediately. If you have a significant decrease in pain, be careful not to do too much too soon. Again, the key is GRADUAL resumption of activites. Things to look out for: Common injection side effects include soreness at the injection site, bruising, flushing of the face or skin, and a temporary increase in your blood sugars and/or blood pressure. Infection is very rare but please notify my office [facility] for [address] [phone]) if you develop any fevers, drainage from the injection site, or severe increase in pain. If it is the weekend, go to an Emergency Room.       Follow up in 4 weeks

## 2022-09-23 PROCEDURE — 3044F HG A1C LEVEL LT 7.0%: CPT | Performed by: FAMILY MEDICINE

## 2022-09-24 LAB
ABSOLUTE BASOPHILS: 43 CELLS/UL (ref 0–200)
ABSOLUTE EOSINOPHILS: 128 CELLS/UL (ref 15–500)
ABSOLUTE LYMPHOCYTES: 2315 CELLS/UL (ref 850–3900)
ABSOLUTE MONOCYTES: 398 CELLS/UL (ref 200–950)
ABSOLUTE NEUTROPHILS: 4217 CELLS/UL (ref 1500–7800)
ALBUMIN/GLOBULIN RATIO: 1.4 (CALC) (ref 1–2.5)
ALBUMIN: 4.2 G/DL (ref 3.6–5.1)
ALKALINE PHOSPHATASE: 80 U/L (ref 37–153)
ALT: 41 U/L (ref 6–29)
AST: 48 U/L (ref 10–35)
BASOPHILS: 0.6 %
BILIRUBIN, TOTAL: 0.7 MG/DL (ref 0.2–1.2)
BUN: 9 MG/DL (ref 7–25)
CALCIUM: 9.6 MG/DL (ref 8.6–10.4)
CARBON DIOXIDE: 26 MMOL/L (ref 20–32)
CHLORIDE: 107 MMOL/L (ref 98–110)
CHOL/HDLC RATIO: 3.1 (CALC)
CHOLESTEROL, TOTAL: 144 MG/DL
CREATININE: 0.66 MG/DL (ref 0.5–1.03)
EGFR: 104 ML/MIN/1.73M2
EOSINOPHILS: 1.8 %
GLOBULIN: 2.9 G/DL (CALC) (ref 1.9–3.7)
GLUCOSE: 112 MG/DL (ref 65–99)
HDL CHOLESTEROL: 46 MG/DL
HEMATOCRIT: 44.1 % (ref 35–45)
HEMOGLOBIN A1C: 6 % OF TOTAL HGB
HEMOGLOBIN: 15.4 G/DL (ref 11.7–15.5)
LDL-CHOLESTEROL: 84 MG/DL (CALC)
LIPASE: 52 U/L (ref 7–60)
LYMPHOCYTES: 32.6 %
MCH: 30.7 PG (ref 27–33)
MCHC: 34.9 G/DL (ref 32–36)
MCV: 88 FL (ref 80–100)
MONOCYTES: 5.6 %
MPV: 9.9 FL (ref 7.5–12.5)
NEUTROPHILS: 59.4 %
NON-HDL CHOLESTEROL: 98 MG/DL (CALC)
PLATELET COUNT: 232 THOUSAND/UL (ref 140–400)
POTASSIUM: 4.1 MMOL/L (ref 3.5–5.3)
PROTEIN, TOTAL: 7.1 G/DL (ref 6.1–8.1)
RDW: 12.7 % (ref 11–15)
RED BLOOD CELL COUNT: 5.01 MILLION/UL (ref 3.8–5.1)
RESULT:: NOT DETECTED
SODIUM: 140 MMOL/L (ref 135–146)
T4, FREE: 0.7 NG/DL (ref 0.8–1.8)
TRIGLYCERIDES: 64 MG/DL
TSH W/REFLEX TO FT4: 9.9 MIU/L
VITAMIN B12: 314 PG/ML (ref 200–1100)
VITAMIN D, 25-OH, TOTAL: 12 NG/ML (ref 30–100)
WHITE BLOOD CELL COUNT: 7.1 THOUSAND/UL (ref 3.8–10.8)

## 2022-10-04 ENCOUNTER — OFFICE VISIT (OUTPATIENT)
Dept: FAMILY MEDICINE CLINIC | Facility: CLINIC | Age: 57
End: 2022-10-04
Payer: COMMERCIAL

## 2022-10-04 VITALS
TEMPERATURE: 97 F | HEIGHT: 65 IN | HEART RATE: 55 BPM | WEIGHT: 230 LBS | DIASTOLIC BLOOD PRESSURE: 77 MMHG | BODY MASS INDEX: 38.32 KG/M2 | SYSTOLIC BLOOD PRESSURE: 123 MMHG

## 2022-10-04 DIAGNOSIS — K44.9 HIATAL HERNIA WITHOUT GANGRENE OR OBSTRUCTION: ICD-10-CM

## 2022-10-04 DIAGNOSIS — R53.83 FATIGUE, UNSPECIFIED TYPE: ICD-10-CM

## 2022-10-04 DIAGNOSIS — E55.9 VITAMIN D DEFICIENCY: ICD-10-CM

## 2022-10-04 DIAGNOSIS — R79.89 ELEVATED LFTS: ICD-10-CM

## 2022-10-04 DIAGNOSIS — Z12.31 ENCOUNTER FOR SCREENING MAMMOGRAM FOR BREAST CANCER: ICD-10-CM

## 2022-10-04 DIAGNOSIS — K76.0 FATTY LIVER: ICD-10-CM

## 2022-10-04 DIAGNOSIS — R10.10 UPPER ABDOMINAL PAIN: Primary | ICD-10-CM

## 2022-10-04 DIAGNOSIS — E03.9 HYPOTHYROIDISM, UNSPECIFIED TYPE: ICD-10-CM

## 2022-10-04 PROCEDURE — 3078F DIAST BP <80 MM HG: CPT | Performed by: FAMILY MEDICINE

## 2022-10-04 PROCEDURE — 99214 OFFICE O/P EST MOD 30 MIN: CPT | Performed by: FAMILY MEDICINE

## 2022-10-04 PROCEDURE — 3074F SYST BP LT 130 MM HG: CPT | Performed by: FAMILY MEDICINE

## 2022-10-04 PROCEDURE — 3008F BODY MASS INDEX DOCD: CPT | Performed by: FAMILY MEDICINE

## 2022-10-04 RX ORDER — ERGOCALCIFEROL 1.25 MG/1
50000 CAPSULE ORAL WEEKLY
Qty: 12 CAPSULE | Refills: 3 | Status: SHIPPED | OUTPATIENT
Start: 2022-10-04 | End: 2022-11-03

## 2022-10-04 RX ORDER — LEVOTHYROXINE SODIUM 0.03 MG/1
25 TABLET ORAL
Qty: 90 TABLET | Refills: 0 | Status: SHIPPED | OUTPATIENT
Start: 2022-10-04

## 2022-10-12 DIAGNOSIS — R10.10 UPPER ABDOMINAL PAIN: ICD-10-CM

## 2022-10-13 RX ORDER — OMEPRAZOLE 20 MG/1
20 CAPSULE, DELAYED RELEASE ORAL
Qty: 30 CAPSULE | Refills: 5 | Status: SHIPPED | OUTPATIENT
Start: 2022-10-13

## 2022-10-13 NOTE — TELEPHONE ENCOUNTER
Refill passed per [facility] protocol.     Requested Prescriptions   Pending Prescriptions Disp Refills    OMEPRAZOLE 20 MG Oral Capsule Delayed Release [Pharmacy Med Name: OMEPRAZOLE DR 20 MG CAPSULE] 30 capsule 0     Sig: TAKE 1 CAPSULE BY MOUTH EVERY DAY IN THE MORNING BEFORE BREAKFAST        Gastrointestional Medication Protocol Passed - 10/12/2022 12:32 PM        Passed - In person appointment or virtual visit in the past 12 mos or appointment in next 3 mos       Recent Outpatient Visits              1 week ago Upper abdominal pain    [facility], [address]    Office Visit    3 weeks ago Carpal tunnel syndrome, bilateral upper limbs    [facility] Tucker Rubalcava DO    Office Visit    3 weeks ago Upper abdominal pain    [facility], [address]    Office Visit    1 month ago Carpal tunnel syndrome, bilateral upper limbs    [facility] Tucker Rubalcava DO    Office Visit    3 months ago Left lower quadrant abdominal pain    [facility], [address] MD    Telemedicine     Future Appointments         Provider Department Appt Notes    In 1 week Tucker Rubalcava, [facility] f/u 4 weeks    In 4 weeks [facility] [address] Outpatient Visits              1 week ago Upper abdominal pain    [facility], [address]    Office Visit    3 weeks ago Carpal tunnel syndrome, bilateral upper limbs    [facility] uTcker Rubalcava DO    Office Visit    3 weeks ago Upper abdominal pain    Julius Walden MD    Office Visit    1 month ago Carpal tunnel syndrome, bilateral upper limbs    [address] [facility] [place], [place]    Office Visit    3 months ago Left lower quadrant abdominal pain    [facility], [address] [facility] Johana Fuentes MD    Telemedicine            Future Appointments         Provider Department Appt Notes    In 1 week [facility], [facility] 4 weeks    In 4 weeks [address] Mamm[facility] N/A

## 2022-11-11 ENCOUNTER — HOSPITAL ENCOUNTER (OUTPATIENT)
Dept: MAMMOGRAPHY | Facility: HOSPITAL | Age: 57
Discharge: HOME | End: 2022-11-11
Attending: FAMILY MEDICINE
Payer: COMMERCIAL

## 2022-11-11 DIAGNOSIS — Z12.31 ENCOUNTER FOR SCREENING MAMMOGRAM FOR BREAST CANCER: ICD-10-CM

## 2022-11-11 PROCEDURE — 77067 SCR MAMMO BI INCL CAD: CPT | Performed by: FAMILY MEDICINE

## 2022-11-11 PROCEDURE — 77063 BREAST TOMOSYNTHESIS BI: CPT | Performed by: FAMILY MEDICINE

## 2022-11-13 ENCOUNTER — HOSPITAL ENCOUNTER (EMERGENCY)
Facility: HOSPITAL | Age: 57
Discharge: HOME | End: 2022-11-13
Payer: COMMERCIAL

## 2022-11-13 VITALS
OXYGEN SATURATION: 97 % | BODY MASS INDEX: 37.99 KG/M2 | RESPIRATION RATE: 16 BRPM | HEART RATE: 67 BPM | TEMPERATURE: 98 F | HEIGHT: 65 IN | DIASTOLIC BLOOD PRESSURE: 85 MMHG | WEIGHT: 228 LBS | SYSTOLIC BLOOD PRESSURE: 128 MMHG
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
## 2022-11-13 DIAGNOSIS — S16.1XXA STRAIN OF NECK MUSCLE, INITIAL ENCOUNTER: ICD-10-CM

## 2022-11-13 PROCEDURE — 99283 EMERGENCY DEPT VISIT LOW MDM: CPT

## 2022-11-13 RX ORDER — CYCLOBENZAPRINE HCL 10 MG
10 TABLET ORAL 3 TIMES DAILY PRN
Qty: 20 TABLET | Refills: 0 | Status: SHIPPED | OUTPATIENT
Start: 2022-11-13 | End: 2022-11-20

## 2022-11-13 RX ORDER — CYCLOBENZAPRINE HCL 10 MG
10 TABLET ORAL ONCE
Status: COMPLETED | OUTPATIENT
Start: 2022-11-13 | End: 2022-11-13

## 2022-11-13 RX ORDER — IBUPROFEN 600 MG/1
600 TABLET ORAL EVERY 8 HOURS PRN
Qty: 15 TABLET | Refills: 0 | Status: SHIPPED | OUTPATIENT
Start: 2022-11-13 | End: 2022-11-20

## 2022-11-13 RX ORDER — IBUPROFEN 600 MG/1
600 TABLET ORAL ONCE
Status: COMPLETED | OUTPATIENT
Start: 2022-11-13 | End: 2022-11-13
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
## 2022-11-26 ENCOUNTER — APPOINTMENT (OUTPATIENT)
Dept: CT IMAGING | Facility: HOSPITAL | Age: 57
End: 2022-11-26
Attending: STUDENT IN AN ORGANIZED HEALTH CARE EDUCATION/TRAINING PROGRAM
Payer: COMMERCIAL

## 2022-11-26 ENCOUNTER — HOSPITAL ENCOUNTER (EMERGENCY)
Facility: HOSPITAL | Age: 57
Discharge: HOME | End: 2022-11-26
Attending: STUDENT IN AN ORGANIZED HEALTH CARE EDUCATION/TRAINING PROGRAM
Payer: COMMERCIAL

## 2022-11-26 ENCOUNTER — APPOINTMENT (OUTPATIENT)
Dept: GENERAL RADIOLOGY | Facility: HOSPITAL | Age: 57
End: 2022-11-26
Attending: STUDENT IN AN ORGANIZED HEALTH CARE EDUCATION/TRAINING PROGRAM
Payer: COMMERCIAL

## 2022-11-26 VITALS
DIASTOLIC BLOOD PRESSURE: 83 MMHG | HEIGHT: 65 IN | TEMPERATURE: 98 F | WEIGHT: 225 LBS | HEART RATE: 59 BPM | RESPIRATION RATE: 16 BRPM | OXYGEN SATURATION: 94 % | SYSTOLIC BLOOD PRESSURE: 147 MMHG | BODY MASS INDEX: 37.49 KG/M2

## 2022-11-26 DIAGNOSIS — G44.209 TENSION HEADACHE: Primary | ICD-10-CM

## 2022-11-26 DIAGNOSIS — M62.838 SPASM OF MUSCLE: ICD-10-CM

## 2022-11-26 PROCEDURE — 99284 EMERGENCY DEPT VISIT MOD MDM: CPT

## 2022-11-26 PROCEDURE — 70450 CT HEAD/BRAIN W/O DYE: CPT | Performed by: STUDENT IN AN ORGANIZED HEALTH CARE EDUCATION/TRAINING PROGRAM

## 2022-11-26 PROCEDURE — 96372 THER/PROPH/DIAG INJ SC/IM: CPT

## 2022-11-26 PROCEDURE — 72040 X-RAY EXAM NECK SPINE 2-3 VW: CPT | Performed by: STUDENT IN AN ORGANIZED HEALTH CARE EDUCATION/TRAINING PROGRAM

## 2022-11-26 RX ORDER — KETOROLAC TROMETHAMINE 15 MG/ML
15 INJECTION, SOLUTION INTRAMUSCULAR; INTRAVENOUS ONCE
Status: COMPLETED | OUTPATIENT
Start: 2022-11-26 | End: 2022-11-26

## 2022-11-26 RX ORDER — CYCLOBENZAPRINE HCL 10 MG
10 TABLET ORAL 3 TIMES DAILY PRN
Status: DISCONTINUED | OUTPATIENT
Start: 2022-11-26 | End: 2022-11-27

## 2022-11-26 RX ORDER — CYCLOBENZAPRINE HCL 10 MG
10 TABLET ORAL NIGHTLY
Qty: 20 TABLET | Refills: 0 | Status: SHIPPED | OUTPATIENT
Start: 2022-11-26 | End: 2022-12-03
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
## 2022-12-05 RX ORDER — PREGABALIN 150 MG/1
150 CAPSULE ORAL 2 TIMES DAILY
Qty: 180 CAPSULE | Refills: 1 | OUTPATIENT
Start: 2022-12-05

## 2022-12-05 NOTE — TELEPHONE ENCOUNTER
Refill Request    Medication request: PREGABALIN 150 MG CAPSULE  TAKE 1 CAPSULE BY MOUTH TWICE A DAY    LOV:9/22/22 Marine Hayden DO (patient had appointment on 10/20/22 as NO SHOW)  Due back to clinic per last office note:  \"follow-up with me in 4 weeks. \"  NOV: Visit date not found      [place]: 9/1/2022 #180 (1 refill)    Urine drug screen (if applicable): None. Pain contract: None    LOV plan (if weaning or changing medications): No mention of this medication. Valid refill on file from previous refill. Denied. No further action needed.

## 2022-12-09 ENCOUNTER — HOSPITAL ENCOUNTER (EMERGENCY)
Facility: HOSPITAL | Age: 57
Discharge: HOME | End: 2022-12-09
Attending: EMERGENCY MEDICINE
Payer: COMMERCIAL

## 2022-12-09 VITALS
OXYGEN SATURATION: 98 % | HEIGHT: 65 IN | DIASTOLIC BLOOD PRESSURE: 88 MMHG | TEMPERATURE: 98 F | RESPIRATION RATE: 16 BRPM | SYSTOLIC BLOOD PRESSURE: 152 MMHG | WEIGHT: 230 LBS | HEART RATE: 75 BPM | BODY MASS INDEX: 38.32 KG/M2

## 2022-12-09 DIAGNOSIS — M54.50 ACUTE LOW BACK PAIN WITHOUT SCIATICA, UNSPECIFIED BACK PAIN LATERALITY: Primary | ICD-10-CM

## 2022-12-09 PROCEDURE — 99283 EMERGENCY DEPT VISIT LOW MDM: CPT

## 2022-12-09 RX ORDER — NAPROXEN 500 MG/1
500 TABLET ORAL 2 TIMES DAILY PRN
Qty: 20 TABLET | Refills: 0 | Status: SHIPPED | OUTPATIENT
Start: 2022-12-09 | End: 2022-12-16

## 2022-12-09 RX ORDER — NAPROXEN 500 MG/1
500 TABLET ORAL ONCE
Status: COMPLETED | OUTPATIENT
Start: 2022-12-09 | End: 2022-12-09

## 2022-12-09 RX ORDER — LIDOCAINE 50 MG/G
1 PATCH TOPICAL EVERY 24 HOURS
Qty: 10 PATCH | Refills: 0 | Status: SHIPPED | OUTPATIENT
Start: 2022-12-09 | End: 2022-12-19

## 2022-12-09 NOTE — ED INITIAL ASSESSMENT (HPI)
S: Low back pain since Wednesday denies specific trauma. Hx of this back pain prior, pt is aware of a herniated disc in her cervical spine.    B: DM (unmedicated), thyroid

## 2022-12-10 NOTE — ED QUICK NOTES
Pt c/o of lower back pain since Wednesday describes as \"cramping, spasm\" does not recall trauma reports worse with movement denies numbness/tingling. Cms intact, pt reports she had similar pain in the past but was years ago, pt took ibuprofen with no relive and \"muscle medication\", pt resting on cart no distress noted, call light within reach.

## 2022-12-10 NOTE — DISCHARGE INSTRUCTIONS
Take naproxen every 12 hours. You had a dose in the emergency department tonight. Your next dose is due tomorrow morning at home. Using lidocaine patches prescribed to you today as directed. When not using lidocaine patches you can use ice or heat to help with pain. See your primary care doctor in the next week for follow-up. Return to the ER if you develop worsening symptoms, incontinence, fever, or any emergent concerns.

## 2023-02-15 ENCOUNTER — OFFICE VISIT (OUTPATIENT)
Dept: FAMILY MEDICINE CLINIC | Facility: CLINIC | Age: 58
End: 2023-02-15

## 2023-02-15 VITALS
WEIGHT: 231 LBS | TEMPERATURE: 98 F | HEART RATE: 81 BPM | BODY MASS INDEX: 38.49 KG/M2 | HEIGHT: 65 IN | DIASTOLIC BLOOD PRESSURE: 82 MMHG | SYSTOLIC BLOOD PRESSURE: 121 MMHG | OXYGEN SATURATION: 98 %

## 2023-02-15 DIAGNOSIS — H66.93 ACUTE OTITIS MEDIA, BILATERAL: ICD-10-CM

## 2023-02-15 DIAGNOSIS — R05.1 ACUTE COUGH: ICD-10-CM

## 2023-02-15 DIAGNOSIS — R09.81 NASAL CONGESTION: ICD-10-CM

## 2023-02-15 DIAGNOSIS — J02.9 SORE THROAT: Primary | ICD-10-CM

## 2023-02-15 LAB
COVID19 BINAX NOW ANTIGEN: NOT DETECTED
OPERATOR ID: NORMAL
POCT LOT NUMBER: NORMAL

## 2023-02-15 PROCEDURE — 3079F DIAST BP 80-89 MM HG: CPT | Performed by: FAMILY MEDICINE

## 2023-02-15 PROCEDURE — 3074F SYST BP LT 130 MM HG: CPT | Performed by: FAMILY MEDICINE

## 2023-02-15 PROCEDURE — 99214 OFFICE O/P EST MOD 30 MIN: CPT | Performed by: FAMILY MEDICINE

## 2023-02-15 PROCEDURE — 3008F BODY MASS INDEX DOCD: CPT | Performed by: FAMILY MEDICINE

## 2023-02-15 RX ORDER — AMOXICILLIN 875 MG/1
875 TABLET, COATED ORAL 2 TIMES DAILY
Qty: 20 TABLET | Refills: 0 | Status: SHIPPED | OUTPATIENT
Start: 2023-02-15 | End: 2023-02-25

## 2023-02-15 RX ORDER — ERGOCALCIFEROL 1.25 MG/1
50000 CAPSULE ORAL WEEKLY
COMMUNITY
Start: 2022-12-31

## 2023-03-27 ENCOUNTER — TELEPHONE (OUTPATIENT)
Dept: PHYSICAL MEDICINE AND REHAB | Facility: CLINIC | Age: 58
End: 2023-03-27

## 2023-03-27 ENCOUNTER — OFFICE VISIT (OUTPATIENT)
Dept: PHYSICAL MEDICINE AND REHAB | Facility: CLINIC | Age: 58
End: 2023-03-27
Payer: COMMERCIAL

## 2023-03-27 VITALS
WEIGHT: 230 LBS | OXYGEN SATURATION: 98 % | BODY MASS INDEX: 38.32 KG/M2 | SYSTOLIC BLOOD PRESSURE: 124 MMHG | DIASTOLIC BLOOD PRESSURE: 88 MMHG | HEART RATE: 77 BPM | HEIGHT: 65 IN

## 2023-03-27 DIAGNOSIS — G56.03 CARPAL TUNNEL SYNDROME, BILATERAL UPPER LIMBS: Primary | ICD-10-CM

## 2023-03-27 PROCEDURE — 3074F SYST BP LT 130 MM HG: CPT | Performed by: PHYSICAL MEDICINE & REHABILITATION

## 2023-03-27 PROCEDURE — 3008F BODY MASS INDEX DOCD: CPT | Performed by: PHYSICAL MEDICINE & REHABILITATION

## 2023-03-27 PROCEDURE — 3079F DIAST BP 80-89 MM HG: CPT | Performed by: PHYSICAL MEDICINE & REHABILITATION

## 2023-03-27 PROCEDURE — 76942 ECHO GUIDE FOR BIOPSY: CPT | Performed by: PHYSICAL MEDICINE & REHABILITATION

## 2023-03-27 PROCEDURE — 99214 OFFICE O/P EST MOD 30 MIN: CPT | Performed by: PHYSICAL MEDICINE & REHABILITATION

## 2023-03-27 PROCEDURE — 20526 THER INJECTION CARP TUNNEL: CPT | Performed by: PHYSICAL MEDICINE & REHABILITATION

## 2023-03-27 RX ORDER — PREGABALIN 150 MG/1
150 CAPSULE ORAL 2 TIMES DAILY
Qty: 180 CAPSULE | Refills: 1 | Status: SHIPPED | OUTPATIENT
Start: 2023-03-27

## 2023-03-27 RX ORDER — TRIAMCINOLONE ACETONIDE 40 MG/ML
40 INJECTION, SUSPENSION INTRA-ARTICULAR; INTRAMUSCULAR ONCE
Status: COMPLETED | OUTPATIENT
Start: 2023-03-27 | End: 2023-03-27

## 2023-03-27 RX ORDER — LIDOCAINE HYDROCHLORIDE 10 MG/ML
1 INJECTION, SOLUTION INFILTRATION; PERINEURAL ONCE
Status: COMPLETED | OUTPATIENT
Start: 2023-03-27 | End: 2023-03-27

## 2023-03-27 RX ADMIN — TRIAMCINOLONE ACETONIDE 40 MG: 40 INJECTION, SUSPENSION INTRA-ARTICULAR; INTRAMUSCULAR at 16:11:00

## 2023-03-27 RX ADMIN — LIDOCAINE HYDROCHLORIDE 1 ML: 10 INJECTION, SOLUTION INFILTRATION; PERINEURAL at 16:10:00

## 2023-03-27 NOTE — PATIENT INSTRUCTIONS
-Start Lyrica 150mg twice daily    Steroid Injection Information  What to expect: The injection contains Lidocaine (which numbs the area) and Kenalog (a steroid which decreases inflammation). You may have pain relief within hours of the injection due to the Lidocaine. The Kenalog can take a couple days, up to a couple weeks, to reach the full effect. It is also possible to have a slight increase in symptoms over the first few days, but that should resolve fairly quickly. How long will the injection last?: The length of response to an injection is variable. Literally a couple weeks to a couple years. The injection will decrease the inflammation and the pain will return if/when the inflammation returns. Activity Recommendations: For the first 24 hours after injection, keep the area clean and dry. It is ok to shower but don't soak in a tub during that time. No vigorous activity such as running or heavy lifting for the first week but other than that you can gradually resume your normal activities immediately. If you have a significant decrease in pain, be careful not to do too much too soon. Again, the key is GRADUAL resumption of activites. Things to look out for: Common injection side effects include soreness at the injection site, bruising, flushing of the face or skin, and a temporary increase in your blood sugars and/or blood pressure. Infection is very rare but please notify my office [facility] for [address] [phone]) if you develop any fevers, drainage from the injection site, or severe increase in pain. If it is the weekend, go to an Emergency Room.       4 weeks

## 2023-03-27 NOTE — TELEPHONE ENCOUNTER
Initiated authorization for  Ultrasound guided right carpal tunnel injection with corticosteroid CPT 83823, , [id number] with [employer]  Case #[id number]  Status: Approved-authorization is not required per health plan      inj done in office

## 2023-03-28 NOTE — PROCEDURES
Procedure:  Ultrasound guided RIGHT carpal tunnel injection  The risks, benefits and anticipated outcomes of the procedure, the risks and benefits of the alternatives to the procedure, and the roles and tasks of the personnel to be involved, were discussed with the patient, and the patient consents to the procedure and agrees to proceed. UNIVERSAL PROTOCOL / SAFETY CHECKLIST  Sign in Communication: Completed  Time Out:  Team Confirms the Correct Patient, Correct Procedure, Correct Site and Site Marking, Correct Position (if applicable), Prep and Dry Time (if applicable). Affirmation of Time Out: YES  Sign Out Discussion: Completed if applicable    The procedure was carried out under sterile prep  with sterile gel. The target site was anesthetized with a topical ethyl chloride spray. Then, A  25ga 1.5in needle was introduced and advanced with ultrasound guidance from radial to ulnar direction using an in plane approach with the transducer in transverse orientation. Following negative aspiration, a mixture of 1cc of 1% lidocaine and 1 cc of Kenalog (40mg/ml) was injected. Permanent pictures were taken and stored in the PACS system. Ultrasound interpretation was performed prior to the procedure to identify the target and any adjacent neurovascular structures, and during real-time needle guidance confirming placement. Post-intervention interpretation was also performed confirming appropriate injectate flow and hemostasis. The patient tolerated the procedure without complication and was instructed in post-procedure precautions. Please see patient instructions.     Wesley Swain DO, FAAPMR & CAQSM  Physical Medicine and Rehabilitation/Sports Medicine  MEDICAL CENTER [facility]

## 2023-03-30 ENCOUNTER — TELEPHONE (OUTPATIENT)
Dept: PHYSICAL MEDICINE AND REHAB | Facility: CLINIC | Age: 58
End: 2023-03-30

## 2023-06-30 RX ORDER — PREGABALIN 150 MG/1
150 CAPSULE ORAL 2 TIMES DAILY
Qty: 180 CAPSULE | Refills: 1 | Status: SHIPPED | OUTPATIENT
Start: 2023-06-30

## 2023-07-26 ENCOUNTER — OFFICE VISIT (OUTPATIENT)
Dept: PHYSICAL MEDICINE AND REHAB | Facility: CLINIC | Age: 58
End: 2023-07-26
Payer: COMMERCIAL

## 2023-07-26 VITALS — HEART RATE: 81 BPM | DIASTOLIC BLOOD PRESSURE: 86 MMHG | OXYGEN SATURATION: 97 % | SYSTOLIC BLOOD PRESSURE: 126 MMHG

## 2023-07-26 DIAGNOSIS — G56.03 CARPAL TUNNEL SYNDROME, BILATERAL UPPER LIMBS: Primary | ICD-10-CM

## 2023-07-26 DIAGNOSIS — Z98.890 S/P BILATERAL CARPAL TUNNEL RELEASE: ICD-10-CM

## 2023-07-26 PROCEDURE — 3079F DIAST BP 80-89 MM HG: CPT | Performed by: PHYSICAL MEDICINE & REHABILITATION

## 2023-07-26 PROCEDURE — 99214 OFFICE O/P EST MOD 30 MIN: CPT | Performed by: PHYSICAL MEDICINE & REHABILITATION

## 2023-07-26 PROCEDURE — 3074F SYST BP LT 130 MM HG: CPT | Performed by: PHYSICAL MEDICINE & REHABILITATION

## 2023-07-26 RX ORDER — LIDOCAINE HYDROCHLORIDE 10 MG/ML
1 INJECTION, SOLUTION INFILTRATION; PERINEURAL ONCE
Status: CANCELLED | OUTPATIENT
Start: 2023-07-26 | End: 2023-07-26

## 2023-07-26 RX ORDER — TRIAMCINOLONE ACETONIDE 40 MG/ML
40 INJECTION, SUSPENSION INTRA-ARTICULAR; INTRAMUSCULAR ONCE
Status: CANCELLED | OUTPATIENT
Start: 2023-07-26 | End: 2023-07-26

## 2023-07-26 NOTE — PATIENT INSTRUCTIONS
-Start occupational therapy and home exercises  -Carpal tunnel splints during the day  -Voltaren gel 3-4 daily   -Follow up in 4-6 weeks

## 2023-07-31 NOTE — PROGRESS NOTES
[address]  OFFICE FOLLOW UP EVALUATION      HISTORY OF PRESENT ILLNESS:   Patient presents with: Follow - Up: Pt returning for follow up after 3/27/23 Right carpal tunnel injection. Reports 50% relief that lasted 2 months. Now, back to baseline. Patient continues with bilateral pain to wrist/hands, R>L. Describes as Bryce Preethi and stabbing\". CPL 7/10. Continues medication as directed. Would like to discuss injection today. Patient is following up for bilateral hand pain. She states from the last injection cc noted 50% improvement overall. The pain has improved for 2 months. Now the pain is back to baseline. She continues to have bilateral pain with hands numbness tingling sensation occasionally sharp and stabbing in nature. Rates her pain to be 7 out of 10. She has not any dedicated treatment to terms of therapy recently. She is wondering if she should have repeat injection. PHYSICAL EXAM:   /86   Pulse 81   LMP 02/11/2020   SpO2 97%     Wrist range of motion is normal.  Positive Tinel's at the wrist bilaterally    IMAGING:     No new imaging    All imaging results were reviewed and discussed with patient. ASSESSMENT/PLAN:     1. Carpal tunnel syndrome, bilateral upper limbs    2. S/p bilateral carpal tunnel release        Virgil De is a 56year old female following up for bilateral carpal tunnel symptoms. She has history of bilateral carpal tunnel release. The pain continues to be present despite the injection. I advised her to start occupational therapy program today to see if we can further improve her symptoms overall. Recommend Voltaren gel 3-5 times daily and following up in 4 to 6 weeks. Advised her to wear carpal tunnel splints during the day when she is more active to prevent flareups.   If no better we can consider possible repeat injection versus orthopedic consultation for carpal tunnel release although patient is hesitant to pursue at this treatment plan at this time. The patient verbalized understanding with the plan and was in agreement. All questions/concerns were addressed and there were no barriers to learning. Please note Dragon dictation software was used to dictate this note and may result in inadvertent typos. Chito Arizmendi DO, FAAPMR & CAQSM  Physical Medicine and Rehabilitation  Sports and Spine Medicine    PAST MEDICAL HISTORY:     Past Medical History:   Diagnosis Date    Carpal tunnel syndrome of left wrist 2021    Left Endoscopic Carpal Tunnel Release     Carpal tunnel syndrome on right 2020    Chalazion left upper eyelid 2022    Diabetes ([address]     History of blood transfusion     @ [facility] over 20 yrs; no reactions    Osteoarthritis     Problems with swallowing     upper dentures    Thyroid disease     Visual impairment     wears glases         PAST SURGICAL HISTORY:     Past Surgical History:   Procedure Laterality Date          WRIST ARTHROSCOP,RELEASE XVERS LIG Right 01/15/2021    right endoscopic carpal tunnel release     WRIST ARTHROSCOP,RELEASE XVERS LIG Left 2021    Left Endoscopic Carpal Tunnel Release          CURRENT MEDICATIONS:     Current Outpatient Medications   Medication Sig Dispense Refill    pregabalin (LYRICA) 150 MG Oral Cap Take 1 capsule (150 mg total) by mouth 2 (two) times daily. 180 capsule 1    ergocalciferol 1.25 MG (77014 UT) Oral Cap Take 1 capsule (50,000 Units total) by mouth once a week.       omeprazole 20 MG Oral Capsule Delayed Release Take 1 capsule (20 mg total) by mouth before breakfast. 30 capsule 5    levothyroxine 25 MCG Oral Tab Take 1 tablet (25 mcg total) by mouth before breakfast. 90 tablet 0    Lancets (ONETOUCH DELICA PLUS [id number]) Does not apply Misc            ALLERGIES:   No Known Allergies      FAMILY HISTORY:     Family History   Problem Relation Age of Onset    Breast Cancer Mother 61    Heart Disorder Father Cancer Maternal Grandmother 61        unknown primary    Cancer Brother 48        throat/lung ca (smoker)    Diabetes Brother     Breast Cancer Maternal Cousin Female 45    Cancer Maternal Uncle 61        colon ca    Breast Cancer Paternal Cousin Female 46    Macular degeneration Neg     Glaucoma Neg           SOCIAL HISTORY:     Social History     Socioeconomic History    Marital status: Single   Tobacco Use    Smoking status: Never    Smokeless tobacco: Never   Vaping Use    Vaping Use: Never used   Substance and Sexual Activity    Alcohol use: Never    Drug use: Never   Other Topics Concern    Right Handed Yes          REVIEW OF SYSTEMS:   A comprehensive 10 point review of systems was completed. Pertinent positives and negatives noted in the the HPI.       LABS:     Lab Results   Component Value Date    A1C 6.0 (H) 09/23/2022     Lab Results   Component Value Date    WBC 7.1 09/23/2022    RBC 5.01 09/23/2022    HGB 15.4 09/23/2022    HCT 44.1 09/23/2022    MCV 88.0 09/23/2022    MCH 30.7 09/23/2022    MCHC 34.9 09/23/2022    RDW 12.7 09/23/2022     09/23/2022    MPV 6.9 (L) 11/26/2018     Lab Results   Component Value Date     (H) 09/23/2022    BUN 9 09/23/2022    BUNCREA NOT APPLICABLE 37/94/8225    CREATSERUM 0.66 09/23/2022    ANIONGAP 7 02/01/2022    GFRNAA 99 02/01/2022    GFRAA 114 02/01/2022    CA 9.6 09/23/2022    OSMOCALC 296 (H) 02/01/2022    ALKPHO 80 09/23/2022    AST 48 (H) 09/23/2022    ALT 41 (H) 09/23/2022    BILT 0.7 09/23/2022    TP 7.1 09/23/2022    ALB 4.2 09/23/2022    GLOBULIN 2.9 09/23/2022    AGRATIO 1.4 09/23/2022     09/23/2022    K 4.1 09/23/2022     09/23/2022    CO2 26 09/23/2022     No results found for: PTP, PT, INR  Lab Results   Component Value Date    VITD 12 (L) 09/23/2022

## 2023-08-08 ENCOUNTER — TELEPHONE (OUTPATIENT)
Dept: PHYSICAL THERAPY | Facility: HOSPITAL | Age: 58
End: 2023-08-08

## 2023-08-11 ENCOUNTER — APPOINTMENT (OUTPATIENT)
Dept: OCCUPATIONAL MEDICINE | Facility: HOSPITAL | Age: 58
End: 2023-08-11
Attending: PHYSICAL MEDICINE & REHABILITATION
Payer: COMMERCIAL

## 2023-08-18 ENCOUNTER — OFFICE VISIT (OUTPATIENT)
Dept: OCCUPATIONAL MEDICINE | Facility: HOSPITAL | Age: 58
End: 2023-08-18
Attending: PHYSICAL MEDICINE & REHABILITATION
Payer: COMMERCIAL

## 2023-08-18 PROCEDURE — 97166 OT EVAL MOD COMPLEX 45 MIN: CPT

## 2023-08-18 PROCEDURE — 97110 THERAPEUTIC EXERCISES: CPT

## 2023-08-25 ENCOUNTER — OFFICE VISIT (OUTPATIENT)
Dept: OCCUPATIONAL MEDICINE | Facility: HOSPITAL | Age: 58
End: 2023-08-25
Attending: PHYSICAL MEDICINE & REHABILITATION
Payer: COMMERCIAL

## 2023-08-25 PROCEDURE — 97140 MANUAL THERAPY 1/> REGIONS: CPT

## 2023-08-25 PROCEDURE — 97110 THERAPEUTIC EXERCISES: CPT

## 2023-08-25 PROCEDURE — 97035 APP MDLTY 1+ULTRASOUND EA 15: CPT

## 2023-08-25 NOTE — PROGRESS NOTES
Diagnosis:   Carpal tunnel syndrome, bilateral upper limbs (G56.03)  S/p bilateral carpal tunnel release (L61.360)      Referring Provider: Aida Friend  Date of Evaluation:    8/18/2023     Precautions:  None Next MD visit:   none scheduled  Date of Surgery: n/a   Insurance Primary/Secondary: [employer]        # Auth Visits: 2/8            Subjective: Patient presents to OT appt today noting she has been completing HEP exercises and feels 7/10 aching pain to R hand and wrist alongside continued median nerve numbness/tingling. Patient notes she hasn't been able to wear wrist cock-up brace at work as she needs to use her hands a lot but has been wearing at nighttime. Objective:     ORTHOTICS: Patient notes she has not utilized any orthotics for carpal tunnel syndrome management. Therapist assisted today with fabrication of RUE custom orthoplast wrist cock-up brace. Patient instructed on splint care and wear schedule. Patient to wear at nighttime and during activity, to remove for exercises and hygiene. LUE splint to be made in future sessions as appropriate. SENSORY: Patient notes constant numbness/tingling symptoms to bilateral median nerve distribution of hand and digits R>L. AROM (degrees): Patient BUE AROM WFL for shoulders, elbows, wrists, and digits. Wrist   Flexion: R 75, L 70  Extension: R 60, L 60  Ulnar Deviation: R 25, L 25  Radial Deviation R 10, L 10        Assessment: Patient progressing with therapy goals at this time with focus on carpal tunnel syndrome management. Patient instructed on activity modifications, use of bracing, modalities, ROM stretches, and course of recovery including avoiding strain to wrist and digit flexors. Patient to continue with current recommendations at this time. Goals: (to be met in 8 visits)   1. Patient will be independent with activity modifications and recommendations indicating increased functional use of hands and decreased affected symptoms.   2. Patient will be independent and complaint with splint wearing schedule to maintain progress achieved in OT. 3. Patient will report pain no greater than 2/10 indicating increased bilateral management of carpal tunnel syndrome symptoms. 4. Patient will be independent and compliant with comprehensive HEP to maintain progress achieved in OT. *additional goals to be made as appropriate     Plan: Patient will continue to be seen for 2x/week or a total of 8 visits over a 90 day period. Treatment will include: Manual Therapy, Neuromuscular Re-education, Self-Care Home Management, Therapeutic Activities, Therapeutic Exercise, and Home Exercise Program instruction, Splinting, Modalities PRN    Date: 8/25/2023  TX#: 2/8 Date:                 TX#: 3/8 Date:                 TX#: 4/8 Date:                 TX#: 5/8 Date:    Tx#: 6/8   -Ultrasound treatment to R carpal tunnel area w/ settings 3.3 MHz, 0.6 W/cm2, 50% duty for 8 min  -IASTM/STM to R carpal tunnel area  -Joint Mobilization for R wrist and digits  -PROM stretching of R wrist and digits  -Tendon Gliding Exercises and AROM Wrist Exercises  -10 min ice pack to R hand         HEP:   -Tendon Gliding Exercises (1 set of 5 reps each, 5 sec hold, 2-3x/day)  -Wrist AROM Exercises (1 set of 5 reps each, 5 sec hold, 2-3x/day)    Charges: USx1, TEx1, MTx1       Total Timed Treatment: 45 min  Total Treatment Time: 45 min

## 2023-08-28 ENCOUNTER — APPOINTMENT (OUTPATIENT)
Dept: OCCUPATIONAL MEDICINE | Facility: HOSPITAL | Age: 58
End: 2023-08-28
Attending: PHYSICAL MEDICINE & REHABILITATION
Payer: COMMERCIAL

## 2023-08-28 PROCEDURE — 97110 THERAPEUTIC EXERCISES: CPT

## 2023-08-28 PROCEDURE — 97140 MANUAL THERAPY 1/> REGIONS: CPT

## 2023-08-28 NOTE — PROGRESS NOTES
Diagnosis:   Carpal tunnel syndrome, bilateral upper limbs (G56.03)  S/p bilateral carpal tunnel release (S72.850)      Referring Provider: Carrington Stevens  Date of Evaluation:    8/18/2023     Precautions:  None Next MD visit:   none scheduled  Date of Surgery: [address]: [facility]        # Auth Visits: 3/8            Subjective: Patient presents to OT appt today noting that she was put on light duty work. Patient notes constant numbness/tingling and weakness symptoms to RUE overall but feeling better at 5/10 rated today. Patient notes she has been wearing wrist brace on as-needed basis. Objective:     ORTHOTICS: Patient notes she has not utilized any orthotics for carpal tunnel syndrome management. Therapist assisted today with fabrication of [place] custom orthoplast wrist cock-up brace. Patient instructed on splint care and wear schedule. Patient to wear at nighttime and during activity, to remove for exercises and hygiene. LUE splint to be made in future sessions as appropriate. SENSORY: Patient notes constant numbness/tingling symptoms to bilateral median nerve distribution of hand and digits R>L. AROM (degrees): Patient BUE AROM WFL for shoulders, elbows, wrists, and digits. Wrist   Flexion: R 75, L 70  Extension: R 60, L 60  Ulnar Deviation: R 25, L 25  Radial Deviation R 10, L 10        Assessment: Patient progressing with therapy goals at this time with focus on carpal tunnel syndrome management. Patient instructed on activity modifications, use of bracing, modalities, ROM stretches, and course of recovery including avoiding strain to wrist and digit flexors. Patient to continue with current recommendations at this time. Goals: (to be met in 8 visits)   1. Patient will be independent with activity modifications and recommendations indicating increased functional use of hands and decreased affected symptoms.   2. Patient will be independent and complaint with splint wearing schedule to maintain progress achieved in OT. 3. Patient will report pain no greater than 2/10 indicating increased bilateral management of carpal tunnel syndrome symptoms. 4. Patient will be independent and compliant with comprehensive HEP to maintain progress achieved in OT. *additional goals to be made as appropriate     Plan: Patient will continue to be seen for 2x/week or a total of 8 visits over a 90 day period. Treatment will include: Manual Therapy, Neuromuscular Re-education, Self-Care Home Management, Therapeutic Activities, Therapeutic Exercise, and Home Exercise Program instruction, Splinting, Modalities PRN    Date: 8/25/2023  TX#: 2/8 Date: 8/28/2023                TX#: 3/8 Date:                 TX#: 4/8 Date:                 TX#: 5/8 Date:    Tx#: 6/8   -Ultrasound treatment to R carpal tunnel area w/ settings 3.3 MHz, 0.6 W/cm2, 50% duty for 8 min  -IASTM/STM to R carpal tunnel area  -Joint Mobilization for R wrist and digits  -PROM stretching of R wrist and digits  -Tendon Gliding Exercises and AROM Wrist Exercises  -10 min ice pack to R hand -5 min hot pack to R hand for increased blood flow and prep for ROM  -Joint Mobilization for R wrist and digits  -PROM stretching of R wrist and digits  -Tendon Gliding Exercises and AROM Wrist Exercises  -Median Nerve Glides x5 reps each (4 glides)  -Kinesiotape Support I-Strip Button Hook to hand and wrist flexors 75% stretch        HEP:   -Tendon Gliding Exercises (1 set of 5 reps each, 5 sec hold, 2-3x/day)  -Wrist AROM Exercises (1 set of 5 reps each, 5 sec hold, 2-3x/day)    Charges: TEx1, MTx2       Total Timed Treatment: 45 min  Total Treatment Time: 45 min

## 2023-08-30 ENCOUNTER — OFFICE VISIT (OUTPATIENT)
Dept: OCCUPATIONAL MEDICINE | Facility: HOSPITAL | Age: 58
End: 2023-08-30
Attending: PHYSICAL MEDICINE & REHABILITATION
Payer: COMMERCIAL

## 2023-08-30 PROCEDURE — 97035 APP MDLTY 1+ULTRASOUND EA 15: CPT

## 2023-08-30 PROCEDURE — 97140 MANUAL THERAPY 1/> REGIONS: CPT

## 2023-09-05 RX ORDER — PREGABALIN 150 MG/1
150 CAPSULE ORAL 2 TIMES DAILY
Qty: 180 CAPSULE | Refills: 1 | OUTPATIENT
Start: 2023-09-05

## 2023-09-06 ENCOUNTER — APPOINTMENT (OUTPATIENT)
Dept: OCCUPATIONAL MEDICINE | Facility: HOSPITAL | Age: 58
End: 2023-09-06
Attending: PHYSICAL MEDICINE & REHABILITATION

## 2023-09-08 ENCOUNTER — APPOINTMENT (OUTPATIENT)
Dept: OCCUPATIONAL MEDICINE | Facility: HOSPITAL | Age: 58
End: 2023-09-08
Attending: PHYSICAL MEDICINE & REHABILITATION

## 2023-09-08 ENCOUNTER — TELEPHONE (OUTPATIENT)
Dept: OCCUPATIONAL MEDICINE | Facility: HOSPITAL | Age: 58
End: 2023-09-08

## 2023-09-18 DIAGNOSIS — E55.9 VITAMIN D DEFICIENCY, UNSPECIFIED: ICD-10-CM

## 2023-09-18 RX ORDER — ERGOCALCIFEROL 1.25 MG/1
50000 CAPSULE ORAL WEEKLY
Qty: 4 CAPSULE | Refills: 0 | Status: SHIPPED | OUTPATIENT
Start: 2023-09-18

## 2023-09-20 ENCOUNTER — APPOINTMENT (OUTPATIENT)
Dept: OCCUPATIONAL MEDICINE | Facility: HOSPITAL | Age: 58
End: 2023-09-20
Attending: PHYSICAL MEDICINE & REHABILITATION

## 2023-09-20 ENCOUNTER — TELEPHONE (OUTPATIENT)
Dept: OCCUPATIONAL MEDICINE | Facility: HOSPITAL | Age: 58
End: 2023-09-20

## 2023-10-02 RX ORDER — PREGABALIN 150 MG/1
150 CAPSULE ORAL 2 TIMES DAILY
Qty: 180 CAPSULE | Refills: 0 | Status: SHIPPED | OUTPATIENT
Start: 2023-10-02

## 2023-10-02 RX ORDER — PREGABALIN 150 MG/1
150 CAPSULE ORAL 2 TIMES DAILY
Qty: 180 CAPSULE | Refills: 1 | OUTPATIENT
Start: 2023-10-02

## 2023-10-02 NOTE — TELEPHONE ENCOUNTER
Refill Request    Medication request: pregabalin (LYRICA) 150 MG Oral Cap. Take 1 capsule (150 mg total) by mouth 2 (two) times daily. XMF:0/65/3374 Janet Méndez, DO   Due back to clinic per last office note: Follow up in 4-6 weeks   NOV: Visit date not found      [place]: 09/05/2023 #60 (30 days)    Urine drug screen (if applicable): N/A  Pain contract: N/A    LOV plan (if weaning or changing medications): not mentioned.

## 2023-10-03 ENCOUNTER — OFFICE VISIT (OUTPATIENT)
Dept: FAMILY MEDICINE CLINIC | Facility: CLINIC | Age: 58
End: 2023-10-03

## 2023-10-03 ENCOUNTER — TELEPHONE (OUTPATIENT)
Dept: FAMILY MEDICINE CLINIC | Facility: CLINIC | Age: 58
End: 2023-10-03

## 2023-10-03 VITALS
WEIGHT: 232 LBS | HEART RATE: 62 BPM | SYSTOLIC BLOOD PRESSURE: 129 MMHG | BODY MASS INDEX: 38.65 KG/M2 | DIASTOLIC BLOOD PRESSURE: 85 MMHG | HEIGHT: 65 IN

## 2023-10-03 DIAGNOSIS — E66.9 OBESITY (BMI 30-39.9): ICD-10-CM

## 2023-10-03 DIAGNOSIS — R07.9 CHEST PAIN, UNSPECIFIED TYPE: Primary | ICD-10-CM

## 2023-10-03 DIAGNOSIS — H53.8 BLURRY VISION, BILATERAL: ICD-10-CM

## 2023-10-03 DIAGNOSIS — Z01.419 ENCOUNTER FOR GYNECOLOGICAL EXAMINATION: ICD-10-CM

## 2023-10-03 DIAGNOSIS — E11.9 DIABETES MELLITUS TYPE 2 WITHOUT RETINOPATHY (HCC): ICD-10-CM

## 2023-10-03 DIAGNOSIS — E55.9 VITAMIN D DEFICIENCY: ICD-10-CM

## 2023-10-03 DIAGNOSIS — E53.8 VITAMIN B12 DEFICIENCY: ICD-10-CM

## 2023-10-03 DIAGNOSIS — Z12.11 COLON CANCER SCREENING: ICD-10-CM

## 2023-10-03 DIAGNOSIS — E03.9 HYPOTHYROIDISM, UNSPECIFIED TYPE: ICD-10-CM

## 2023-10-03 DIAGNOSIS — Z12.31 ENCOUNTER FOR SCREENING MAMMOGRAM FOR BREAST CANCER: ICD-10-CM

## 2023-10-03 DIAGNOSIS — R07.9 LEFT-SIDED CHEST PAIN: ICD-10-CM

## 2023-10-03 DIAGNOSIS — Z00.00 WELL ADULT EXAM: Primary | ICD-10-CM

## 2023-10-03 PROCEDURE — 3008F BODY MASS INDEX DOCD: CPT | Performed by: FAMILY MEDICINE

## 2023-10-03 PROCEDURE — 3079F DIAST BP 80-89 MM HG: CPT | Performed by: FAMILY MEDICINE

## 2023-10-03 PROCEDURE — 99396 PREV VISIT EST AGE 40-64: CPT | Performed by: FAMILY MEDICINE

## 2023-10-03 PROCEDURE — 3074F SYST BP LT 130 MM HG: CPT | Performed by: FAMILY MEDICINE

## 2023-10-03 PROCEDURE — 99214 OFFICE O/P EST MOD 30 MIN: CPT | Performed by: FAMILY MEDICINE

## 2023-10-04 NOTE — TELEPHONE ENCOUNTER
Patient follow-up with patient. Patient was seen in the office for physical today but during examination had mentioned left-sided chest pain on and off for the last 2 weeks. Patient was referred to emergency room for further evaluation for any underlying cardiac disease. I had called and informed the emergency room. I do not see that patient went to the ER.   Please follow-up to see if she went to a different emergency room

## 2023-10-04 NOTE — TELEPHONE ENCOUNTER
No ER visited noted in in chart or care everywhere. Left message to call back. [employer] message was sent.

## 2023-10-06 ENCOUNTER — HOSPITAL ENCOUNTER (OUTPATIENT)
Dept: GENERAL RADIOLOGY | Age: 58
Discharge: HOME | End: 2023-10-06
Attending: FAMILY MEDICINE
Payer: MEDICAID

## 2023-10-06 DIAGNOSIS — E55.9 VITAMIN D DEFICIENCY, UNSPECIFIED: ICD-10-CM

## 2023-10-06 DIAGNOSIS — R07.9 CHEST PAIN, UNSPECIFIED TYPE: ICD-10-CM

## 2023-10-06 LAB
HPV I/H RISK 1 DNA SPEC QL NAA+PROBE: NEGATIVE
LAST PAP RESULT: NORMAL

## 2023-10-06 PROCEDURE — 71046 X-RAY EXAM CHEST 2 VIEWS: CPT | Performed by: FAMILY MEDICINE

## 2023-10-06 NOTE — TELEPHONE ENCOUNTER
ergocalciferol 1.25 MG (57256 UT) Oral Cap, Take 1 capsule (50,000 Units total) by mouth once a week., Disp: 4 capsule, Rfl: 0

## 2023-10-09 DIAGNOSIS — E55.9 VITAMIN D DEFICIENCY: Primary | ICD-10-CM

## 2023-10-09 RX ORDER — ERGOCALCIFEROL 1.25 MG/1
50000 CAPSULE ORAL WEEKLY
Qty: 4 CAPSULE | Refills: 0 | Status: SHIPPED | OUTPATIENT
Start: 2023-10-09

## 2023-10-09 NOTE — TELEPHONE ENCOUNTER
Please review; no protocol  Medication pended for your review and approval.     Requested Prescriptions   Pending Prescriptions Disp Refills    ergocalciferol 1.25 MG (47031 UT) Oral Cap 12 capsule 0     Sig: Take 1 capsule (50,000 Units total) by mouth once a week.        There is no refill protocol information for this order

## 2023-10-10 ENCOUNTER — TELEPHONE (OUTPATIENT)
Dept: FAMILY MEDICINE CLINIC | Facility: CLINIC | Age: 58
End: 2023-10-10

## 2023-11-22 ENCOUNTER — TELEPHONE (OUTPATIENT)
Dept: FAMILY MEDICINE CLINIC | Facility: CLINIC | Age: 58
End: 2023-11-22

## 2023-11-22 NOTE — TELEPHONE ENCOUNTER
Reminder sent for Patient to schedule DM eye exam or fax results over if exam was completed at another facility.

## 2023-12-15 ENCOUNTER — HOSPITAL ENCOUNTER (OUTPATIENT)
Dept: MAMMOGRAPHY | Age: 58
Discharge: HOME | End: 2023-12-15
Attending: FAMILY MEDICINE
Payer: MEDICAID

## 2023-12-15 DIAGNOSIS — Z12.31 ENCOUNTER FOR SCREENING MAMMOGRAM FOR BREAST CANCER: ICD-10-CM

## 2023-12-15 PROCEDURE — 77063 BREAST TOMOSYNTHESIS BI: CPT | Performed by: FAMILY MEDICINE

## 2023-12-15 PROCEDURE — 77067 SCR MAMMO BI INCL CAD: CPT | Performed by: FAMILY MEDICINE

## 2023-12-16 DIAGNOSIS — Z91.89 INCREASED RISK OF BREAST CANCER: ICD-10-CM

## 2023-12-16 DIAGNOSIS — Z80.0 FHX: COLON CANCER: Primary | ICD-10-CM

## 2023-12-20 ENCOUNTER — TELEPHONE (OUTPATIENT)
Dept: GENETICS | Facility: HOSPITAL | Age: 58
End: 2023-12-20

## 2023-12-27 ENCOUNTER — TELEPHONE (OUTPATIENT)
Dept: GENETICS | Facility: HOSPITAL | Age: 58
End: 2023-12-27

## 2023-12-29 ENCOUNTER — TELEPHONE (OUTPATIENT)
Dept: GENETICS | Facility: HOSPITAL | Age: 58
End: 2023-12-29

## 2024-02-08 ENCOUNTER — OFFICE VISIT (OUTPATIENT)
Dept: PHYSICAL MEDICINE AND REHAB | Facility: CLINIC | Age: 59
End: 2024-02-08
Payer: MEDICAID

## 2024-02-08 VITALS
HEART RATE: 70 BPM | BODY MASS INDEX: 38.65 KG/M2 | RESPIRATION RATE: 18 BRPM | HEIGHT: 65 IN | WEIGHT: 232 LBS | OXYGEN SATURATION: 100 %

## 2024-02-08 DIAGNOSIS — Z98.890 S/P BILATERAL CARPAL TUNNEL RELEASE: ICD-10-CM

## 2024-02-08 DIAGNOSIS — G56.03 CARPAL TUNNEL SYNDROME, BILATERAL UPPER LIMBS: Primary | ICD-10-CM

## 2024-02-08 PROCEDURE — 99214 OFFICE O/P EST MOD 30 MIN: CPT | Performed by: PHYSICAL MEDICINE & REHABILITATION

## 2024-02-08 RX ORDER — PREGABALIN 150 MG/1
150 CAPSULE ORAL 2 TIMES DAILY
Qty: 180 CAPSULE | Refills: 0 | Status: SHIPPED | OUTPATIENT
Start: 2024-02-08

## 2024-02-10 NOTE — PROGRESS NOTES
[facility]  OFFICE FOLLOW UP EVALUATION      HISTORY OF PRESENT ILLNESS:     Chief Complaint   Patient presents with    Follow - Up     Pt is F/U on bilateral carpal tunnel of upper limbs, Pt would like a refill on Pregabalin, Pt states that pain has stayed the same, Admits to N/T in all 10 fingers, Pain 5/10       Patient is following up for bilateral hand numbness tingling sensation.  She is having difficulty with  strength.  Pain is moderate to severe.  She has numbness tingling in the first 4 fingers primarily.  She denies any neck pain or radiating symptoms down the arm.  She has had carpal tunnel release twice in the past.  She has been doing bracing and exercises as well as topical treatment as tolerated.    PHYSICAL EXAM:   Pulse 70   Resp 18   Ht 65\"   Wt 232 lb (105.2 kg)   LMP 02/11/2020   SpO2 100%   BMI 38.61 kg/m²     Wrist range of motion is normal. Positive Tinel's at the wrist bilaterally.  Decreased  strength in bilateral hands    IMAGING:     None    All imaging results were reviewed and discussed with patient.      ASSESSMENT/PLAN:     1. Carpal tunnel syndrome, bilateral upper limbs    2. S/p bilateral carpal tunnel release        Tiff Rapp is a 58 year old female following up for bilateral hand numbness tingling sensation secondary to carpal tunnel syndrome.  I recommended orthopedic evaluation for consideration of carpal tunnel release.  She did have EMG testing several years ago which did show severe carpal tunnel syndrome at that time.  She has had corticosteroid injections and other treatments with minimal improvement.  Recommend that she follow-up after surgical evaluation.  Recommend she continue Lyrica 150 mg twice daily.      The patient verbalized understanding with the plan and was in agreement. All questions/concerns were addressed and there were no barriers to learning.  Please note Dragon dictation software was  used to dictate this note and may result in inadvertent typos.    Didi Kingsley DO, FAAPMR & CAQSM  Physical Medicine and Rehabilitation  Sports and Spine Medicine    PAST MEDICAL HISTORY:     Past Medical History:   Diagnosis Date    Carpal tunnel syndrome of left wrist 2021    Left Endoscopic Carpal Tunnel Release     Carpal tunnel syndrome on right 2020    Chalazion left upper eyelid 2022    Diabetes (HCC)     History of blood transfusion     @ [facility] over 20 yrs; no reactions    Osteoarthritis     Problems with swallowing     upper dentures    Thyroid disease     Visual impairment     wears glases         PAST SURGICAL HISTORY:     Past Surgical History:   Procedure Laterality Date          WRIST ARTHROSCOP,RELEASE XVERS LIG Right 01/15/2021    right endoscopic carpal tunnel release     WRIST ARTHROSCOP,RELEASE XVERS LIG Left 2021    Left Endoscopic Carpal Tunnel Release          CURRENT MEDICATIONS:     Current Outpatient Medications   Medication Sig Dispense Refill    pregabalin 150 MG Oral Cap Take 1 capsule (150 mg total) by mouth 2 (two) times daily. 180 capsule 0    ergocalciferol 1.25 MG (84489 UT) Oral Cap Take 1 capsule (50,000 Units total) by mouth once a week. COMPLETE LABS 4 capsule 0    omeprazole 20 MG Oral Capsule Delayed Release Take 1 capsule (20 mg total) by mouth before breakfast. 30 capsule 5    levothyroxine 25 MCG Oral Tab Take 1 tablet (25 mcg total) by mouth before breakfast. 90 tablet 0    Lancets (ONETOUCH DELICA PLUS [id number]) Does not apply Misc            ALLERGIES:   No Known Allergies      FAMILY HISTORY:     Family History   Problem Relation Age of Onset    Breast Cancer Mother 63    Diabetes Mother     Heart Disorder Father     Cancer Maternal Grandmother 60        unknown primary    Cancer Brother 50        throat/lung ca (smoker)    Diabetes Brother     Breast Cancer Maternal Cousin Female 38    Cancer Maternal Uncle 60        colon ca     Breast Cancer Paternal Cousin Female 51    Macular degeneration Neg     Glaucoma Neg           SOCIAL HISTORY:     Social History     Socioeconomic History    Marital status: Single   Tobacco Use    Smoking status: Never    Smokeless tobacco: Never   Vaping Use    Vaping Use: Never used   Substance and Sexual Activity    Alcohol use: Never    Drug use: Never   Other Topics Concern    Right Handed Yes          REVIEW OF SYSTEMS:   A comprehensive 10 point review of systems was completed.  Pertinent positives and negatives noted in the the HPI.      LABS:     Lab Results   Component Value Date    A1C 6.0 (H) 09/23/2022     Lab Results   Component Value Date    WBC 7.1 09/23/2022    RBC 5.01 09/23/2022    HGB 15.4 09/23/2022    HCT 44.1 09/23/2022    MCV 88.0 09/23/2022    MCH 30.7 09/23/2022    MCHC 34.9 09/23/2022    RDW 12.7 09/23/2022     09/23/2022    MPV 6.9 (L) 11/26/2018     Lab Results   Component Value Date     (H) 09/23/2022    BUN 9 09/23/2022    BUNCREA NOT APPLICABLE 09/23/2022    CREATSERUM 0.66 09/23/2022    ANIONGAP 7 02/01/2022    GFRNAA 99 02/01/2022    GFRAA 114 02/01/2022    CA 9.6 09/23/2022    OSMOCALC 296 (H) 02/01/2022    ALKPHO 80 09/23/2022    AST 48 (H) 09/23/2022    ALT 41 (H) 09/23/2022    BILT 0.7 09/23/2022    TP 7.1 09/23/2022    ALB 4.2 09/23/2022    GLOBULIN 2.9 09/23/2022    AGRATIO 1.4 09/23/2022     09/23/2022    K 4.1 09/23/2022     09/23/2022    CO2 26 09/23/2022     No results found for: \"PTP\", \"PT\", \"INR\"  Lab Results   Component Value Date    VITD 12 (L) 09/23/2022

## 2024-05-11 DIAGNOSIS — G56.03 CARPAL TUNNEL SYNDROME, BILATERAL UPPER LIMBS: ICD-10-CM

## 2024-05-11 DIAGNOSIS — G56.03 CARPAL TUNNEL SYNDROME, BILATERAL UPPER LIMBS: Primary | ICD-10-CM

## 2024-05-13 RX ORDER — PREGABALIN 150 MG/1
150 CAPSULE ORAL 2 TIMES DAILY
Qty: 180 CAPSULE | Refills: 0 | Status: SHIPPED | OUTPATIENT
Start: 2024-05-13

## 2024-05-13 RX ORDER — PREGABALIN 150 MG/1
150 CAPSULE ORAL 2 TIMES DAILY
Qty: 180 CAPSULE | Refills: 0 | OUTPATIENT
Start: 2024-05-13

## 2024-05-13 NOTE — TELEPHONE ENCOUNTER
Refill Request    Medication request: pregabalin 150 MG Oral Cap   Take 1 capsule (150 mg total) by mouth 2 (two) times daily.     LOV: 2/8/2024 Didi Kingsley DO     NOV: Visit date not found      [place]: 2/8/24 #90    UDS: (if applicable): none  Pain contract: none on file    LOV plan: Patient was to see Ortho Surgeon.

## 2024-07-19 ENCOUNTER — TELEPHONE (OUTPATIENT)
Dept: FAMILY MEDICINE CLINIC | Facility: CLINIC | Age: 59
End: 2024-07-19

## 2024-08-21 ENCOUNTER — HOSPITAL ENCOUNTER (EMERGENCY)
Facility: HOSPITAL | Age: 59
Discharge: HOME | End: 2024-08-21
Attending: EMERGENCY MEDICINE
Payer: MEDICAID

## 2024-08-21 ENCOUNTER — APPOINTMENT (OUTPATIENT)
Dept: GENERAL RADIOLOGY | Facility: HOSPITAL | Age: 59
End: 2024-08-21
Attending: EMERGENCY MEDICINE
Payer: MEDICAID

## 2024-08-21 VITALS
HEIGHT: 66 IN | SYSTOLIC BLOOD PRESSURE: 143 MMHG | HEART RATE: 65 BPM | RESPIRATION RATE: 18 BRPM | WEIGHT: 220 LBS | DIASTOLIC BLOOD PRESSURE: 87 MMHG | OXYGEN SATURATION: 94 % | BODY MASS INDEX: 35.36 KG/M2 | TEMPERATURE: 101 F

## 2024-08-21 DIAGNOSIS — R07.9 CHEST PAIN OF UNCERTAIN ETIOLOGY: ICD-10-CM

## 2024-08-21 DIAGNOSIS — U07.1 COVID-19 VIRUS INFECTION: Primary | ICD-10-CM

## 2024-08-21 LAB
ANION GAP SERPL CALC-SCNC: 6 MMOL/L (ref 0–18)
BASOPHILS # BLD AUTO: 0.03 X10(3) UL (ref 0–0.2)
BASOPHILS NFR BLD AUTO: 0.4 %
BUN BLD-MCNC: 7 MG/DL (ref 9–23)
BUN/CREAT SERPL: 8.3 (ref 10–20)
CALCIUM BLD-MCNC: 9.3 MG/DL (ref 8.7–10.4)
CHLORIDE SERPL-SCNC: 107 MMOL/L (ref 98–112)
CO2 SERPL-SCNC: 26 MMOL/L (ref 21–32)
CREAT BLD-MCNC: 0.84 MG/DL
DEPRECATED RDW RBC AUTO: 40.2 FL (ref 35.1–46.3)
EGFRCR SERPLBLD CKD-EPI 2021: 82 ML/MIN/1.73M2 (ref 60–?)
EOSINOPHIL # BLD AUTO: 0.06 X10(3) UL (ref 0–0.7)
EOSINOPHIL NFR BLD AUTO: 0.8 %
ERYTHROCYTE [DISTWIDTH] IN BLOOD BY AUTOMATED COUNT: 13 % (ref 11–15)
GLUCOSE BLD-MCNC: 118 MG/DL (ref 70–99)
HCT VFR BLD AUTO: 41.7 %
HGB BLD-MCNC: 15.1 G/DL
IMM GRANULOCYTES # BLD AUTO: 0.02 X10(3) UL (ref 0–1)
IMM GRANULOCYTES NFR BLD: 0.3 %
LYMPHOCYTES # BLD AUTO: 1.91 X10(3) UL (ref 1–4)
LYMPHOCYTES NFR BLD AUTO: 26.9 %
MCH RBC QN AUTO: 31.1 PG (ref 26–34)
MCHC RBC AUTO-ENTMCNC: 36.2 G/DL (ref 31–37)
MCV RBC AUTO: 85.8 FL
MONOCYTES # BLD AUTO: 0.68 X10(3) UL (ref 0.1–1)
MONOCYTES NFR BLD AUTO: 9.6 %
NEUTROPHILS # BLD AUTO: 4.4 X10 (3) UL (ref 1.5–7.7)
NEUTROPHILS # BLD AUTO: 4.4 X10(3) UL (ref 1.5–7.7)
NEUTROPHILS NFR BLD AUTO: 62 %
OSMOLALITY SERPL CALC.SUM OF ELEC: 287 MOSM/KG (ref 275–295)
PLATELET # BLD AUTO: 171 10(3)UL (ref 150–450)
POTASSIUM SERPL-SCNC: 3.8 MMOL/L (ref 3.5–5.1)
RBC # BLD AUTO: 4.86 X10(6)UL
SARS-COV-2 RNA RESP QL NAA+PROBE: DETECTED
SODIUM SERPL-SCNC: 139 MMOL/L (ref 136–145)
TROPONIN I SERPL HS-MCNC: 3 NG/L
WBC # BLD AUTO: 7.1 X10(3) UL (ref 4–11)

## 2024-08-21 PROCEDURE — 71046 X-RAY EXAM CHEST 2 VIEWS: CPT | Performed by: EMERGENCY MEDICINE

## 2024-08-21 PROCEDURE — 99285 EMERGENCY DEPT VISIT HI MDM: CPT

## 2024-08-21 PROCEDURE — 99284 EMERGENCY DEPT VISIT MOD MDM: CPT

## 2024-08-21 PROCEDURE — 80048 BASIC METABOLIC PNL TOTAL CA: CPT | Performed by: EMERGENCY MEDICINE

## 2024-08-21 PROCEDURE — 84484 ASSAY OF TROPONIN QUANT: CPT | Performed by: EMERGENCY MEDICINE

## 2024-08-21 PROCEDURE — 85025 COMPLETE CBC W/AUTO DIFF WBC: CPT | Performed by: EMERGENCY MEDICINE

## 2024-08-21 PROCEDURE — 93010 ELECTROCARDIOGRAM REPORT: CPT

## 2024-08-21 PROCEDURE — 93005 ELECTROCARDIOGRAM TRACING: CPT

## 2024-08-21 PROCEDURE — 36415 COLL VENOUS BLD VENIPUNCTURE: CPT

## 2024-08-21 RX ORDER — ALBUTEROL SULFATE 90 UG/1
2 AEROSOL, METERED RESPIRATORY (INHALATION) EVERY 4 HOURS PRN
Qty: 1 EACH | Refills: 0 | Status: SHIPPED | OUTPATIENT
Start: 2024-08-21 | End: 2024-09-20

## 2024-08-21 RX ORDER — ACETAMINOPHEN 500 MG
1000 TABLET ORAL ONCE
Status: COMPLETED | OUTPATIENT
Start: 2024-08-21 | End: 2024-08-21

## 2024-08-21 NOTE — ED INITIAL ASSESSMENT (HPI)
Patient ambulatory to ED for chest pain shortness, chest pain, headache,bilateral ear pain and overall malaise since yesterday morning. Unsure if febrile but \"felt warm\". Denies sick contact or recent travel. Patient speaking in full sentences and AAOx4.

## 2024-08-21 NOTE — ED PROVIDER NOTES
Patient Seen in: [facility] Emergency Department    History   No chief complaint on file.      HPI    58-year-old female presents ER with complaint of chest pain shortness of breath for the past 2 days.  Patient past medical history of diabetes.  Patient states she has bodyaches and fevers as well.  Patient denies any sick contacts.    History reviewed.   Past Medical History:    Carpal tunnel syndrome of left wrist    Left Endoscopic Carpal Tunnel Release     Carpal tunnel syndrome on right    Chalazion left upper eyelid    Diabetes (HCC)    History of blood transfusion    @ [facility] over 20 yrs; no reactions    Osteoarthritis    Problems with swallowing    upper dentures    Thyroid disease    Visual impairment    wears glases       History reviewed.   Past Surgical History:   Procedure Laterality Date          Wrist arthroscop,release xvers lig Right 01/15/2021    right endoscopic carpal tunnel release     Wrist arthroscop,release xvers lig Left 2021    Left Endoscopic Carpal Tunnel Release          Medications :  (Not in a hospital admission)       Family History   Problem Relation Age of Onset    Breast Cancer Mother 63    Diabetes Mother     Heart Disorder Father     Cancer Maternal Grandmother 60        unknown primary    Cancer Brother 50        throat/lung ca (smoker)    Diabetes Brother     Breast Cancer Maternal Cousin Female 38    Cancer Maternal Uncle 60        colon ca    Breast Cancer Paternal Cousin Female 51    Macular degeneration Neg     Glaucoma Neg        Smoking Status:   Social History     Socioeconomic History    Marital status: Single   Tobacco Use    Smoking status: Never    Smokeless tobacco: Never   Vaping Use    Vaping status: Never Used   Substance and Sexual Activity    Alcohol use: Never    Drug use: Never   Other Topics Concern    Right Handed Yes       ROS  All pertinent positives for the review of systems are mentioned in the HPI  All other organ systems are  reviewed and are negative.    Constitutional and vital signs reviewed.      Social History and Family History elements reviewed from today, pertinent positives to the presenting problem noted.    Physical Exam     ED Triage Vitals [08/21/24 1700]   /79   Pulse 67   Resp 19   Temp (!) 100.9 °F (38.3 °C)   Temp src Oral   SpO2 98 %   O2 Device None (Room air)       All measures to prevent infection transmission during my interaction with the patient were taken. The patient was already wearing a droplet mask on my arrival to the room. Personal protective equipment including droplet mask, eye protection, and gloves were worn throughout the duration of the exam.  Handwashing was performed prior to and after the exam.  Stethoscope and any equipment used during my examination was cleaned with super sani-cloth germicidal wipes following the exam.     Physical Exam  Vitals and nursing note reviewed.   Constitutional:       Appearance: She is well-developed.   HENT:      Head: Normocephalic and atraumatic.      Right Ear: External ear normal.      Left Ear: External ear normal.      Nose: Nose normal.   Eyes:      Conjunctiva/sclera: Conjunctivae normal.      Pupils: Pupils are equal, round, and reactive to light.   Cardiovascular:      Rate and Rhythm: Normal rate and regular rhythm.      Heart sounds: Normal heart sounds.   Pulmonary:      Effort: Pulmonary effort is normal.      Breath sounds: Normal breath sounds.   Abdominal:      General: Bowel sounds are normal.      Palpations: Abdomen is soft.   Musculoskeletal:         General: Normal range of motion.      Cervical back: Normal range of motion and neck supple.   Skin:     General: Skin is warm and dry.   Neurological:      General: No focal deficit present.      Mental Status: She is alert and oriented to person, place, and time. Mental status is at baseline.      Cranial Nerves: No cranial nerve deficit.      Sensory: No sensory deficit.      Motor: No  weakness.      Deep Tendon Reflexes: Reflexes are normal and symmetric.   Psychiatric:         Behavior: Behavior normal.         Thought Content: Thought content normal.         Judgment: Judgment normal.         ED Course        Labs Reviewed   BASIC METABOLIC PANEL (8) - Abnormal; Notable for the following components:       Result Value    Glucose 118 (*)     BUN 7 (*)     BUN/CREA Ratio 8.3 (*)     All other components within normal limits   RAPID SARS-COV-2 BY PCR - Abnormal; Notable for the following components:    Rapid SARS-CoV-2 by PCR Detected (*)     All other components within normal limits   TROPONIN I HIGH SENSITIVITY - Normal   CBC WITH DIFFERENTIAL WITH PLATELET   RAINBOW DRAW LAVENDER   RAINBOW DRAW LIGHT GREEN     EKG    Rate, intervals and axes as noted on EKG Report.  Rate: 76  Rhythm: Sinus Rhythm  Reading: No ST deviation, normal axis.             Imaging Results Available and Reviewed while in ED: XR CHEST PA + LAT CHEST (CPT=71046)    Result Date: 8/21/2024  CONCLUSION: No acute cardiopulmonary abnormality.    Dictated by (CST): Nikolas Aceves MD on 8/21/2024 at 6:47 PM     Finalized by (CST): Nikolas Aceves MD on 8/21/2024 at 6:47 PM         ED Medications Administered:   Medications   acetaminophen (Tylenol Extra Strength) tab 1,000 mg (1,000 mg Oral Given 8/21/24 1708)         MDM     Vitals:    08/21/24 1700 08/21/24 1815 08/21/24 1900   BP: 143/79 113/73 132/89   Pulse: 67 66 62   Resp: 19 21 22   Temp: (!) 100.9 °F (38.3 °C)     TempSrc: Oral     SpO2: 98% 92% 93%   Weight: 99.8 kg     Height: 167.6 cm (5' 6\")       *I personally reviewed and interpreted all ED vitals.  I also personally reviewed all labs and imaging if ordered    Pulse Ox: 92%, Room air, Normal     Monitor Interpretation:   normal sinus rhythm    Differential Diagnosis/ Diagnostic Considerations: Chest pain, chest wall pain, pneumonia, COVID-19.    Medical Record Review: I personally reviewed available prior medical  records for any recent pertinent discharge summaries, testing, and procedures and reviewed those reports.    Complicating Factors: The patient already has does not have any pertinent problems on file. to contribute to the complexity of this ED evaluation.    Medical Decision Making  58-year-old female presents ER with complaint of chest pain shortness of breath.  Patient with low-grade temp on arrival to the ER.  Patient tested positive for COVID-19.  Patient is EKG and troponin within normal limits.  Patient chest x-ray negative.  Patient given a prescription for albuterol inhaler to help with her shortness of breath as well as ibuprofen instructed follow with PCP symptoms continue.    HEART score for chest pain  History- (Highly suspicious 2pt, Mod 1pt, slightly 0pt)        0  ECG- (significant ST deviation 2pt, Non spec 1pt, nl 0pt)  0  AGE- (>65 2pt, 45-64 1 pt, Under 45 0 pt)    1  Risk Factors- ( DM,HTN,Chol, fhx CAD, BMI>30, hx CAD)  ( >3 or hx CAD 2pt, 1-2 risks 1pt, None 0pt)  1  Troponin- ( 3 times nl 2pt, 2 times nl 1pt, nl 0pt   0         TOTAL  2    SCORE 0-3: 2.5% MACE over next 6 weeks consider D/C outpatient F/U  SCORE 4-6: 20.3% MACE over next 6 weeks consider admit  SCORE 7-10:72.7% MACE over next 6 weeks consider early invasive strategy.    Patient has a HEART score of 2, plan will be discharge.       Problems Addressed:  Chest pain of uncertain etiology: acute illness or injury  COVID-19 virus infection: acute illness or injury    Amount and/or Complexity of Data Reviewed  Labs: ordered. Decision-making details documented in ED Course.  Radiology: ordered and independent interpretation performed. Decision-making details documented in ED Course.     Details: Chest x-ray interpreted by myself shows no acute cardiopulmonary issues    Risk  Prescription drug management.        Condition upon leaving the department: Stable    Disposition and Plan     Clinical Impression:  1. COVID-19 virus infection     2. Chest pain of uncertain etiology        Disposition:  Discharge    Follow-up:  Fabio Guallpa MD  [address] [place] [address] [id number] [phone]    Schedule an appointment as soon as possible for a visit  If symptoms worsen      Medications Prescribed:  Current Discharge Medication List        START taking these medications    Details   albuterol 108 (90 Base) MCG/ACT Inhalation Aero Soln Inhale 2 puffs into the lungs every 4 (four) hours as needed.  Qty: 1 each, Refills: 0

## 2024-08-22 LAB
ATRIAL RATE: 76 BPM
P AXIS: 31 DEGREES
P-R INTERVAL: 142 MS
Q-T INTERVAL: 362 MS
QRS DURATION: 66 MS
QTC CALCULATION (BEZET): 407 MS
R AXIS: -18 DEGREES
T AXIS: 14 DEGREES
VENTRICULAR RATE: 76 BPM

## 2024-09-06 DIAGNOSIS — G56.03 CARPAL TUNNEL SYNDROME, BILATERAL UPPER LIMBS: ICD-10-CM

## 2024-09-06 NOTE — TELEPHONE ENCOUNTER
Refill Request    Medication request: PREGABALIN 150 MG Oral Cap. TAKE 1 CAPSULE BY MOUTH 2 TIMES A DAY     LOV:2/8/2024 Didi Kingsley, DO   Due back to clinic per last office note: Per Dr. Kingsley: \"See orthopedic surgery\"  NOV: Visit date not found      [place]: 05/13/2024 [place]180 [place]    Urine drug screen (if applicable): n/a  Pain contract: n/a    LOV plan (if weaning or changing medications): Pregabalin not mentioned in LOV note.

## 2024-09-07 ENCOUNTER — TELEPHONE (OUTPATIENT)
Dept: FAMILY MEDICINE CLINIC | Facility: CLINIC | Age: 59
End: 2024-09-07

## 2024-09-07 DIAGNOSIS — G56.03 CARPAL TUNNEL SYNDROME, BILATERAL UPPER LIMBS: ICD-10-CM

## 2024-09-07 DIAGNOSIS — E55.9 VITAMIN D DEFICIENCY, UNSPECIFIED: ICD-10-CM

## 2024-09-09 RX ORDER — PREGABALIN 150 MG/1
150 CAPSULE ORAL 2 TIMES DAILY
Qty: 180 CAPSULE | Refills: 0 | Status: SHIPPED | OUTPATIENT
Start: 2024-09-09

## 2024-09-09 NOTE — TELEPHONE ENCOUNTER
Refill Request    Medication request: PREGABALIN 150 MG Oral Cap.  TAKE 1 CAPSULE BY MOUTH 2 TIMES A DAY     LOV:2/8/2024 Didi Kingsley, DO   Due back to clinic per last office note:  Recommend that she follow-up after surgical evaluation.   NOV: Visit date not found      [place]: 5/13/24 #180 (90  days)    Urine drug screen (if applicable): N/A  Pain contract: N/A    LOV plan (if weaning or changing medications): Recommend she continue Lyrica 150 mg twice daily.

## 2024-09-11 RX ORDER — ERGOCALCIFEROL 1.25 MG/1
50000 CAPSULE, LIQUID FILLED ORAL WEEKLY
Qty: 4 CAPSULE | Refills: 0 | OUTPATIENT
Start: 2024-09-11

## 2024-09-11 NOTE — TELEPHONE ENCOUNTER
Please review. Protocol Failed; No Protocol        Component  Ref Range & Units 9/23/22 11:01 AM   VITAMIN D, 25-OH, TOTAL  30 - 100 ng/mL 12 Low         Message sent to patient to complete labs     Requested Prescriptions   Pending Prescriptions Disp Refills    ergocalciferol 1.25 MG (64732 UT) Oral Cap 4 capsule 0     Sig: Take 1 capsule (50,000 Units total) by mouth once a week. COMPLETE LABS       There is no refill protocol information for this order            Future Appointments         Provider Department Appt Notes    In 1 week Fabio Guallpa MD [facility] Diabetes          Recent Outpatient Visits              7 months ago Carpal tunnel syndrome, bilateral upper limbs    [facility] [address], [place] Didi Kingsley DO    Office Visit    11 months ago Well adult exam    [facility] Fabio Guallpa MD    Office Visit    1 year ago     [facility] Camilo Millan OT    Office Visit    1 year ago     [facility] Camilo Millan OT    Office Visit    1 year ago     [facility] Camilo Millan OT    Office Visit

## 2024-09-13 NOTE — TELEPHONE ENCOUNTER
[[employer] message has not been read yet]    Patient contacted and made aware of fasting ab orders to be completed; also reviewed RN's [employer] message sent as well with instructions. Patient will go to [facility] and will fast. Patient verbalized understanding. No further questions or concerns at this time.

## 2024-09-17 LAB
ABSOLUTE BASOPHILS: 28 CELLS/UL (ref 0–200)
ABSOLUTE EOSINOPHILS: 270 CELLS/UL (ref 15–500)
ABSOLUTE LYMPHOCYTES: 2376 CELLS/UL (ref 850–3900)
ABSOLUTE MONOCYTES: 303 CELLS/UL (ref 200–950)
ABSOLUTE NEUTROPHILS: 2525 CELLS/UL (ref 1500–7800)
ALBUMIN/GLOBULIN RATIO: 1.2 (CALC) (ref 1–2.5)
ALBUMIN: 3.5 G/DL (ref 3.6–5.1)
ALKALINE PHOSPHATASE: 85 U/L (ref 37–153)
ALT: 33 U/L (ref 6–29)
AST: 51 U/L (ref 10–35)
BASOPHILS: 0.5 %
BILIRUBIN, TOTAL: 1.1 MG/DL (ref 0.2–1.2)
BUN: 8 MG/DL (ref 7–25)
CALCIUM: 8.7 MG/DL (ref 8.6–10.4)
CARBON DIOXIDE: 28 MMOL/L (ref 20–32)
CHLORIDE: 107 MMOL/L (ref 98–110)
CHOL/HDLC RATIO: 3.1 (CALC)
CHOLESTEROL, TOTAL: 116 MG/DL
CREATININE, RANDOM URINE: 184 MG/DL (ref 20–275)
CREATININE: 0.82 MG/DL (ref 0.5–1.03)
EGFR: 84 ML/MIN/1.73M2
EOSINOPHILS: 4.9 %
GLOBULIN: 2.9 G/DL (CALC) (ref 1.9–3.7)
GLUCOSE: 117 MG/DL (ref 65–99)
HDL CHOLESTEROL: 38 MG/DL
HEMATOCRIT: 43.9 % (ref 35–45)
HEMOGLOBIN A1C: 6.4 % OF TOTAL HGB
HEMOGLOBIN: 15 G/DL (ref 11.7–15.5)
LDL-CHOLESTEROL: 64 MG/DL (CALC)
LYMPHOCYTES: 43.2 %
MCH: 30.6 PG (ref 27–33)
MCHC: 34.2 G/DL (ref 32–36)
MCV: 89.6 FL (ref 80–100)
MICROALBUMIN/CREATININE RATIO, RANDOM URINE: 10 MG/G CREAT
MICROALBUMIN: 1.9 MG/DL
MONOCYTES: 5.5 %
MPV: 10.4 FL (ref 7.5–12.5)
NEUTROPHILS: 45.9 %
NON-HDL CHOLESTEROL: 78 MG/DL (CALC)
PLATELET COUNT: 162 THOUSAND/UL (ref 140–400)
POTASSIUM: 3.8 MMOL/L (ref 3.5–5.3)
PROTEIN, TOTAL: 6.4 G/DL (ref 6.1–8.1)
RDW: 13.1 % (ref 11–15)
RED BLOOD CELL COUNT: 4.9 MILLION/UL (ref 3.8–5.1)
SODIUM: 143 MMOL/L (ref 135–146)
T4, FREE: 0.7 NG/DL (ref 0.8–1.8)
TRIGLYCERIDES: 61 MG/DL
TSH W/REFLEX TO FT4: 28.15 MIU/L (ref 0.4–4.5)
VITAMIN B12: 340 PG/ML (ref 200–1100)
VITAMIN D, 25-OH, TOTAL: 47 NG/ML (ref 30–100)
WHITE BLOOD CELL COUNT: 5.5 THOUSAND/UL (ref 3.8–10.8)

## 2024-12-17 PROCEDURE — 93010 ELECTROCARDIOGRAM REPORT: CPT

## 2024-12-17 PROCEDURE — 96374 THER/PROPH/DIAG INJ IV PUSH: CPT

## 2024-12-17 PROCEDURE — 93005 ELECTROCARDIOGRAM TRACING: CPT

## 2024-12-17 PROCEDURE — 99284 EMERGENCY DEPT VISIT MOD MDM: CPT

## 2024-12-17 PROCEDURE — 96375 TX/PRO/DX INJ NEW DRUG ADDON: CPT

## 2024-12-18 ENCOUNTER — HOSPITAL ENCOUNTER (EMERGENCY)
Facility: HOSPITAL | Age: 59
Discharge: HOME | End: 2024-12-18
Attending: EMERGENCY MEDICINE
Payer: MEDICAID

## 2024-12-18 ENCOUNTER — APPOINTMENT (OUTPATIENT)
Dept: GENERAL RADIOLOGY | Facility: HOSPITAL | Age: 59
End: 2024-12-18
Attending: EMERGENCY MEDICINE
Payer: MEDICAID

## 2024-12-18 VITALS
WEIGHT: 230 LBS | SYSTOLIC BLOOD PRESSURE: 164 MMHG | TEMPERATURE: 98 F | OXYGEN SATURATION: 98 % | HEART RATE: 58 BPM | RESPIRATION RATE: 15 BRPM | HEIGHT: 65 IN | BODY MASS INDEX: 38.32 KG/M2 | DIASTOLIC BLOOD PRESSURE: 98 MMHG

## 2024-12-18 DIAGNOSIS — R07.9 CHEST PAIN OF UNCERTAIN ETIOLOGY: Primary | ICD-10-CM

## 2024-12-18 DIAGNOSIS — M94.0 COSTOCHONDRITIS: ICD-10-CM

## 2024-12-18 LAB
ANION GAP SERPL CALC-SCNC: 6 MMOL/L (ref 0–18)
ATRIAL RATE: 67 BPM
BASOPHILS # BLD AUTO: 0.04 X10(3) UL (ref 0–0.2)
BASOPHILS NFR BLD AUTO: 0.6 %
BUN BLD-MCNC: 8 MG/DL (ref 9–23)
BUN/CREAT SERPL: 9.9 (ref 10–20)
CALCIUM BLD-MCNC: 9.8 MG/DL (ref 8.7–10.4)
CHLORIDE SERPL-SCNC: 107 MMOL/L (ref 98–112)
CO2 SERPL-SCNC: 29 MMOL/L (ref 21–32)
CREAT BLD-MCNC: 0.81 MG/DL
DEPRECATED RDW RBC AUTO: 41.6 FL (ref 35.1–46.3)
EGFRCR SERPLBLD CKD-EPI 2021: 85 ML/MIN/1.73M2 (ref 60–?)
EOSINOPHIL # BLD AUTO: 0.24 X10(3) UL (ref 0–0.7)
EOSINOPHIL NFR BLD AUTO: 3.7 %
ERYTHROCYTE [DISTWIDTH] IN BLOOD BY AUTOMATED COUNT: 13.3 % (ref 11–15)
GLUCOSE BLD-MCNC: 143 MG/DL (ref 70–99)
HCT VFR BLD AUTO: 43.8 %
HGB BLD-MCNC: 15.3 G/DL
IMM GRANULOCYTES # BLD AUTO: 0.01 X10(3) UL (ref 0–1)
IMM GRANULOCYTES NFR BLD: 0.2 %
LYMPHOCYTES # BLD AUTO: 2.77 X10(3) UL (ref 1–4)
LYMPHOCYTES NFR BLD AUTO: 42.9 %
MCH RBC QN AUTO: 30.2 PG (ref 26–34)
MCHC RBC AUTO-ENTMCNC: 34.9 G/DL (ref 31–37)
MCV RBC AUTO: 86.6 FL
MONOCYTES # BLD AUTO: 0.41 X10(3) UL (ref 0.1–1)
MONOCYTES NFR BLD AUTO: 6.3 %
NEUTROPHILS # BLD AUTO: 2.99 X10 (3) UL (ref 1.5–7.7)
NEUTROPHILS # BLD AUTO: 2.99 X10(3) UL (ref 1.5–7.7)
NEUTROPHILS NFR BLD AUTO: 46.3 %
OSMOLALITY SERPL CALC.SUM OF ELEC: 295 MOSM/KG (ref 275–295)
P AXIS: 31 DEGREES
P-R INTERVAL: 140 MS
PLATELET # BLD AUTO: 222 10(3)UL (ref 150–450)
POTASSIUM SERPL-SCNC: 3.8 MMOL/L (ref 3.5–5.1)
Q-T INTERVAL: 412 MS
QRS DURATION: 76 MS
QTC CALCULATION (BEZET): 435 MS
R AXIS: -5 DEGREES
RBC # BLD AUTO: 5.06 X10(6)UL
SODIUM SERPL-SCNC: 142 MMOL/L (ref 136–145)
T AXIS: 17 DEGREES
TROPONIN I SERPL HS-MCNC: <3 NG/L
VENTRICULAR RATE: 67 BPM
WBC # BLD AUTO: 6.5 X10(3) UL (ref 4–11)

## 2024-12-18 PROCEDURE — 84484 ASSAY OF TROPONIN QUANT: CPT | Performed by: EMERGENCY MEDICINE

## 2024-12-18 PROCEDURE — S0028 INJECTION, FAMOTIDINE, 20 MG: HCPCS | Performed by: EMERGENCY MEDICINE

## 2024-12-18 PROCEDURE — 71045 X-RAY EXAM CHEST 1 VIEW: CPT | Performed by: EMERGENCY MEDICINE

## 2024-12-18 PROCEDURE — 85025 COMPLETE CBC W/AUTO DIFF WBC: CPT | Performed by: EMERGENCY MEDICINE

## 2024-12-18 PROCEDURE — 80048 BASIC METABOLIC PNL TOTAL CA: CPT | Performed by: EMERGENCY MEDICINE

## 2024-12-18 RX ORDER — KETOROLAC TROMETHAMINE 15 MG/ML
15 INJECTION, SOLUTION INTRAMUSCULAR; INTRAVENOUS ONCE
Status: COMPLETED | OUTPATIENT
Start: 2024-12-18 | End: 2024-12-18

## 2024-12-18 RX ORDER — FAMOTIDINE 10 MG/ML
20 INJECTION, SOLUTION INTRAVENOUS ONCE
Status: COMPLETED | OUTPATIENT
Start: 2024-12-18 | End: 2024-12-18

## 2024-12-18 RX ORDER — KETOROLAC TROMETHAMINE 10 MG/1
10 TABLET, FILM COATED ORAL EVERY 6 HOURS PRN
Qty: 20 TABLET | Refills: 0 | Status: SHIPPED | OUTPATIENT
Start: 2024-12-18 | End: 2024-12-25

## 2024-12-18 NOTE — ED INITIAL ASSESSMENT (HPI)
Patient to ED for chest pain that has been intermittent for the last 3 weeks. Patient reports it was really bad tonight. Patient also states she has a sore throat.

## 2024-12-18 NOTE — ED PROVIDER NOTES
Patient Seen in: [facility] Emergency Department      History     Chief Complaint   Patient presents with    Chest Pain Angina     Stated Complaint: Chest Pain    Subjective:   The history is provided by the patient.         58 year old female with h/o dm, arthritis, hypothyroidism, chronic nerve pain who presents with 1 month of intermittent CP that she describes as peristernal burning and radiating up her chest. Pain is not positional, not specifically after eating or laying down.   Associated sx - sob  Pt states sx last about 1 min, then goes away on its own.   Pt came in tonight specifically bc she had continued burning that did not go away.     Objective:     Past Medical History:    Carpal tunnel syndrome of left wrist    Left Endoscopic Carpal Tunnel Release     Carpal tunnel syndrome on right    Chalazion left upper eyelid    Diabetes (HCC)    History of blood transfusion    @ [facility] over 20 yrs; no reactions    Osteoarthritis    Problems with swallowing    upper dentures    Thyroid disease    Visual impairment    wears glases              Past Surgical History:   Procedure Laterality Date          Wrist arthroscop,release xvers lig Right 01/15/2021    right endoscopic carpal tunnel release     Wrist arthroscop,release xvers lig Left 2021    Left Endoscopic Carpal Tunnel Release                 Social History     Socioeconomic History    Marital status: Single   Tobacco Use    Smoking status: Never    Smokeless tobacco: Never   Vaping Use    Vaping status: Never Used   Substance and Sexual Activity    Alcohol use: Never    Drug use: Never   Other Topics Concern    Right Handed Yes     Social Drivers of Health     Food Insecurity: Low Risk  (2022)    Received [facility], [facility]    Food Insecurity     Have there been times that your food ran out, and you didn't have money to get more?: No     Are there times that you worry that  this might happen?: No   Transportation Needs: Low Risk  (1/6/2022)    [facility], [facility]    Transportation Needs     Do you have trouble getting transportation to medical appointments?: No    [facility], [facility]                  Physical Exam     ED Triage Vitals [12/17/24 2353]   /78   Pulse 67   Resp 18   Temp 97.5 °F (36.4 °C)   Temp src Temporal   SpO2 98 %   O2 Device None (Room air)       Current Vitals:   No data recorded      Physical Exam  Vitals and nursing note reviewed.   Constitutional:       General: She is not in acute distress.     Appearance: Normal appearance. She is well-developed. She is not ill-appearing, toxic-appearing or diaphoretic.   HENT:      Head: Normocephalic and atraumatic.   Eyes:      Conjunctiva/sclera: Conjunctivae normal.      Pupils: Pupils are equal, round, and reactive to light.   Cardiovascular:      Rate and Rhythm: Normal rate and regular rhythm.      Pulses: Normal pulses.      Heart sounds: Normal heart sounds. No murmur heard.  Pulmonary:      Effort: Pulmonary effort is normal. No respiratory distress.      Breath sounds: Normal breath sounds. No wheezing.   Chest:      Chest wall: Tenderness (peristernal ttp, no trauma) present.   Abdominal:      General: There is no distension.      Palpations: Abdomen is soft.      Tenderness: There is no abdominal tenderness. There is no guarding.   Musculoskeletal:         General: No tenderness. Normal range of motion.      Cervical back: Full passive range of motion without pain, normal range of motion and neck supple. No rigidity. Normal range of motion.      Right lower leg: No edema.      Left lower leg: No edema.   Skin:     General: Skin is warm and dry.      Findings: No rash.   Neurological:      Mental Status: She is alert and oriented to person, place, and time.      GCS: GCS eye subscore is 4. GCS verbal subscore is  5. GCS motor subscore is 6.      Sensory: Sensation is intact. No sensory deficit.      Motor: Motor function is intact. No weakness.   Psychiatric:         Attention and Perception: Attention normal.         Mood and Affect: Mood normal.         Behavior: Behavior normal. Behavior is cooperative.             ED Course     Labs Reviewed   BASIC METABOLIC PANEL (8) - Abnormal; Notable for the following components:       Result Value    Glucose 143 (*)     BUN 8 (*)     BUN/CREA Ratio 9.9 (*)     All other components within normal limits   TROPONIN I HIGH SENSITIVITY - Normal   CBC WITH DIFFERENTIAL WITH PLATELET     EKG    Rate, intervals and axes as noted on EKG Report.  Rate: 67  Rhythm: Sinus Rhythm  Reading: NSR                MDM      Pulse Ox: 98%, Normal, RA    Cardiac Monitor:   Pulse Readings from Last 1 Encounters:   12/18/24 58   , sinus, normal for rate and rhythm       Medications   famotidine (Pepcid) 20 mg/2mL injection 20 mg (20 mg Intravenous Given 12/18/24 0346)   ketorolac (Toradol) 15 MG/ML injection 15 mg (15 mg Intravenous Given 12/18/24 0345)       Discussed otc and prescription  medication use, supportive care for costochondritis and return precautions for cp.    Disposition and Plan     Clinical Impression:  1. Chest pain of uncertain etiology    2. Costochondritis         Disposition:  Discharge  12/18/2024  4:26 am    Follow-up:  Fabio Guallpa MD  [address] [place] [id number] [phone]    Schedule an appointment as soon as possible for a visit            Medications Prescribed:  Discharge Medication List as of 12/18/2024  4:41 AM        START taking these medications    Details   Ketorolac Tromethamine 10 MG Oral Tab Take 1 tablet (10 mg total) by mouth every 6 (six) hours as needed for Pain., Normal, Disp-20 tablet, R-0                 Supplementary Documentation:

## 2025-01-03 DIAGNOSIS — G56.03 CARPAL TUNNEL SYNDROME, BILATERAL UPPER LIMBS: ICD-10-CM

## 2025-01-03 RX ORDER — PREGABALIN 150 MG/1
150 CAPSULE ORAL 2 TIMES DAILY
Qty: 180 CAPSULE | Refills: 0 | Status: SHIPPED | OUTPATIENT
Start: 2025-01-03

## 2025-01-03 NOTE — TELEPHONE ENCOUNTER
Refill Request    Medication request: PREGABALIN 150 MG Oral Cap   TAKE 1 CAPSULE BY MOUTH 2 TIMES A DAY     LOV:2/8/2024 Didi Kingsley, DO   Due back to clinic per last office note:  \"Recommend that she follow-up after surgical evaluation \"  NOV: Visit date not found      [place] 9/9/24 #90 - 45 day supply    Urine drug screen (if applicable): n/a  Pain contract: n/a    LOV plan (if weaning or changing medications): Per  at last office visit: \"Recommend she continue Lyrica 150 mg twice daily. \"

## 2025-02-17 ENCOUNTER — LAB ENCOUNTER (OUTPATIENT)
Dept: LAB | Age: 60
End: 2025-02-17
Attending: FAMILY MEDICINE
Payer: MEDICAID

## 2025-02-17 ENCOUNTER — OFFICE VISIT (OUTPATIENT)
Dept: FAMILY MEDICINE CLINIC | Facility: CLINIC | Age: 60
End: 2025-02-17
Payer: MEDICAID

## 2025-02-17 VITALS
HEART RATE: 62 BPM | BODY MASS INDEX: 36.32 KG/M2 | DIASTOLIC BLOOD PRESSURE: 84 MMHG | TEMPERATURE: 97 F | SYSTOLIC BLOOD PRESSURE: 126 MMHG | HEIGHT: 66 IN | WEIGHT: 226 LBS

## 2025-02-17 DIAGNOSIS — Z12.31 ENCOUNTER FOR SCREENING MAMMOGRAM FOR BREAST CANCER: ICD-10-CM

## 2025-02-17 DIAGNOSIS — R07.89 CHEST WALL PAIN, CHRONIC: Primary | ICD-10-CM

## 2025-02-17 DIAGNOSIS — G89.29 CHEST WALL PAIN, CHRONIC: Primary | ICD-10-CM

## 2025-02-17 DIAGNOSIS — G89.29 CHEST WALL PAIN, CHRONIC: ICD-10-CM

## 2025-02-17 DIAGNOSIS — R07.89 CHEST WALL PAIN, CHRONIC: ICD-10-CM

## 2025-02-17 DIAGNOSIS — E11.9 DIET-CONTROLLED DIABETES MELLITUS (HCC): ICD-10-CM

## 2025-02-17 DIAGNOSIS — G56.03 BILATERAL CARPAL TUNNEL SYNDROME: ICD-10-CM

## 2025-02-17 DIAGNOSIS — Z82.49 FH: PREMATURE CORONARY HEART DISEASE: ICD-10-CM

## 2025-02-17 DIAGNOSIS — E03.9 HYPOTHYROIDISM, UNSPECIFIED TYPE: ICD-10-CM

## 2025-02-17 LAB
ALBUMIN SERPL-MCNC: 3.9 G/DL (ref 3.2–4.8)
ALBUMIN/GLOB SERPL: 1.2 {RATIO} (ref 1–2)
ALP LIVER SERPL-CCNC: 120 U/L
ALT SERPL-CCNC: 52 U/L
ANION GAP SERPL CALC-SCNC: 6 MMOL/L (ref 0–18)
AST SERPL-CCNC: 71 U/L (ref ?–34)
ATRIAL RATE: 58 BPM
BASOPHILS # BLD AUTO: 0.05 X10(3) UL (ref 0–0.2)
BASOPHILS NFR BLD AUTO: 0.9 %
BILIRUB SERPL-MCNC: 0.9 MG/DL (ref 0.3–1.2)
BUN BLD-MCNC: 5 MG/DL (ref 9–23)
BUN/CREAT SERPL: 6.2 (ref 10–20)
CALCIUM BLD-MCNC: 9.1 MG/DL (ref 8.7–10.4)
CHLORIDE SERPL-SCNC: 104 MMOL/L (ref 98–112)
CHOLEST SERPL-MCNC: 125 MG/DL (ref ?–200)
CO2 SERPL-SCNC: 32 MMOL/L (ref 21–32)
CREAT BLD-MCNC: 0.81 MG/DL
CREAT UR-SCNC: 172.6 MG/DL
DEPRECATED RDW RBC AUTO: 42.9 FL (ref 35.1–46.3)
EGFRCR SERPLBLD CKD-EPI 2021: 85 ML/MIN/1.73M2 (ref 60–?)
EOSINOPHIL # BLD AUTO: 0.26 X10(3) UL (ref 0–0.7)
EOSINOPHIL NFR BLD AUTO: 4.5 %
ERYTHROCYTE [DISTWIDTH] IN BLOOD BY AUTOMATED COUNT: 12.9 % (ref 11–15)
EST. AVERAGE GLUCOSE BLD GHB EST-MCNC: 146 MG/DL (ref 68–126)
FASTING PATIENT LIPID ANSWER: YES
FASTING STATUS PATIENT QL REPORTED: YES
GLOBULIN PLAS-MCNC: 3.2 G/DL (ref 2–3.5)
GLUCOSE BLD-MCNC: 128 MG/DL (ref 70–99)
HBA1C MFR BLD: 6.7 % (ref ?–5.7)
HCT VFR BLD AUTO: 46.6 %
HDLC SERPL-MCNC: 34 MG/DL (ref 40–59)
HGB BLD-MCNC: 15.7 G/DL
IMM GRANULOCYTES # BLD AUTO: 0.02 X10(3) UL (ref 0–1)
IMM GRANULOCYTES NFR BLD: 0.3 %
LDLC SERPL CALC-MCNC: 75 MG/DL (ref ?–100)
LYMPHOCYTES # BLD AUTO: 2.44 X10(3) UL (ref 1–4)
LYMPHOCYTES NFR BLD AUTO: 42.6 %
MCH RBC QN AUTO: 30.5 PG (ref 26–34)
MCHC RBC AUTO-ENTMCNC: 33.7 G/DL (ref 31–37)
MCV RBC AUTO: 90.7 FL
MICROALBUMIN UR-MCNC: 1.3 MG/DL
MICROALBUMIN/CREAT 24H UR-RTO: 7.5 UG/MG (ref ?–30)
MONOCYTES # BLD AUTO: 0.43 X10(3) UL (ref 0.1–1)
MONOCYTES NFR BLD AUTO: 7.5 %
NEUTROPHILS # BLD AUTO: 2.53 X10 (3) UL (ref 1.5–7.7)
NEUTROPHILS # BLD AUTO: 2.53 X10(3) UL (ref 1.5–7.7)
NEUTROPHILS NFR BLD AUTO: 44.2 %
NONHDLC SERPL-MCNC: 91 MG/DL (ref ?–130)
OSMOLALITY SERPL CALC.SUM OF ELEC: 293 MOSM/KG (ref 275–295)
P AXIS: 29 DEGREES
P-R INTERVAL: 148 MS
PLATELET # BLD AUTO: 196 10(3)UL (ref 150–450)
POTASSIUM SERPL-SCNC: 4.1 MMOL/L (ref 3.5–5.1)
PROT SERPL-MCNC: 7.1 G/DL (ref 5.7–8.2)
Q-T INTERVAL: 420 MS
QRS DURATION: 74 MS
QTC CALCULATION (BEZET): 412 MS
R AXIS: -7 DEGREES
RBC # BLD AUTO: 5.14 X10(6)UL
SODIUM SERPL-SCNC: 142 MMOL/L (ref 136–145)
T AXIS: 12 DEGREES
T4 FREE SERPL-MCNC: 0.9 NG/DL (ref 0.8–1.7)
TRIGL SERPL-MCNC: 83 MG/DL (ref 30–149)
TSI SER-ACNC: 6.23 UIU/ML (ref 0.55–4.78)
VENTRICULAR RATE: 58 BPM
VLDLC SERPL CALC-MCNC: 13 MG/DL (ref 0–30)
WBC # BLD AUTO: 5.7 X10(3) UL (ref 4–11)

## 2025-02-17 PROCEDURE — 99214 OFFICE O/P EST MOD 30 MIN: CPT | Performed by: FAMILY MEDICINE

## 2025-02-17 PROCEDURE — 84443 ASSAY THYROID STIM HORMONE: CPT | Performed by: FAMILY MEDICINE

## 2025-02-17 PROCEDURE — 93010 ELECTROCARDIOGRAM REPORT: CPT | Performed by: INTERNAL MEDICINE

## 2025-02-17 PROCEDURE — 82570 ASSAY OF URINE CREATININE: CPT | Performed by: FAMILY MEDICINE

## 2025-02-17 PROCEDURE — 80061 LIPID PANEL: CPT | Performed by: FAMILY MEDICINE

## 2025-02-17 PROCEDURE — 84439 ASSAY OF FREE THYROXINE: CPT | Performed by: FAMILY MEDICINE

## 2025-02-17 PROCEDURE — 82043 UR ALBUMIN QUANTITATIVE: CPT | Performed by: FAMILY MEDICINE

## 2025-02-17 PROCEDURE — 93005 ELECTROCARDIOGRAM TRACING: CPT

## 2025-02-17 PROCEDURE — 36415 COLL VENOUS BLD VENIPUNCTURE: CPT | Performed by: FAMILY MEDICINE

## 2025-02-17 PROCEDURE — 83036 HEMOGLOBIN GLYCOSYLATED A1C: CPT | Performed by: FAMILY MEDICINE

## 2025-02-17 PROCEDURE — 85025 COMPLETE CBC W/AUTO DIFF WBC: CPT | Performed by: FAMILY MEDICINE

## 2025-02-17 PROCEDURE — 80053 COMPREHEN METABOLIC PANEL: CPT | Performed by: FAMILY MEDICINE

## 2025-02-17 NOTE — PROGRESS NOTES
HPI:    Patient ID: Tiff Rapp is a 59 year old female.      Chest Pain   Pertinent negatives include no abdominal pain, cough, dizziness, fever, headaches, nausea, numbness, palpitations, shortness of breath, vomiting or weakness.   Pertinent negatives for past medical history include no seizures.       Chief Complaint   Patient presents with    Chest Pain     On and off sometimes shortness of breath for a few months     Complete Form     For dental procedure does not have an exact date until they know her A1C        Wt Readings from Last 6 Encounters:   02/17/25 226 lb (102.5 kg)   12/17/24 230 lb (104.3 kg)   08/21/24 220 lb (99.8 kg)   02/08/24 232 lb (105.2 kg)   10/03/23 232 lb (105.2 kg)   03/27/23 230 lb (104.3 kg)     BP Readings from Last 3 Encounters:   02/17/25 126/84   12/18/24 (!) 164/98   08/21/24 143/87     Patient here for follow up chest pain   On and off, substernal   Feels it moves  Goes to left chest sometimes.  Has had this pain in past.  Has seen cardiology  Sometimes gets shortness of breath     Has been to ER for this  Last visit 12/18/24  Dx: chest pain, costochondritis    Hx of chest pain for over a year  FH cad sister, father       Review of Systems   Constitutional:  Negative for chills and fever.   Eyes:  Negative for visual disturbance.   Respiratory:  Negative for cough, shortness of breath and wheezing.    Cardiovascular:  Positive for chest pain. Negative for palpitations and leg swelling.   Gastrointestinal:  Negative for abdominal pain, constipation, diarrhea, nausea and vomiting.   Genitourinary:  Negative for decreased urine volume and difficulty urinating.   Neurological:  Negative for dizziness, tremors, seizures, weakness, light-headedness, numbness and headaches.       /84   Pulse 62   Temp 97.4 °F (36.3 °C) (Temporal)   Ht 5' 6\" (1.676 m)   Wt 226 lb (102.5 kg)   LMP 02/11/2020   BMI 36.48 kg/m²          Current Outpatient Medications   Medication Sig  Dispense Refill    pregabalin 150 MG Oral Cap Take 1 capsule (150 mg total) by mouth 2 (two) times daily. 180 capsule 0    Lancets (ONETOUCH DELICA PLUS [id number]) Does not apply Misc        Allergies:Allergies[1]   PHYSICAL EXAM:     Chief Complaint   Patient presents with    Chest Pain     On and off sometimes shortness of breath for a few months     Complete Form     For dental procedure does not have an exact date until they know her A1C       Physical Exam  Vitals and nursing note reviewed.   Constitutional:       Appearance: She is well-developed.   Eyes:      Pupils: Pupils are equal, round, and reactive to light.   Neck:      Thyroid: No thyromegaly.   Cardiovascular:      Rate and Rhythm: Normal rate and regular rhythm.      Heart sounds: No murmur heard.  Pulmonary:      Effort: Pulmonary effort is normal.      Breath sounds: Normal breath sounds. No wheezing.   Abdominal:      Palpations: There is no mass.      Tenderness: There is no abdominal tenderness. There is no right CVA tenderness or left CVA tenderness.   Musculoskeletal:      Cervical back: Normal range of motion and neck supple.      Right lower leg: No edema.      Left lower leg: No edema.   Skin:     General: Skin is warm and dry.      Findings: No rash.   Neurological:      Mental Status: She is alert and oriented to person, place, and time.                ASSESSMENT/PLAN:     Encounter Diagnoses   Name Primary?    Chest wall pain, chronic Yes    Diet-controlled diabetes mellitus (HCC)     Hypothyroidism, unspecified type     Bilateral carpal tunnel syndrome     FH: premature coronary heart disease     Encounter for screening mammogram for breast cancer        1. Chest wall pain, chronic  On and off for a year  States it is reproducible  Multiple cardiac risk factors   To see Cardio  - CBC With Differential With Platelet  - EKG 12 Lead; Future  - [facility] CARDIOLOGY EXTERNAL    2. Diet-controlled diabetes mellitus (HCC)    -  Hemoglobin A1C  - Comp Metabolic Panel (14)  - Microalb/Creat Ratio, Random Urine  - Lipid Panel  - [facility] CARDIOLOGY EXTERNAL    3. Hypothyroidism, unspecified type    - TSH W Reflex To Free T4  - T4, FREE (S)    4. Bilateral carpal tunnel syndrome  Unchanged  On lyrica    5. FH: premature coronary heart disease    - [facility] CARDIOLOGY EXTERNAL    6. Encounter for screening mammogram for breast cancer    - [facility] DIONNE 2D+3D SCREENING BILAT (CPT=77067/49392); Future      Orders Placed This Encounter   Procedures    Hemoglobin A1C    Comp Metabolic Panel (14)    TSH W Reflex To Free T4    Microalb/Creat Ratio, Random Urine    Lipid Panel    CBC With Differential With Platelet    T4, FREE (S)         The above note was creating using Dragon speech recognition technology. Please excuse any typos    Meds This Visit:  Requested Prescriptions      No prescriptions requested or ordered in this encounter       Imaging & Referrals:  [facility] CARDIOLOGY EXTERNAL  [facility] DIONNE 2D+3D SCREENING BILAT (CPT=77067/30480)       ID#[id number]       [1] No Known Allergies

## 2025-02-23 DIAGNOSIS — R74.8 ELEVATED LIVER ENZYMES: Primary | ICD-10-CM

## 2025-02-23 DIAGNOSIS — E03.9 HYPOTHYROIDISM, UNSPECIFIED TYPE: ICD-10-CM

## 2025-02-23 RX ORDER — LEVOTHYROXINE SODIUM 25 UG/1
25 TABLET ORAL
Qty: 90 TABLET | Refills: 0 | Status: SHIPPED | OUTPATIENT
Start: 2025-02-23 | End: 2025-02-24

## 2025-02-24 DIAGNOSIS — E03.9 HYPOTHYROIDISM, UNSPECIFIED TYPE: ICD-10-CM

## 2025-02-24 RX ORDER — LEVOTHYROXINE SODIUM 25 UG/1
25 TABLET ORAL
Qty: 90 TABLET | Refills: 0 | Status: SHIPPED | OUTPATIENT
Start: 2025-02-24

## 2025-03-03 ENCOUNTER — OFFICE VISIT (OUTPATIENT)
Dept: FAMILY MEDICINE CLINIC | Facility: CLINIC | Age: 60
End: 2025-03-03
Payer: MEDICAID

## 2025-03-03 ENCOUNTER — LAB ENCOUNTER (OUTPATIENT)
Dept: LAB | Age: 60
End: 2025-03-03
Attending: FAMILY MEDICINE
Payer: MEDICAID

## 2025-03-03 VITALS
SYSTOLIC BLOOD PRESSURE: 127 MMHG | BODY MASS INDEX: 37.15 KG/M2 | WEIGHT: 223 LBS | TEMPERATURE: 96 F | DIASTOLIC BLOOD PRESSURE: 84 MMHG | HEIGHT: 65 IN | HEART RATE: 70 BPM

## 2025-03-03 DIAGNOSIS — E03.9 HYPOTHYROIDISM, UNSPECIFIED TYPE: Primary | ICD-10-CM

## 2025-03-03 DIAGNOSIS — Z01.818 PREOPERATIVE CLEARANCE: ICD-10-CM

## 2025-03-03 DIAGNOSIS — R79.89 ELEVATED LFTS: ICD-10-CM

## 2025-03-03 DIAGNOSIS — Z12.11 COLON CANCER SCREENING: ICD-10-CM

## 2025-03-03 LAB
HAV IGM SER QL: NONREACTIVE
HBV CORE IGM SER QL: NONREACTIVE
HBV SURFACE AG SERPL QL IA: NONREACTIVE

## 2025-03-03 PROCEDURE — 80074 ACUTE HEPATITIS PANEL: CPT | Performed by: FAMILY MEDICINE

## 2025-03-03 PROCEDURE — 87522 HEPATITIS C REVRS TRNSCRPJ: CPT | Performed by: FAMILY MEDICINE

## 2025-03-03 PROCEDURE — 36415 COLL VENOUS BLD VENIPUNCTURE: CPT | Performed by: FAMILY MEDICINE

## 2025-03-03 PROCEDURE — 87902 NFCT AGT GNTYP ALYS HEP C: CPT | Performed by: FAMILY MEDICINE

## 2025-03-03 NOTE — PROGRESS NOTES
HPI:    Patient ID: Tiff Rapp is a 59 year old female.      HPI    Chief Complaint   Patient presents with    Follow - Up     On chest pain feeling better but still has the pain on and off     Lab Results     Follow up          Wt Readings from Last 6 Encounters:   03/03/25 223 lb (101.2 kg)   02/17/25 226 lb (102.5 kg)   12/17/24 230 lb (104.3 kg)   08/21/24 220 lb (99.8 kg)   02/08/24 232 lb (105.2 kg)   10/03/23 232 lb (105.2 kg)     BP Readings from Last 3 Encounters:   03/03/25 127/84   02/17/25 126/84   12/18/24 (!) 164/98     Has appointment to see cardiology 3/20  Has liver us scheduled for 3/7/25    Resumed  levothyroxine 25 mcg 1 week ago.    Last labs reviewed  Diabetes mellitus well controlled.  Tsh was elevated and so were liver enzymes   These have been elevated for sometime  Patient denies any alcohol/ drug use.        Review of Systems   Constitutional:  Negative for chills and fever.   Eyes:  Negative for visual disturbance.   Respiratory:  Negative for cough, shortness of breath and wheezing.    Cardiovascular:  Positive for chest pain. Negative for palpitations and leg swelling.        Chest pain better    Genitourinary:  Negative for decreased urine volume and difficulty urinating.   Neurological:  Negative for dizziness, tremors, seizures, weakness, light-headedness, numbness and headaches.       /84   Pulse 70   Temp (!) 96.4 °F (35.8 °C) (Temporal)   Ht 5' 5\" (1.651 m)   Wt 223 lb (101.2 kg)   LMP 02/11/2020   BMI 37.11 kg/m²          Current Outpatient Medications   Medication Sig Dispense Refill    levothyroxine 25 MCG Oral Tab Take 1 tablet (25 mcg total) by mouth before breakfast. 90 tablet 0    pregabalin 150 MG Oral Cap Take 1 capsule (150 mg total) by mouth 2 (two) times daily. 180 capsule 0     Allergies:Allergies[1]   PHYSICAL EXAM:     Chief Complaint   Patient presents with    Follow - Up     On chest pain feeling better but still has the pain on and off     Lab  Results     Follow up         Physical Exam  Vitals and nursing note reviewed.   Constitutional:       Appearance: She is well-developed.   Eyes:      Pupils: Pupils are equal, round, and reactive to light.   Neck:      Thyroid: No thyromegaly.   Cardiovascular:      Rate and Rhythm: Normal rate and regular rhythm.      Heart sounds: No murmur heard.  Pulmonary:      Effort: Pulmonary effort is normal.      Breath sounds: Normal breath sounds. No wheezing.   Abdominal:      Palpations: There is no mass.      Tenderness: There is no abdominal tenderness. There is no right CVA tenderness or left CVA tenderness.   Musculoskeletal:      Cervical back: Normal range of motion and neck supple.      Right lower leg: No edema.      Left lower leg: No edema.   Skin:     General: Skin is warm and dry.      Findings: No rash.   Neurological:      Mental Status: She is alert and oriented to person, place, and time.       Bilateral barefoot skin diabetic exam is normal, visualized feet and the appearance is normal.  Bilateral monofilament/sensation of both feet is normal.  Pulsation pedal pulse exam of both lower legs/feet is normal as well.             ASSESSMENT/PLAN:     Encounter Diagnoses   Name Primary?    Hypothyroidism, unspecified type Yes    Elevated LFTs     Colon cancer screening     Preoperative clearance        1. Hypothyroidism, unspecified type  Resumed medication  Recheck tsh 6-8 weks  - TSH W Reflex To Free T4; Future    2. Elevated LFTs  Follow up GI  Complete hep panel   - Gastro Referral - In Network    3. Colon cancer screening    - Gastro Referral - In Network    4. Procedure clearance  Dental form completed and faxed      Orders Placed This Encounter   Procedures    TSH W Reflex To Free T4         The above note was creating using Dragon speech recognition technology. Please excuse any typos    Meds This Visit:  Requested Prescriptions      No prescriptions requested or ordered in this encounter        Imaging & Referrals:  [facility] - INTERNAL       ID#[id number]       [1] No Known Allergies

## 2025-03-06 LAB
HCV LOG10 2: 6.32 LOG10 IU/ML
HEP C QN 2: NORMAL IU/ML

## 2025-03-12 ENCOUNTER — TELEPHONE (OUTPATIENT)
Dept: FAMILY MEDICINE CLINIC | Facility: CLINIC | Age: 60
End: 2025-03-12

## 2025-03-12 NOTE — TELEPHONE ENCOUNTER
Left voice message to call office back.  Office phone number provided with office telephone hours.    Is patient asking about Hepatitis C or B?    Patient has US liver scheduled as below    Future Appointments   Date Time Provider Department Center   3/22/2025  1:40 PM [facility] RM1 EH [facility]   4/11/2025  7:15 AM BK  RM1 BK  [facility]   4/15/2025 10:30 AM [facility]

## 2025-03-22 ENCOUNTER — HOSPITAL ENCOUNTER (OUTPATIENT)
Dept: MAMMOGRAPHY | Facility: HOSPITAL | Age: 60
Discharge: HOME | End: 2025-03-22
Attending: FAMILY MEDICINE
Payer: MEDICAID

## 2025-03-22 DIAGNOSIS — Z12.31 ENCOUNTER FOR SCREENING MAMMOGRAM FOR BREAST CANCER: ICD-10-CM

## 2025-03-22 PROCEDURE — 77067 SCR MAMMO BI INCL CAD: CPT | Performed by: FAMILY MEDICINE

## 2025-03-22 PROCEDURE — 77063 BREAST TOMOSYNTHESIS BI: CPT | Performed by: FAMILY MEDICINE

## 2025-03-26 ENCOUNTER — HOSPITAL ENCOUNTER (OUTPATIENT)
Dept: ULTRASOUND IMAGING | Facility: HOSPITAL | Age: 60
Discharge: HOME | End: 2025-03-26
Attending: FAMILY MEDICINE
Payer: MEDICAID

## 2025-03-26 DIAGNOSIS — R74.8 ELEVATED LIVER ENZYMES: ICD-10-CM

## 2025-03-26 PROCEDURE — 76705 ECHO EXAM OF ABDOMEN: CPT | Performed by: FAMILY MEDICINE

## 2025-04-15 ENCOUNTER — OFFICE VISIT (OUTPATIENT)
Dept: GASTROENTEROLOGY | Facility: CLINIC | Age: 60
End: 2025-04-15

## 2025-04-15 ENCOUNTER — TELEPHONE (OUTPATIENT)
Dept: GASTROENTEROLOGY | Facility: CLINIC | Age: 60
End: 2025-04-15

## 2025-04-15 VITALS
BODY MASS INDEX: 37.65 KG/M2 | WEIGHT: 226 LBS | SYSTOLIC BLOOD PRESSURE: 120 MMHG | DIASTOLIC BLOOD PRESSURE: 84 MMHG | HEIGHT: 65 IN

## 2025-04-15 DIAGNOSIS — Z12.11 COLON CANCER SCREENING: Primary | ICD-10-CM

## 2025-04-15 DIAGNOSIS — R13.10 DYSPHAGIA, UNSPECIFIED TYPE: ICD-10-CM

## 2025-04-15 DIAGNOSIS — Z12.11 SCREEN FOR COLON CANCER: Primary | ICD-10-CM

## 2025-04-15 DIAGNOSIS — R63.4 WEIGHT LOSS: ICD-10-CM

## 2025-04-15 DIAGNOSIS — R10.84 GENERALIZED ABDOMINAL PAIN: ICD-10-CM

## 2025-04-15 PROCEDURE — 99204 OFFICE O/P NEW MOD 45 MIN: CPT | Performed by: STUDENT IN AN ORGANIZED HEALTH CARE EDUCATION/TRAINING PROGRAM

## 2025-04-15 NOTE — H&P
[facility] - Gastroenterology                                                                                                               Reason for consult: Elevated liver enzymes, colon cancer screening    Requesting physician or provider: Fabio Guallpa MD    Chief Complaint   Patient presents with    Consult     Elevated liver enzymes      HPI:   Tiff Rapp is a 59 year old year-old woman with history of uterine fibroids who presents to GI clinic with elevated liver enzymes, abdominal pain and to discuss colon cancer screening.    Upon review of the patient's chart, her AST/ALT have been elevated since February 2022.  Most recently she was being seen by her primary care physician where a hepatitis panel was ordered and she was found to have hepatitis C.    She was found to have hepatitis C genotype Ia.    Ultrasound of the liver was done and showed a normal-appearing liver with a simple appearing cyst.    Denies history of IV drug use.  Denies history of blood transfusion.  Denies knowing anyone else with hepatitis C.  She does have a tattoo and she did get this greater than 30 years ago.    No first-degree relatives with a colon cancer.  Has regular well-formed bowel movements and denies presence of blood in the stool.  She does complain of intermittent dysphagia for many years.  She also has chronic abdominal discomfort.  Symptoms are typically worse after eating.    She did have gallstones seen on recent ultrasound of the right upper quadrant but had a negative sonographic Dillon sign.    Prior endoscopies:  None    Soc:  -No smoking  -No Etoh    Wt Readings from Last 6 Encounters:   04/15/25 226 lb (102.5 kg)   03/03/25 223 lb (101.2 kg)   02/17/25 226 lb (102.5 kg)   12/17/24 230 lb (104.3 kg)   08/21/24 220 lb (99.8 kg)   02/08/24 232 lb (105.2 kg)     History, Medications, Allergies, ROS:      Past  Medical History[1]   Past Surgical History[2]   Family Hx: Family History[3]   Social History: Short Social Hx on File[4]     Medications (Active prior to today's visit):  Current Medications[5]    Allergies:  Allergies[6]    ROS:   CONSTITUTIONAL:  negative for fevers, rigors  EYES:  negative for diplopia   RESPIRATORY:  negative for severe shortness of breath  CARDIOVASCULAR:  negative for crushing sub-sternal chest pain  GASTROINTESTINAL:  see HPI  GENITOURINARY:  negative for dysuria or gross hematuria  INTEGUMENT/BREAST:  SKIN:  negative for jaundice   ALLERGIC/IMMUNOLOGIC:  negative for hay fever  ENDOCRINE:  negative for cold intolerance and heat intolerance  MUSCULOSKELETAL:  negative for joint effusion/severe erythema  BEHAVIOR/PSYCH:  negative for psychotic behavior      PHYSICAL EXAM:   Blood pressure 120/84, height 5' 5\" (1.651 m), weight 226 lb (102.5 kg), last menstrual period 02/11/2020.    Gen- Patient appears comfortable and in no acute discomfort  HEENT: the sclera appears anicteric, oropharynx clear, mucus membranes appear moist  CV- regular rate and rhythm, the extremities are warm and well perfused   Lung- Moves air well; No labored breathing  Abdomen- soft, non-tender exam in all quadrants without rigidity or guarding, non-distended  Skin- No jaundice  Ext: no edema is evident.   Neuro- Alert and interactive, and gross movements of extremities normal  Psych - appropriate, non-agitated    Labs/Imaging:     Patient's pertinent labs and imaging were reviewed and discussed with patient today.      ASSESSMENT/PLAN:   Tiff Rapp is a 59 year old year-old woman with history of uterine fibroids who presents to GI clinic with elevated liver enzymes, abdominal pain and to discuss colon cancer screening.    #Colon cancer screening  -Due at this time.  -Will schedule    #Dysphagia  -Many year history of intermittent episodes of food getting \"stuck.\"  -Denies chest discomfort, nausea, vomiting.  -No  prior EGD.  -Denies significant symptoms of heartburn/food regurgitation.  -Given chronicity of symptoms, will further evaluate with an EGD with esophageal biopsies.    #Elevated liver enzymes  -US liver normal appearing liver, does show benign appearing cyst.  -Hepatitis panel with Hepatitis C reactive, genotype 1a seen on further testing.  -Does have a tattoo from 30 years ago  -No prior IV drug use, no hx of blood transfusions.  -Has risk factors for metabolic associated disease.  -Will refer to hepatology for management of hepatitis C    #Abdominal discomfort  -Typically after eating.  -Does have gallstones on imaging - could consider gallbladder as etiology.  -However, given chronicity of symptoms will proceed with CT A/P and EGD as outlined above.  -May be visceral hypersensitivity.    Recommendations:  Schedule CT scan of your abdomen/pelvis  Schedule appointment with hepatologist for management of hepatitis C.    1. Schedule colonoscopy with MAC [Diagnosis: colon cancer screening] AND EGD with [facility] [Diagnosis: dysphagia]     2.  bowel prep from pharmacy (split dose golytely)    3. Continue all medications for procedure    Orders This Visit:  No orders of the defined types were placed in this encounter.      Meds This Visit:  Requested Prescriptions      No prescriptions requested or ordered in this encounter     Imaging & Referrals:  None     Ramon Andujar MD  [facility] Gastroenterology  4/15/2025      This note was partially prepared using Dragon Medical voice recognition dictation software. As a result, errors may occur. When identified, these errors have been corrected. While every attempt is made to correct errors during dictation, discrepancies may still exist.          [1]   Past Medical History:   Carpal tunnel syndrome of left wrist    Left Endoscopic Carpal Tunnel Release     Carpal tunnel syndrome on right    Chalazion left upper eyelid    Diabetes (HCC)    History of blood  transfusion    @ [facility] over 20 yrs; no reactions    Migraine    Osteoarthritis    Problems with swallowing    upper dentures    Thyroid disease    Visual impairment    wears glases   [2]   Past Surgical History:  Procedure Laterality Date          Wrist arthroscop,release xvers lig Right 01/15/2021    right endoscopic carpal tunnel release     Wrist arthroscop,release xvers lig Left 2021    Left Endoscopic Carpal Tunnel Release    [3]   Family History  Problem Relation Age of Onset    Breast Cancer Mother 63    Diabetes Mother     Heart Disorder Father     Cancer Maternal Grandmother 60        unknown primary    Cancer Brother 50        throat/lung ca (smoker)    Diabetes Brother     Breast Cancer Maternal Cousin Female 38    Cancer Maternal Uncle 60        colon ca    Breast Cancer Paternal Cousin Female 51    Macular degeneration Neg     Glaucoma Neg    [4]   Social History  Socioeconomic History    Marital status: Single   Tobacco Use    Smoking status: Never    Smokeless tobacco: Never   Vaping Use    Vaping status: Never Used   Substance and Sexual Activity    Alcohol use: Never    Drug use: Never   Other Topics Concern    Right Handed Yes     Social Drivers of Health     Food Insecurity: Low Risk  (2022)    Received from [facility]    Food Insecurity     Have there been times that your food ran out, and you didn't have money to get more?: No     Are there times that you worry that this might happen?: No   Transportation Needs: Low Risk  (2022)    Received from [facility]    Transportation Needs     Do you have trouble getting transportation to medical appointments?: No    Received from [facility]   [5]   Current Outpatient Medications   Medication Sig Dispense Refill    levothyroxine 25 MCG Oral Tab Take 1 tablet (25 mcg total) by mouth before breakfast. 90 tablet 0    pregabalin 150 MG Oral Cap Take 1 capsule (150 mg  total) by mouth 2 (two) times daily. 180 capsule 0   [6] No Known Allergies

## 2025-04-15 NOTE — PATIENT INSTRUCTIONS
Schedule CT scan of your abdomen/pelvis  Schedule appointment with hepatologist for management of hepatitis C.    1. Schedule colonoscopy with MAC [Diagnosis: colon cancer screening] AND EGD with MAC [Diagnosis: dysphagia]     2.  bowel prep from pharmacy (split dose golytely)    3. Continue all medications for procedure    4. Read all bowel prep instructions carefully    5. AVOID seeds, nuts, popcorn, raw fruits and vegetables (cooked is okay) for 2-3 days before procedure    6. If you start any NEW medication after your visit today, please notify us. Certain medications will need to be held before the procedure, or the procedure cannot be performed.

## 2025-04-15 NOTE — TELEPHONE ENCOUNTER
Scheduled for:  Colonoscopy 23616 & EGD 90226  Provider Name:  Dr. Andujar  Date:  5/31/2025  Location:  [facility]  Sedation:  MAC  Time:  7:30 am - Patient is aware he/she will receive a phone call the day before with the arrival time.  Prep:  Golytely  Meds/Allergies Reconciled?:  Physician reviewed  Diagnosis with codes:  Screening for colon cancer Z12.11; Dysphagia R13.10  Was patient informed to call insurance with codes (Y/N):  Yes  Referral sent?:  Referral was sent at the time of electronic surgical scheduling.  EM or [facility] notified?:  I sent an electronic request to Endo Scheduling and received a confirmation today.    Medication Orders:  N/A  Misc Orders:  N/A     Further instructions given by staff:  Prep instructions were given to pt in the office, pt verbalized understanding.

## 2025-05-16 ENCOUNTER — TELEPHONE (OUTPATIENT)
Facility: CLINIC | Age: 60
End: 2025-05-16

## 2025-05-16 NOTE — TELEPHONE ENCOUNTER
1st,overdue reminder letter mailed out to patient    Imaging order from 4/15/2025  CT ABDOMEN+PELVIS(CONTRAST ONLY)(CPT=74177) (Order #[id number]) on 4/15/25

## 2025-05-17 ENCOUNTER — TELEPHONE (OUTPATIENT)
Dept: FAMILY MEDICINE CLINIC | Facility: CLINIC | Age: 60
End: 2025-05-17

## 2025-05-17 DIAGNOSIS — E03.9 HYPOTHYROIDISM, UNSPECIFIED TYPE: ICD-10-CM

## 2025-05-17 DIAGNOSIS — G56.03 CARPAL TUNNEL SYNDROME, BILATERAL UPPER LIMBS: ICD-10-CM

## 2025-05-18 DIAGNOSIS — E03.9 HYPOTHYROIDISM, UNSPECIFIED TYPE: ICD-10-CM

## 2025-05-20 RX ORDER — LEVOTHYROXINE SODIUM 25 UG/1
25 TABLET ORAL
Qty: 30 TABLET | Refills: 0 | Status: SHIPPED | OUTPATIENT
Start: 2025-05-20

## 2025-05-20 RX ORDER — LEVOTHYROXINE SODIUM 25 UG/1
25 TABLET ORAL
Qty: 90 TABLET | Refills: 0 | OUTPATIENT
Start: 2025-05-20

## 2025-05-20 RX ORDER — PREGABALIN 150 MG/1
150 CAPSULE ORAL 2 TIMES DAILY
Qty: 180 CAPSULE | Refills: 0 | Status: SHIPPED | OUTPATIENT
Start: 2025-05-20

## 2025-05-20 NOTE — TELEPHONE ENCOUNTER
Please Review. Protocol Failed; No Protocol     Requested Prescriptions   Pending Prescriptions Disp Refills    levothyroxine 25 MCG Oral Tab 90 tablet 0     Sig: Take 1 tablet (25 mcg total) by mouth before breakfast.       Thyroid Medication Protocol Failed - 5/20/2025  9:25 AM        Failed - Last TSH value is normal     Lab Results   Component Value Date    TSH 6.233 (H) 02/17/2025    TSHT4 28.15 (H) 09/16/2024

## 2025-05-20 NOTE — TELEPHONE ENCOUNTER
Refill Request    Medication request: pregabalin 150 MG Oral Cap Take 1 capsule (150 mg total) by mouth 2 (two) times daily.     LOV:Visit date not found   Due back to clinic per last office note:  \"Recommend that she follow-up after surgical evaluation. \"  NOV: Visit date not found      [place] 1/3/25 #180 - 90 day supply    Urine drug screen (if applicable): n/a  Pain contract: n/a    LOV plan (if weaning or changing medications): Per  at last office visit: \"Recommend she continue Lyrica 150 mg twice daily. \"

## 2025-05-22 NOTE — TELEPHONE ENCOUNTER
Patient has not read my chart message sent on 5/20/25. Attempted to call patient, message left to call Dr. Guallpa's office.

## 2025-05-22 NOTE — TELEPHONE ENCOUNTER
Patient returned call. Informed rx was approved for 1 month supply but is overdue for thyroid lab test. Lab will be required for additional refills. Advised to also schedule appointment as she is overdue. Patient voiced agreement/understanding.

## 2025-05-31 ENCOUNTER — HOSPITAL ENCOUNTER (OUTPATIENT)
Facility: HOSPITAL | Age: 60
Setting detail: HOSPITAL OUTPATIENT SURGERY
Discharge: HOME | End: 2025-05-31
Attending: STUDENT IN AN ORGANIZED HEALTH CARE EDUCATION/TRAINING PROGRAM | Admitting: STUDENT IN AN ORGANIZED HEALTH CARE EDUCATION/TRAINING PROGRAM
Payer: MEDICAID

## 2025-05-31 ENCOUNTER — ANESTHESIA (OUTPATIENT)
Dept: ENDOSCOPY | Facility: HOSPITAL | Age: 60
End: 2025-05-31
Payer: MEDICAID

## 2025-05-31 ENCOUNTER — ANESTHESIA EVENT (OUTPATIENT)
Dept: ENDOSCOPY | Facility: HOSPITAL | Age: 60
End: 2025-05-31
Payer: MEDICAID

## 2025-05-31 VITALS
WEIGHT: 226 LBS | HEART RATE: 61 BPM | TEMPERATURE: 98 F | SYSTOLIC BLOOD PRESSURE: 181 MMHG | RESPIRATION RATE: 17 BRPM | HEIGHT: 65 IN | BODY MASS INDEX: 37.65 KG/M2 | DIASTOLIC BLOOD PRESSURE: 90 MMHG | OXYGEN SATURATION: 98 %

## 2025-05-31 DIAGNOSIS — R13.10 DYSPHAGIA, UNSPECIFIED TYPE: ICD-10-CM

## 2025-05-31 DIAGNOSIS — Z12.11 SCREEN FOR COLON CANCER: ICD-10-CM

## 2025-05-31 LAB — GLUCOSE BLDC GLUCOMTR-MCNC: 124 MG/DL (ref 70–99)

## 2025-05-31 PROCEDURE — 45378 DIAGNOSTIC COLONOSCOPY: CPT | Performed by: STUDENT IN AN ORGANIZED HEALTH CARE EDUCATION/TRAINING PROGRAM

## 2025-05-31 PROCEDURE — 43239 EGD BIOPSY SINGLE/MULTIPLE: CPT | Performed by: STUDENT IN AN ORGANIZED HEALTH CARE EDUCATION/TRAINING PROGRAM

## 2025-05-31 RX ORDER — SODIUM CHLORIDE, SODIUM LACTATE, POTASSIUM CHLORIDE, CALCIUM CHLORIDE 600; 310; 30; 20 MG/100ML; MG/100ML; MG/100ML; MG/100ML
INJECTION, SOLUTION INTRAVENOUS CONTINUOUS
Status: DISCONTINUED | OUTPATIENT
Start: 2025-05-31 | End: 2025-05-31

## 2025-05-31 RX ORDER — GLYCOPYRROLATE 0.2 MG/ML
INJECTION, SOLUTION INTRAMUSCULAR; INTRAVENOUS AS NEEDED
Status: DISCONTINUED | OUTPATIENT
Start: 2025-05-31 | End: 2025-05-31 | Stop reason: SURG

## 2025-05-31 RX ORDER — OMEPRAZOLE 40 MG/1
40 CAPSULE, DELAYED RELEASE ORAL
Qty: 90 CAPSULE | Refills: 0 | Status: SHIPPED | OUTPATIENT
Start: 2025-05-31 | End: 2025-08-29

## 2025-05-31 RX ORDER — NALOXONE HYDROCHLORIDE 0.4 MG/ML
0.08 INJECTION, SOLUTION INTRAMUSCULAR; INTRAVENOUS; SUBCUTANEOUS ONCE AS NEEDED
Status: DISCONTINUED | OUTPATIENT
Start: 2025-05-31 | End: 2025-05-31

## 2025-05-31 RX ADMIN — SODIUM CHLORIDE, SODIUM LACTATE, POTASSIUM CHLORIDE, CALCIUM CHLORIDE: 600; 310; 30; 20 INJECTION, SOLUTION INTRAVENOUS at 07:24:00

## 2025-05-31 RX ADMIN — GLYCOPYRROLATE 0.2 MG: 0.2 INJECTION, SOLUTION INTRAMUSCULAR; INTRAVENOUS at 08:11:00

## 2025-05-31 NOTE — ANESTHESIA POSTPROCEDURE EVALUATION
Patient: Tiff Rapp    Procedure Summary       Date: 05/31/25 Room / Location: [facility] ENDOSCOPY 01 / EM ENDOSCOPY    Anesthesia Start: 0723 Anesthesia Stop:     Procedures:       COLONOSCOPY-SCREENING      ESOPHAGOGASTRODUODENOSCOPY (EGD) Diagnosis:       Screen for colon cancer      Dysphagia, unspecified type      (Fair prep, hemorrhoids, hiatal hernia)    Surgeons: Ramon Andujar MD Anesthesiologist: Manuel Aguilar MD    Anesthesia Type: MAC ASA Status: 3            Anesthesia Type: MAC    Vitals Value Taken Time   /76 05/31/25 08:12   Temp 97.6 °F (36.4 °C) 05/31/25 08:12   Pulse 78 05/31/25 08:12   Resp 14 05/31/25 08:12   SpO2 98 % 05/31/25 08:12       [facility] AN Post Evaluation:   Patient Evaluated in PACU  Patient Participation: complete - patient participated  Level of Consciousness: awake  Pain Management: adequate  Airway Patency:patent  Dental exam unchanged from preop  Yes    Cardiovascular Status: acceptable  Respiratory Status: acceptable  Postoperative Hydration acceptable      Manuel Aguilar MD  5/31/2025 8:13 AM

## 2025-05-31 NOTE — OPERATIVE REPORT
ESOPHAGOGASTRODUODENOSCOPY (EGD) & COLONOSCOPY REPORT    Tiff Rapp     1965 Age 59 year old   PCP Fabio Guallpa MD Endoscopist Ramon Andujar MD       Date of procedure: 25    Procedure: EGD w/ biopsies & Colonoscopy    Pre-operative diagnosis: GERD, epigastric discomfort, screening    Post-operative diagnosis: see impression    Medications: MAC    Withdrawal time: 17 minutes    Complications: none    Procedure: Informed consent was obtained from the patient after the risks of the procedure were discussed, including but not limited to bleeding, perforation, aspiration, infection, or possibility of a missed lesion. We discussed the risks/benefits and alternatives to this procedure, as well as the planned sedation.     Colonoscopy procedure: Once an adequate level of sedation was obtained a digital rectal exam was completed. Then the lubricated tip of the Tojszwc-[id number]-158 diagnostic video colonoscope was inserted and advanced without difficulty to the cecum using the CO2 insufflation technique. The cecum was identified by localizing the trifold, the appendix and the ileocecal valve. A routine second examination of the cecum/ascending colon was performed. Withdrawal was begun with thorough washing and careful examination of the colonic walls and folds. Photodocumentation was obtained. The bowel prep was fair. Views of the colon were fair with washing. I then carefully withdrew the instrument from the patient who tolerated the procedure well.     EGD procedure: The patient was placed in the left lateral decubitus position and begun on continuous blood pressure pulse oximetry and EKG monitoring and this was maintained throughout the procedure. Once an adequate level of sedation was obtained a bite block was placed. Then the lubricated tip of the Bgqizdr-FPW-127 diagnostic video upper endoscope was inserted and advanced using direct visualization into the posterior pharynx and ultimately  into the esophagus with  distal extent of the second portion of the duodenum.     Complications: None    Colonoscopy findings:    1. No polyps detected.  2. Diverticulosis: no large diverticula appreciated.  3. Ileocecal valve appeared normal.  4. There was stool scattered throughout the colon that was able to be irrigated/suctioned for the most part, small/flat polyps may have been missed. Otherwise, the colonic mucosa throughout the colon showed normal vascular pattern, without evidence of angioectasias or inflammation.   5. A retroflexed view of the rectum revealed small internal hemorrhoids.  6. ISABELLE: normal rectal tone, no masses palpated.     Colonoscopy Impression:  No polyps detected.  Fair bowel prep.  Hemorrhoids.  Colon was otherwise normal with glistening mucosa and intact vascular pattern throughout.    EGD findings:      1. Esophagus: The squamocolumnar junction was noted at 35 cm and appeared regular. The diaphragmatic pinch was noted noted at 38 cm from the incisors. 3 cm hiatal hernia. The esophageal mucosa appeared healthy and normal. There was no evidence of esophagitis, stricture or endoscopic evidence of Alcantara's esophagus. Biopsies taken with cold forceps for histology.  2. Stomach: The stomach distended normally. Normal rugal folds were seen. The pylorus was patent. Retroflexion revealed a normal fundus. The gastric mucosa appeared healthy and normal. Biopsies were taken with a cold forceps from the antrum, incisura and body for histology.   3. Duodenum: The duodenal mucosa appeared normal in the bulb and 2nd portion of the duodenum.     EGD Impression:  -Esophagus: 3 cm hiatal hernia, normal appearing esophagus.   -Stomach: Unremarkable. Biopsied.  -Duodenum: Unremarkable.     Recommend:  Await pathology.   Repeat colonoscopy 1 year for screening purposes given fair bowel prep.  Start Omeprazole 40mg daily and take 30 to 60 minutes before either breakfast or dinner.    >>>If tissue was  obtained and you have not received your pathology results either by phone or letter within 2 weeks, please call our office at [phone].    Specimens: gastric, esophagus.    Blood loss: <1 ml

## 2025-05-31 NOTE — ANESTHESIA PREPROCEDURE EVALUATION
Anesthesia PreOp Note    HPI:     Tiff Rapp is a 59 year old female who presents for preoperative consultation requested by: Ramon Andujar MD    Date of Surgery: 5/31/2025    Procedure(s):  COLONOSCOPY-SCREENING  ESOPHAGOGASTRODUODENOSCOPY (EGD)  Indication: Screen for colon cancer / Dysphagia, unspecified type    Relevant Problems   No relevant active problems       NPO:  Last Liquid Consumption Date: 05/31/25  Last Liquid Consumption Time: 0000  Last Solid Consumption Date: 05/30/25  Last Solid Consumption Time: 0400  Last Liquid Consumption Date: 05/31/25          History Review:  Patient Active Problem List    Diagnosis Date Noted    Vitamin B12 deficiency 10/03/2023    Fatty liver 10/04/2022    Hypothyroidism 10/04/2022    Vitamin D deficiency 10/04/2022    Fatigue 10/04/2022    Daytime sleepiness 09/17/2022    Diet-controlled diabetes mellitus (HCC) 03/23/2022    Myopia of both eyes with regular astigmatism and presbyopia 03/23/2022    Chalazion left upper eyelid 03/23/2022    Elevated LFTs 02/01/2022    Oxygen dependent 02/01/2022    Uncontrolled type 2 diabetes mellitus, without long-term current use of insulin 01/26/2022    Acute respiratory distress syndrome (ARDS) due to COVID-19 virus (HCC) 01/26/2022    Bilateral hand pain 11/08/2021    Family history of premature CAD 06/05/2021    Plantar fasciitis, bilateral 06/05/2021    Observation for suspected condition     FHx: colon cancer 01/13/2021    FH: breast cancer in first degree relative 11/19/2020    Obesity (BMI 30-39.9) 11/19/2020    Abnormal CT of the abdomen 11/19/2020    Hiatal hernia without gangrene or obstruction 11/19/2020    Bilateral carpal tunnel syndrome 09/29/2020       Past Medical History[1]    Past Surgical History[2]    Prescriptions Prior to Admission[3]  Current Medications and Prescriptions Ordered in Epic[4]    Allergies[5]    Family History[6]  Social Hx on file[7]    Available pre-op labs reviewed.              Vital Signs:  Body mass index is 37.61 kg/m².   height is 1.651 m (5' 5\") and weight is 102.5 kg (226 lb).   Vitals:    25 1229   Weight: 102.5 kg (226 lb)   Height: 1.651 m (5' 5\")        Anesthesia Evaluation     Patient summary reviewed and Nursing notes reviewed    Airway   Mallampati: II  TM distance: >3 FB  Neck ROM: full  Dental      Pulmonary - normal exam   (+) shortness of breath    ROS comment: Acc to pt had bad Covid infection, was on home O2, off it for two years now  Cardiovascular - negative ROS and normal exam    Neuro/Psych    (+)  neuromuscular disease,        GI/Hepatic/Renal    (+) liver disease    Endo/Other    (+) diabetes mellitus type 2 poorly controlled    Comments: Obese  Abdominal                  Anesthesia Plan:   ASA:  3  Plan:   MAC  Post-op Pain Management: IV analgesics  Informed Consent Plan and Risks Discussed With:  Patient      I have informed Tiff Rapp and/or legal guardian or family member of the nature of the anesthetic plan, benefits, risks including possible dental damage if relevant, major complications, and any alternative forms of anesthetic management.   All of the patient's questions were answered to the best of my ability. The patient desires the anesthetic management as planned.  Manuel Aguilar MD  2025 7:06 AM  Present on Admission:  **None**           [1]   Past Medical History:   Carpal tunnel syndrome of left wrist    Left Endoscopic Carpal Tunnel Release     Carpal tunnel syndrome on right    Chalazion left upper eyelid    Diabetes (HCC)    diet controlled, no meds    Disorder of liver    fatty liver    Disorder of thyroid    History of blood transfusion    @ [facility] over 20 yrs; no reactions    Migraine    Osteoarthritis    Problems with swallowing    upper dentures    Thyroid disease    Visual impairment    wears glases   [2]   Past Surgical History:  Procedure Laterality Date          Wrist arthroscop,release xvers lig  Right 01/15/2021    right endoscopic carpal tunnel release     Wrist arthroscop,release xvers lig Left 09/17/2021    Left Endoscopic Carpal Tunnel Release    [3]   Medications Prior to Admission   Medication Sig Dispense Refill Last Dose/Taking    levothyroxine 25 MCG Oral Tab Take 1 tablet (25 mcg total) by mouth before breakfast. 30 tablet 0 Taking    pregabalin 150 MG Oral Cap Take 1 capsule (150 mg total) by mouth 2 (two) times daily. 180 capsule 0 Taking    polyethylene glycol, PEG 3350-KCl-NaBcb-NaCl-NaSulf, 236 g Oral Recon Soln Take 4,000 mL by mouth As Directed. Take 2,000 mL the night before your procedure and 2,000 mL the morning of your procedure. 1 each 0    [4]   Current Facility-Administered Medications Ordered in Epic   Medication Dose Route Frequency Provider Last Rate Last Admin    lactated ringers infusion   Intravenous Continuous Ramon Andujar MD         No current [facility]-ordered outpatient medications on file.   [5] No Known Allergies  [6]   Family History  Problem Relation Age of Onset    Breast Cancer Mother 63    Diabetes Mother     Heart Disorder Father     Cancer Maternal Grandmother 60        unknown primary    Cancer Brother 50        throat/lung ca (smoker)    Diabetes Brother     Breast Cancer Maternal Cousin Female 38    Cancer Maternal Uncle 60        colon ca    Breast Cancer Paternal Cousin Female 51    Macular degeneration Neg     Glaucoma Neg    [7]   Social History  Socioeconomic History    Marital status: Single   Tobacco Use    Smoking status: Never    Smokeless tobacco: Never   Vaping Use    Vaping status: Never Used   Substance and Sexual Activity    Alcohol use: Never    Drug use: Never   Other Topics Concern    Right Handed Yes      No

## 2025-05-31 NOTE — DISCHARGE INSTRUCTIONS
Home Care Instructions for Colonoscopy with Sedation    Diet:  - Resume your regular diet as tolerated unless otherwise instructed.  - Start with light meals to minimize bloating.  - Do not drink alcohol today.    Medication:  - If you have questions about resuming your normal medications, please contact your Primary Care Physician.    Activities:  - Take it easy today. Do not return to work today.  - Do not drive today.  - Do not operate any machinery today (including kitchen equipment).    Colonoscopy:  - You may notice some rectal \"spotting\" (a little blood on the toilet tissue) for a day or two after the exam. This is normal.  - If you experience any rectal bleeding (not spotting), persistent tenderness or sharp severe abdominal pains, oral temperature over 100 degrees Fahrenheit, light-headedness or dizziness, or any other problems, contact your doctor.    **If unable to reach your doctor, please go to the [facility] Emergency Room**    - Your referring physician will receive a full report of your examination.  - If you do not hear from your doctor's office within two weeks of your biopsy, please call them for your results.    You may be able to see your laboratory results in [email or web address] between 4 and 7 business days.  In some cases, your physician may not have viewed the results before they are released to [email or web address].  If you have questions regarding your results contact the physician who ordered the test/exam by phone or via [email or web address] by choosing \"Ask a Medical Question.\"

## 2025-05-31 NOTE — H&P
History & Physical Examination    Patient Name: Tiff Rapp  MRN: [id number]  CSN: [id number]  YOB: 1965    Diagnosis: dysphagia, screening for colon cancer    Prescriptions Prior to Admission[1]  Current Hospital Medications[2]    Allergies: Allergies[3]    Past Medical History[4]  Past Surgical History[5]  Family History[6]  Social History     Tobacco Use    Smoking status: Never    Smokeless tobacco: Never   Substance Use Topics    Alcohol use: Never       SYSTEM Check if Review is Normal Check if Physical Exam is Normal If not normal, please explain:   HEENT [X ] [ X]    NECK  [X ] [ X]    HEART [X ] [ X]    LUNGS [X ] [ X]    ABDOMEN [X ] [ X]    EXTREMITIES [X ] [ X]    OTHER        I have discussed the risks and benefits and alternatives of the procedure with the patient/family.  They understand and agree to proceed with plan of care.   I have reviewed the History and Physical done within the last 30 days.  Any changes noted above.    Ramon Andujar MD  [facility]                   [1]   Medications Prior to Admission   Medication Sig Dispense Refill Last Dose/Taking    levothyroxine 25 MCG Oral Tab Take 1 tablet (25 mcg total) by mouth before breakfast. 30 tablet 0 Taking    pregabalin 150 MG Oral Cap Take 1 capsule (150 mg total) by mouth 2 (two) times daily. 180 capsule 0 Taking    polyethylene glycol, PEG 3350-KCl-NaBcb-NaCl-NaSulf, 236 g Oral Recon Soln Take 4,000 mL by mouth As Directed. Take 2,000 mL the night before your procedure and 2,000 mL the morning of your procedure. 1 each 0    [2]   Current Facility-Administered Medications   Medication Dose Route Frequency    lactated ringers infusion   Intravenous Continuous   [3] No Known Allergies  [4]   Past Medical History:   Carpal tunnel syndrome of left wrist    Left Endoscopic Carpal Tunnel Release     Carpal tunnel syndrome on right    Chalazion left upper eyelid    Diabetes (HCC)    diet controlled, no  meds    Disorder of liver    fatty liver    Disorder of thyroid    History of blood transfusion    @ [facility] over 20 yrs; no reactions    Migraine    Osteoarthritis    Problems with swallowing    upper dentures    Thyroid disease    Visual impairment    wears glases   [5]   Past Surgical History:  Procedure Laterality Date          Wrist arthroscop,release xvers lig Right 01/15/2021    right endoscopic carpal tunnel release     Wrist arthroscop,release xvers lig Left 2021    Left Endoscopic Carpal Tunnel Release    [6]   Family History  Problem Relation Age of Onset    Breast Cancer Mother 63    Diabetes Mother     Heart Disorder Father     Cancer Maternal Grandmother 60        unknown primary    Cancer Brother 50        throat/lung ca (smoker)    Diabetes Brother     Breast Cancer Maternal Cousin Female 38    Cancer Maternal Uncle 60        colon ca    Breast Cancer Paternal Cousin Female 51    Macular degeneration Neg     Glaucoma Neg

## 2025-06-03 NOTE — PROGRESS NOTES
No adenomas, bowel prep was fair.    Biopsies of the stomach and esophagus were negative/normal.    Repeat colonoscopy in 1 year.,

## 2025-06-16 ENCOUNTER — OFFICE VISIT (OUTPATIENT)
Dept: OCCUPATIONAL MEDICINE | Age: 60
End: 2025-06-16

## 2025-06-16 ENCOUNTER — WORKER'S COMP (OUTPATIENT)
Dept: OCCUPATIONAL MEDICINE | Age: 60
End: 2025-06-16

## 2025-06-16 VITALS
SYSTOLIC BLOOD PRESSURE: 146 MMHG | HEIGHT: 65 IN | WEIGHT: 226 LBS | HEART RATE: 60 BPM | BODY MASS INDEX: 37.65 KG/M2 | DIASTOLIC BLOOD PRESSURE: 101 MMHG
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
## 2025-06-16 DIAGNOSIS — S60.032A CONTUSION OF LEFT MIDDLE FINGER WITHOUT DAMAGE TO NAIL, INITIAL ENCOUNTER: Primary | ICD-10-CM

## 2025-06-16 DIAGNOSIS — M79.645 PAIN IN FINGER OF LEFT HAND: ICD-10-CM

## 2025-06-16 DIAGNOSIS — Z02.89 VISIT FOR OCCUPATIONAL HEALTH EXAMINATION: Primary | ICD-10-CM

## 2025-06-16 PROCEDURE — 99203 OFFICE O/P NEW LOW 30 MIN: CPT | Performed by: PHYSICIAN ASSISTANT

## 2025-06-16 PROCEDURE — A6453 SELF-ADHER BAND W <3"/YD: HCPCS | Performed by: PHYSICIAN ASSISTANT

## 2025-06-16 PROCEDURE — OH123 RAPID TEST DRUG KIT & COLLECTION 5 PANEL: Performed by: PHYSICIAN ASSISTANT

## 2025-06-16 RX ORDER — IBUPROFEN 800 MG/1
800 TABLET, FILM COATED ORAL PRN
COMMUNITY
Start: 2025-05-03

## 2025-06-16 RX ORDER — OMEPRAZOLE 40 MG/1
40 CAPSULE, DELAYED RELEASE ORAL DAILY
COMMUNITY
Start: 2025-05-31

## 2025-06-16 RX ORDER — PREGABALIN 150 MG/1
150 CAPSULE ORAL 2 TIMES DAILY
COMMUNITY
Start: 2025-05-20

## 2025-06-16 ASSESSMENT — PAIN SCALES - GENERAL: PAINLEVEL_OUTOF10: 5

## 2025-06-23 ENCOUNTER — APPOINTMENT (OUTPATIENT)
Dept: OCCUPATIONAL MEDICINE | Age: 60
End: 2025-06-23

## 2025-06-23 VITALS
HEIGHT: 65 IN | WEIGHT: 226 LBS | TEMPERATURE: 97.7 F | SYSTOLIC BLOOD PRESSURE: 130 MMHG | BODY MASS INDEX: 37.65 KG/M2 | HEART RATE: 56 BPM | DIASTOLIC BLOOD PRESSURE: 86 MMHG
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
## 2025-06-23 DIAGNOSIS — M79.645 PAIN IN FINGER OF LEFT HAND: ICD-10-CM
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
## 2025-06-23 DIAGNOSIS — S60.032D CONTUSION OF LEFT MIDDLE FINGER WITHOUT DAMAGE TO NAIL, SUBSEQUENT ENCOUNTER: Primary | ICD-10-CM

## 2025-06-23 PROCEDURE — A6453 SELF-ADHER BAND W <3"/YD: HCPCS | Performed by: PHYSICIAN ASSISTANT

## 2025-06-23 PROCEDURE — 99214 OFFICE O/P EST MOD 30 MIN: CPT | Performed by: PHYSICIAN ASSISTANT

## 2025-06-23 RX ORDER — PREGABALIN 150 MG/1
150 CAPSULE ORAL 2 TIMES DAILY
COMMUNITY

## 2025-06-23 RX ORDER — LEVOTHYROXINE SODIUM 25 UG/1
25 TABLET ORAL
COMMUNITY
Start: 2025-05-20

## 2025-06-23 RX ORDER — OMEPRAZOLE 40 MG/1
40 CAPSULE, DELAYED RELEASE ORAL
COMMUNITY
Start: 2025-05-31 | End: 2025-08-29

## 2025-06-23 ASSESSMENT — PAIN SCALES - GENERAL: PAINLEVEL_OUTOF10: 0

## 2025-06-30 ENCOUNTER — APPOINTMENT (OUTPATIENT)
Dept: OCCUPATIONAL MEDICINE | Age: 60
End: 2025-06-30

## 2025-06-30 VITALS — DIASTOLIC BLOOD PRESSURE: 83 MMHG | HEART RATE: 60 BPM | SYSTOLIC BLOOD PRESSURE: 130 MMHG | TEMPERATURE: 98.4 F
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
## 2025-06-30 DIAGNOSIS — S60.032D CONTUSION OF LEFT MIDDLE FINGER WITHOUT DAMAGE TO NAIL, SUBSEQUENT ENCOUNTER: Primary | ICD-10-CM

## 2025-06-30 DIAGNOSIS — M79.645 PAIN IN FINGER OF LEFT HAND: ICD-10-CM
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
## 2025-06-30 DIAGNOSIS — E03.9 HYPOTHYROIDISM, UNSPECIFIED TYPE: ICD-10-CM

## 2025-06-30 PROCEDURE — 99213 OFFICE O/P EST LOW 20 MIN: CPT | Performed by: PHYSICIAN ASSISTANT

## 2025-06-30 ASSESSMENT — PAIN SCALES - GENERAL: PAINLEVEL_OUTOF10: 0

## 2025-07-02 RX ORDER — LEVOTHYROXINE SODIUM 25 UG/1
25 TABLET ORAL
Qty: 10 TABLET | Refills: 0 | Status: SHIPPED | OUTPATIENT
Start: 2025-07-02

## 2025-07-10 ENCOUNTER — LAB ENCOUNTER (OUTPATIENT)
Dept: LAB | Facility: HOSPITAL | Age: 60
End: 2025-07-10
Attending: INTERNAL MEDICINE
Payer: MEDICAID

## 2025-07-10 ENCOUNTER — OFFICE VISIT (OUTPATIENT)
Facility: CLINIC | Age: 60
End: 2025-07-10

## 2025-07-10 DIAGNOSIS — B18.2 CHRONIC HEPATITIS C WITHOUT HEPATIC COMA (HCC): ICD-10-CM

## 2025-07-10 DIAGNOSIS — K76.0 FATTY LIVER: ICD-10-CM

## 2025-07-10 DIAGNOSIS — R74.8 ELEVATED LIVER ENZYMES: Primary | ICD-10-CM

## 2025-07-10 LAB
AFP-TM SERPL-MCNC: 40.6 NG/ML (ref ?–8)
ALBUMIN SERPL-MCNC: 4 G/DL (ref 3.2–4.8)
ALBUMIN/GLOB SERPL: 1.5 {RATIO} (ref 1–2)
ALP LIVER SERPL-CCNC: 90 U/L (ref 46–118)
ALT SERPL-CCNC: 43 U/L (ref 10–49)
ANION GAP SERPL CALC-SCNC: 11 MMOL/L (ref 0–18)
AST SERPL-CCNC: 56 U/L (ref ?–34)
BASOPHILS # BLD AUTO: 0.03 X10(3) UL (ref 0–0.2)
BASOPHILS NFR BLD AUTO: 0.4 %
BILIRUB SERPL-MCNC: 0.9 MG/DL (ref 0.3–1.2)
BUN BLD-MCNC: 8 MG/DL (ref 9–23)
BUN/CREAT SERPL: 9.2 (ref 10–20)
CALCIUM BLD-MCNC: 9 MG/DL (ref 8.7–10.4)
CERULOPLASMIN SERPL-MCNC: 27 MG/DL (ref 20–60)
CHLORIDE SERPL-SCNC: 104 MMOL/L (ref 98–112)
CO2 SERPL-SCNC: 27 MMOL/L (ref 21–32)
CREAT BLD-MCNC: 0.87 MG/DL (ref 0.55–1.02)
DEPRECATED HBV CORE AB SER IA-ACNC: 469 NG/ML (ref 50–306)
DEPRECATED RDW RBC AUTO: 41.6 FL (ref 35.1–46.3)
EGFRCR SERPLBLD CKD-EPI 2021: 78 ML/MIN/1.73M2 (ref 60–?)
EOSINOPHIL # BLD AUTO: 0.23 X10(3) UL (ref 0–0.7)
EOSINOPHIL NFR BLD AUTO: 3.4 %
ERYTHROCYTE [DISTWIDTH] IN BLOOD BY AUTOMATED COUNT: 12.9 % (ref 11–15)
FASTING STATUS PATIENT QL REPORTED: NO
GLOBULIN PLAS-MCNC: 2.7 G/DL (ref 2–3.5)
GLUCOSE BLD-MCNC: 107 MG/DL (ref 70–99)
HAV AB SER QL IA: REACTIVE
HAV IGM SER QL: NONREACTIVE
HBV CORE AB SERPL QL IA: NONREACTIVE
HBV SURFACE AB SER QL: NONREACTIVE
HBV SURFACE AB SERPL IA-ACNC: <3.1 MIU/ML
HBV SURFACE AG SER-ACNC: 0.21 [IU]/L
HBV SURFACE AG SERPL QL IA: NONREACTIVE
HCT VFR BLD AUTO: 41.4 % (ref 35–48)
HGB BLD-MCNC: 14.2 G/DL (ref 12–16)
IGA SERPL-MCNC: 101.1 MG/DL (ref 70–312)
IGM SERPL-MCNC: 153.4 MG/DL (ref 50–300)
IMM GRANULOCYTES # BLD AUTO: 0.01 X10(3) UL (ref 0–1)
IMM GRANULOCYTES NFR BLD: 0.1 %
IMMUNOGLOBULIN PNL SER-MCNC: 1461 MG/DL (ref 650–1600)
INR BLD: 1.05 (ref 0.8–1.2)
IRON SATN MFR SERPL: 26 % (ref 15–50)
IRON SERPL-MCNC: 86 UG/DL (ref 50–170)
LYMPHOCYTES # BLD AUTO: 2.6 X10(3) UL (ref 1–4)
LYMPHOCYTES NFR BLD AUTO: 38.5 %
MCH RBC QN AUTO: 30.5 PG (ref 26–34)
MCHC RBC AUTO-ENTMCNC: 34.3 G/DL (ref 31–37)
MCV RBC AUTO: 89 FL (ref 80–100)
MONOCYTES # BLD AUTO: 0.43 X10(3) UL (ref 0.1–1)
MONOCYTES NFR BLD AUTO: 6.4 %
NEUTROPHILS # BLD AUTO: 3.46 X10 (3) UL (ref 1.5–7.7)
NEUTROPHILS # BLD AUTO: 3.46 X10(3) UL (ref 1.5–7.7)
NEUTROPHILS NFR BLD AUTO: 51.2 %
OSMOLALITY SERPL CALC.SUM OF ELEC: 293 MOSM/KG (ref 275–295)
PLATELET # BLD AUTO: 228 10(3)UL (ref 150–450)
PLATELETS.RETICULATED NFR BLD AUTO: 2.3 % (ref 0–7)
POTASSIUM SERPL-SCNC: 3.3 MMOL/L (ref 3.5–5.1)
PROT SERPL-MCNC: 6.7 G/DL (ref 5.7–8.2)
PROTHROMBIN TIME: 14.3 SECONDS (ref 11.6–14.8)
RBC # BLD AUTO: 4.65 X10(6)UL (ref 3.8–5.3)
SODIUM SERPL-SCNC: 142 MMOL/L (ref 136–145)
TOTAL IRON BINDING CAPACITY: 330 UG/DL (ref 250–425)
TRANSFERRIN SERPL-MCNC: 252 MG/DL (ref 250–380)
WBC # BLD AUTO: 6.8 X10(3) UL (ref 4–11)

## 2025-07-10 PROCEDURE — 86225 DNA ANTIBODY NATIVE: CPT

## 2025-07-10 PROCEDURE — 82104 ALPHA-1-ANTITRYPSIN PHENO: CPT

## 2025-07-10 PROCEDURE — 82390 ASSAY OF CERULOPLASMIN: CPT | Performed by: INTERNAL MEDICINE

## 2025-07-10 PROCEDURE — 80053 COMPREHEN METABOLIC PANEL: CPT | Performed by: INTERNAL MEDICINE

## 2025-07-10 PROCEDURE — 82784 ASSAY IGA/IGD/IGG/IGM EACH: CPT | Performed by: INTERNAL MEDICINE

## 2025-07-10 PROCEDURE — 83540 ASSAY OF IRON: CPT | Performed by: INTERNAL MEDICINE

## 2025-07-10 PROCEDURE — 86708 HEPATITIS A ANTIBODY: CPT | Performed by: INTERNAL MEDICINE

## 2025-07-10 PROCEDURE — 82728 ASSAY OF FERRITIN: CPT | Performed by: INTERNAL MEDICINE

## 2025-07-10 PROCEDURE — 86704 HEP B CORE ANTIBODY TOTAL: CPT | Performed by: INTERNAL MEDICINE

## 2025-07-10 PROCEDURE — 83516 IMMUNOASSAY NONANTIBODY: CPT

## 2025-07-10 PROCEDURE — 85610 PROTHROMBIN TIME: CPT | Performed by: INTERNAL MEDICINE

## 2025-07-10 PROCEDURE — 82784 ASSAY IGA/IGD/IGG/IGM EACH: CPT

## 2025-07-10 PROCEDURE — 86706 HEP B SURFACE ANTIBODY: CPT | Performed by: INTERNAL MEDICINE

## 2025-07-10 PROCEDURE — 86709 HEPATITIS A IGM ANTIBODY: CPT | Performed by: INTERNAL MEDICINE

## 2025-07-10 PROCEDURE — 84466 ASSAY OF TRANSFERRIN: CPT | Performed by: INTERNAL MEDICINE

## 2025-07-10 PROCEDURE — 87389 HIV-1 AG W/HIV-1&-2 AB AG IA: CPT | Performed by: INTERNAL MEDICINE

## 2025-07-10 PROCEDURE — 82103 ALPHA-1-ANTITRYPSIN TOTAL: CPT

## 2025-07-10 PROCEDURE — 86364 TISS TRNSGLTMNASE EA IG CLAS: CPT

## 2025-07-10 PROCEDURE — 36415 COLL VENOUS BLD VENIPUNCTURE: CPT | Performed by: INTERNAL MEDICINE

## 2025-07-10 PROCEDURE — 82105 ALPHA-FETOPROTEIN SERUM: CPT | Performed by: INTERNAL MEDICINE

## 2025-07-10 PROCEDURE — 85025 COMPLETE CBC W/AUTO DIFF WBC: CPT | Performed by: INTERNAL MEDICINE

## 2025-07-10 PROCEDURE — 87340 HEPATITIS B SURFACE AG IA: CPT | Performed by: INTERNAL MEDICINE

## 2025-07-10 PROCEDURE — 86038 ANTINUCLEAR ANTIBODIES: CPT

## 2025-07-11 LAB
ACTIN SMOOTH MUSCLE AB: 13 UNITS
DSDNA IGG SERPL IA-ACNC: 1.1 IU/ML (ref ?–10)
ENA AB SER QL IA: 0.4 UG/L (ref ?–0.7)
ENA AB SER QL IA: NEGATIVE
M2 MITOCHONDRIAL AB: <20 UNITS
TTG IGA SER-ACNC: <0.2 U/ML (ref ?–7)

## 2025-07-17 LAB — A-1-ANTITRYPSIN: 166 MG/DL

## 2025-08-06 DIAGNOSIS — R74.8 ELEVATED LIVER ENZYMES: Primary | ICD-10-CM

## 2025-08-06 DIAGNOSIS — B18.2 CHRONIC HEPATITIS C WITHOUT HEPATIC COMA (HCC): ICD-10-CM

## 2025-08-20 ENCOUNTER — HOSPITAL ENCOUNTER (OUTPATIENT)
Dept: ULTRASOUND IMAGING | Facility: HOSPITAL | Age: 60
Discharge: HOME | End: 2025-08-20
Attending: INTERNAL MEDICINE

## 2025-08-20 DIAGNOSIS — R74.8 ELEVATED LIVER ENZYMES: ICD-10-CM

## 2025-08-20 DIAGNOSIS — K76.0 FATTY LIVER: ICD-10-CM

## 2025-08-20 DIAGNOSIS — B18.2 CHRONIC HEPATITIS C WITHOUT HEPATIC COMA (HCC): ICD-10-CM

## 2025-08-20 PROCEDURE — 76981 USE PARENCHYMA: CPT | Performed by: INTERNAL MEDICINE

## 2025-08-20 PROCEDURE — 76705 ECHO EXAM OF ABDOMEN: CPT | Performed by: INTERNAL MEDICINE

## (undated) DEVICE — GAMMEX® PI HYBRID SIZE 7, STERILE POWDER-FREE SURGICAL GLOVE, POLYISOPRENE AND NEOPRENE BLEND: Brand: GAMMEX

## (undated) DEVICE — MEDI-VAC NON-CONDUCTIVE SUCTION TUBING 6MM X 1.8M (6FT.) L: Brand: CARDINAL HEALTH

## (undated) DEVICE — KIT CLEAN ENDOKIT 1.1OZ GOWNX2

## (undated) DEVICE — SUTURE ETHILON 4-0 699G

## (undated) DEVICE — V2 SPECIMEN COLLECTION MANIFOLD KIT: Brand: NEPTUNE

## (undated) DEVICE — KIT ENDO ORCAPOD 160/180/190

## (undated) DEVICE — DISPOSABLE TOURNIQUET CUFF SINGLE BLADDER, DUAL PORT AND QUICK CONNECT CONNECTOR: Brand: COLOR CUFF

## (undated) DEVICE — SINGLE-USE SNARE: Brand: CAPTIVATOR™ COLD

## (undated) DEVICE — GIJAW SINGLE-USE BIOPSY FORCEPS WITH NEEDLE: Brand: GIJAW

## (undated) DEVICE — MEDI-VAC NON-CONDUCTIVE SUCTION TUBING: Brand: CARDINAL HEALTH

## (undated) DEVICE — YANKAUER,BULB TIP,W/O VENT,RIGID,STERILE: Brand: MEDLINE

## (undated) DEVICE — SOL  .9 1000ML BTL

## (undated) DEVICE — PLASTIC HAND: Brand: MEDLINE INDUSTRIES, INC.

## (undated) DEVICE — BANDAGE ROLL,100% COTTON, 6 PLY, SMALL: Brand: KERLIX

## (undated) DEVICE — ZIMMER® STERILE DISPOSABLE TOURNIQUET CUFF WITH PLC, DUAL PORT, SINGLE BLADDER, 24 IN. (61 CM)

## (undated) DEVICE — CONMED SCOPE SAVER BITE BLOCK, 20X27 MM: Brand: SCOPE SAVER

## (undated) DEVICE — ECTRA II PROCEDURE KIT,                                    SINGLE-USE, DISPOSABLE. CONTENTS                                    PROBE KNIFE, RETROGRADE KNIFE,                                    TRIANGLE KNIFE, HAND PAD, SWABS: Brand: ECTRA

## (undated) DEVICE — Device

## (undated) DEVICE — 60 ML SYRINGE REGULAR TIP: Brand: MONOJECT

## (undated) NOTE — LETTER
12/21/2020              Jackie Florian [address]         Dear Alan [facility],    [facility] records indicate that the tests ordered for you by Popeye Guallpa MD  have not been done.   If you have, in fact, already completed the

## (undated) NOTE — LETTER
8/10/2020          To Whom It May Concern:    Aria Quiroz is currently under my medical care and may not return to work at this time. Please excuse Agata Olmsted for 1 weeks. She may return to work on Monday, 8/17/2020.   Activity is restricted as foll

## (undated) NOTE — LETTER
21              Aria Quiroz  :  1965        To Whom It May Concern:     Return back to work starting Monday, 21 with sedentary work restrictions until follow up in four weeks.     No standing, bending, lifting and pulling more th

## (undated) NOTE — LETTER
20      Patient: Zeinab Blanco  : 1965 Visit date: 2020    Dear  Elisabeth Bess,      I examined your patient in consultation today. She has bilateral hand numbness, right worse than left, and right hand weakness.     She ha

## (undated) NOTE — LETTER
AUTHORIZATION FOR SURGICAL OPERATION OR OTHER PROCEDURE    1. I hereby authorize Dr. Sofia Lesches and the [facility] staff assigned to my case to perform the following operation and/or procedure at the [facility]:    _______________________________________________________________________________________________    Ultrasound guided right carpal tunnel injection with corticosteroid   _______________________________________________________________________________________________    2. My physician has explained the nature and purpose of the operation or other procedure, possible alternative methods of treatment, the risks involved, and the possibility of complication to me. I acknowledge that no guarantee has been made as to the result that may be obtained. 3.  I recognize that, during the course of this operation, or other procedure, unforseen conditions may necessitate additional or different procedure than those listed above. I, therefore, further authorize and request that the above named physician, his/her physician assistants or designees perform such procedures as are, in his/her professional opinion, necessary and desirable. 4.  Any tissue or organs removed in the operation or other procedure may be disposed of by and at the discretion of the [facility] staff and [facility] [place]. 5.  I understand that in the event of a medical emergency, I will be transported by local paramedics to [facility] or other hospital emergency department. 6.  I certify that I have read and fully understand the above consent to operation and/or other procedure. 7.  I acknowledge that my physician has explained sedation/analgesia administration to me including the risks and benefits. I consent to the administration of sedation/analgesia as may be necessary or desirable in the judgement of my physician.     Witness signature: ___________________________________________________ Date: ______/______/_____                    Time:  ________ A. M.  P.M. Patient Name:   Susan Aburto  [id number]  12/23/1965         Patient signature:  ___________________________________________________        Statement of Physician  My signature below affirms that prior to the time of the procedure, I have explained to the patient and/or his/her guardian, the risks and benefits involved in the proposed treatment and any reasonable alternative to the proposed treatment. I have also explained the risks and benefits involved in the refusal of the proposed treatment and have answered the patient's questions.                         Date:  ______/______/_______  Provider                      Signature:  __________________________________________________________       Time:  ___________ A.M    P.M.

## (undated) NOTE — LETTER
[facility]  Administration of Anesthesia  I, Tiff Rapp agree to be cared for by a physician anesthesiologist alone and/or with a nurse anesthetist, who is specially trained to monitor me and give me medicine to put me to sleep or keep me comfortable during my procedure    I understand that my anesthesiologist and/or anesthetist is not an employee or agent of [facility] or [employer]. He or she works for [facility]    As the patient asking for anesthesia services, I agree to:  Allow the anesthesiologist (anesthesia doctor) to give me medicine and do additional procedures as necessary. Some examples are: Starting or using an “IV” to give me medicine, fluids or blood during my procedure, and having a breathing tube placed to help me breathe when I’m asleep (intubation). In the event that my heart stops working properly, I understand that my anesthesiologist will make every effort to sustain my life, unless otherwise directed by [facility] Do Not Resuscitate documents.  Tell my anesthesia doctor before my procedure:  If I am pregnant.  The last time that I ate or drank.  iii. All of the medicines I take (including prescriptions, herbal supplements, and pills I can buy without a prescription (including street drugs/illegal medications). Failure to inform my anesthesiologist about these medicines may increase my risk of anesthetic complications.  iv.If I am allergic to anything or have had a reaction to anesthesia before.  I understand how the anesthesia medicine will help me (benefits).  I understand that with any type of anesthesia medicine there are risks:  The most common risks are: nausea, vomiting, sore throat, muscle soreness, damage to my eyes, mouth, or teeth (from breathing tube placement).  Rare risks include: remembering what happened during my procedure, allergic reactions to medications, injury to my airway, heart, lungs, vision, nerves, or  muscles and in extremely rare instances death.  My doctor has explained to me other choices available to me for my care (alternatives).  Pregnant Patients (“epidural”):  I understand that the risks of having an epidural (medicine given into my back to help control pain during labor), include itching, low blood pressure, difficulty urinating, headache or slowing of the baby’s heart. Very rare risks include infection, bleeding, seizure, irregular heart rhythms and nerve injury.  Regional Anesthesia (“spinal”, “epidural”, & “nerve blocks”):  I understand that rare but potential complications include headache, bleeding, infection, seizure, irregular heart rhythms, and nerve injury.    _____________________________________________________________________________  Patient (or Representative) Signature/Relationship to Patient  Date   Time    _____________________________________________________________________________   Name (if used)    Language/Organization   Time    _____________________________________________________________________________  Nurse Anesthetist Signature     Date   Time  _____________________________________________________________________________  Anesthesiologist Signature     Date   Time  I have discussed the procedure and information above with the patient (or patient’s representative) and answered their questions. The patient or their representative has agreed to have anesthesia services.    _____________________________________________________________________________  Witness        Date   Time  I have verified that the signature is that of the patient or patient’s representative, and that it was signed before the procedure  Patient Name: Tiff Rapp     : 1965                 Printed: 2025 at 8:17 AM    Medical Record #: [id number]                                            Page 1 of 1  ----------ANESTHESIA CONSENT----------

## (undated) NOTE — LETTER
21          Riri Whitman  :  1965      To Whom It May Concern: This patient was seen in our office on 21. Work status: May stay off of work until follow up appointment.      May return to work status per above effective 21

## (undated) NOTE — LETTER
Date: 2023      Patient Name: Khris Dill      : 1965        Thank you for choosing [facility] as your health care provider. Your physician has deemed the following medical service(s) necessary. However, your insurance plan may not pay for all of your health care and costs and may deny payment for this service. The fact that your insurance plan does not pay for an item or service does not mean you should not receive it. The purpose of this form is to help you make an informed decision about whether or not you want to receive this service(s) that may not be paid for by your insurance plan. CPT Code Description     Cost     Ultrasound guided right carpal tunnel injection with corticosteroid    I understand that the above mentioned service(s) or supply may not be covered by my insurance company.  I agree to be financially responsible for the cost of this service or supply in the event of my insurance denies payment as a non-covered benefit.        ______________________________________________________________________  Signature of Patient or Patient's Representative  Relationship  Date    ______________________________________________________________________  Signature of Witness to signing of form   Printed Name

## (undated) NOTE — LETTER
Date & Time: 5/26/2020, 6:57 PM  Patient: Will Leon  Encounter Provider(s):    SELENA Crane       To Whom It May Concern:    Dionne Baeza was seen and treated in our department on 5/26/2020. She may return to work on 5/29/2020.   Upon re

## (undated) NOTE — LETTER
5/16/2025          Tiff Rapp    [address]    [facility] [address]         Dear Tiff,    Our records indicate that the tests ordered for you by Ramon Andujar MD  have not been done.  If you have, in fact, already completed the tests or you do not wish to have the tests done, please contact our office at THE NUMBER LISTED BELOW.  Otherwise, please proceed with the testing.  Enclosed is a duplicate order for your convenience.  Imaging order from 4/15/2025  CT ABDOMEN+PELVIS(CONTRAST ONLY)(CPT=74177) (Order #[id number]) on 4/15/25     To schedule a test at any [facility], call Central Scheduling at [phone], Monday through Friday between 7:30am to 6pm and on Saturday between 8am and 1pm.   Evening and weekend appointments for your exam are available.         Sincerely,    Ramno Andujar MD  [facility], [address]  [facility] [id number] [phone]

## (undated) NOTE — LETTER
6/6/2020          To Whom It May Concern:    Priscila Godfrey is currently under my medical care and may not return to work at this time. Please excuse Rodriguez Fairbanks for 1 weeks. She may return to work on Monday, 6/15/2020 .   Activity is restricted as foll

## (undated) NOTE — ED AVS SNAPSHOT
Nishant Johnson   MRN: [id number]    Department:  [facility] Emergency Department   Date of Visit:  11/26/2018           Disclosure     Insurance plans vary and the physician(s) referred by the ER may not be covered by your plan.  Please conta CARE PHYSICIAN AT ONCE OR RETURN IMMEDIATELY TO THE EMERGENCY DEPARTMENT. If you have been prescribed any medication(s), please fill your prescription right away and begin taking the medication(s) as directed.   If you believe that any of the medications

## (undated) NOTE — LETTER
2/15/2023          To Whom It May Concern:    Leatha Zaman is currently under my medical care and may not return to work at this time. Please excuse Yao Noel for 2 days. She may return to work on Monday February 20, 2023 . Activity is restricted as follows: none. If you require additional information please contact our office. Sincerely,      Heena Damon.  Misti Agee MD

## (undated) NOTE — ED AVS SNAPSHOT
Alison Alvarez   MRN: [id number]    Department:  [facility] Emergency Department   Date of Visit:  4/1/2019           Disclosure     Insurance plans vary and the physician(s) referred by the ER may not be covered by your plan.  Please contact CARE PHYSICIAN AT ONCE OR RETURN IMMEDIATELY TO THE EMERGENCY DEPARTMENT. If you have been prescribed any medication(s), please fill your prescription right away and begin taking the medication(s) as directed.   If you believe that any of the medications

## (undated) NOTE — Clinical Note
Please advise patient to follow-up after MRI imaging of the cervical spine and we can discuss work restrictions after MRI imaging is completed.

## (undated) NOTE — LETTER
Date & Time: 11/13/2022, 8:26 PM  Patient: Susan Aburto  Encounter Provider(s):    Dianna Opitz, APRN       To Whom It May Concern:    Tiana Has was seen and treated in our department on 11/13/2022. She can return to work 11/17/22.     If you have any questions or concerns, please do not hesitate to call.        _____________________________  Physician/APC Signature

## (undated) NOTE — LETTER
23          Jarrett Carter  :  1965      To Whom It May Concern: This patient was seen in our office on 23 . If this office may be of further assistance, please do not hesitate to contact us.       Sincerely,    Adriane Suarez MD

## (undated) NOTE — ED AVS SNAPSHOT
[facility] Emergency Department    [address]     [address] [place] [address]    Phone:  [phone]    Fax:  [phone]           Will Leon   MRN: [id number]    Department:  [facility] Emergency Department   Date of Visit:  5/ and Class Registration line at [phone] or find a doctor online by visiting [email or web address].    IF THERE IS ANY CHANGE OR WORSENING OF YOUR CONDITION, CALL YOUR PRIMARY CARE PHYSICIAN AT ONCE OR RETURN IMMEDIATELY TO [address] [place] [address].     If

## (undated) NOTE — LETTER
[facility]  [address], [place], [place]    Authorization for Surgical Operation and Procedure                               I hereby authorize Ramon Andujar MD, my physician and his/her assistants (if applicable), which may include medical students, residents, and/or fellows, to perform the following surgical operation/ procedure and administer such anesthesia as may be determined necessary by my physician: Operation/Procedure name (s) COLONOSCOPY-SCREENING / ESOPHAGOGASTRODUODENOSCOPY on Tiff Rapp   2.   I recognize that during the surgical operation/procedure, unforeseen conditions may necessitate additional or different procedures than those listed above.  I, therefore, further authorize and request that the above-named surgeon, assistants, or designees perform such procedures as are, in their judgment, necessary and desirable.    3.   My surgeon/physician has discussed prior to my surgery the potential benefits, risks and side effects of this procedure; the likelihood of achieving goals; and potential problems that might occur during recuperation.  They also discussed reasonable alternatives to the procedure, including risks, benefits, and side effects related to the alternatives and risks related to not receiving this procedure.  I have had all my questions answered and I acknowledge that no guarantee has been made as to the result that may be obtained.    4.   Should the need arise during my operation/procedure, which includes change of level of care prior to discharge, I also consent to the administration of blood and/or blood products.  Further, I understand that despite careful testing and screening of blood or blood products by collecting agencies, I may still be subject to ill effects as a result of receiving a blood transfusion and/or blood products.  The following are some, but not all, of the potential risks that can occur: fever and allergic reactions, hemolytic  reactions, transmission of diseases such as Hepatitis, AIDS and Cytomegalovirus (CMV) and fluid overload.  In the event that I wish to have an autologous transfusion of my own blood, or a directed donor transfusion, I will discuss this with my physician.  Check only if Refusing Blood or Blood Products  I understand refusal of blood or blood products as deemed necessary by my physician may have serious consequences to my condition to include possible death. I hereby assume responsibility for my refusal and release the hospital, its personnel, and my physicians from any responsibility for the consequences of my refusal.    o  Refuse   5.   I authorize the use of any specimen, organs, tissues, body parts or foreign objects that may be removed from my body during the operation/procedure for diagnosis, research or teaching purposes and their subsequent disposal by hospital authorities.  I also authorize the release of specimen test results and/or written reports to my treating physician on the hospital medical staff or other referring or consulting physicians involved in my care, at the discretion of the Pathologist or my treating physician.    6.   I consent to the photographing or videotaping of the operations or procedures to be performed, including appropriate portions of my body for medical, scientific, or educational purposes, provided my identity is not revealed by the pictures or by descriptive texts accompanying them.  If the procedure has been photographed/videotaped, the surgeon will obtain the original picture, image, videotape or CD.  The hospital will not be responsible for storage, release or maintenance of the picture, image, tape or CD.    7.   I consent to the presence of a  or observers in the operating room as deemed necessary by my physician or their designees.    8.   I recognize that in the event my procedure results in extended X-Ray/fluoroscopy time, I may develop a skin  reaction.    9. If I have a Do Not Attempt Resuscitation (DNAR) order in place, that status will be suspended while in the operating room, procedural suite, and during the recovery period unless otherwise explicitly stated by me (or a person authorized to consent on my behalf). The surgeon or my attending physician will determine when the applicable recovery period ends for purposes of reinstating the DNAR order.  10. Patients having a sterilization procedure: I understand that if the procedure is successful the results will be permanent and it will therefore be impossible for me to inseminate, conceive, or bear children.  I also understand that the procedure is intended to result in sterility, although the result has not been guaranteed.   11. I acknowledge that my physician has explained sedation/analgesia administration to me including the risk and benefits I consent to the administration of sedation/analgesia as may be necessary or desirable in the judgment of my physician.    I CERTIFY THAT I HAVE READ AND FULLY UNDERSTAND THE ABOVE CONSENT TO OPERATION and/or OTHER PROCEDURE.     ____________________________________  _________________________________        ______________________________  Signature of Patient    Signature of Responsible Person                Printed Name of Responsible Person                                      ____________________________________  _____________________________                ________________________________  Signature of Witness        Date  Time         Relationship to Patient    STATEMENT OF PHYSICIAN My signature below affirms that prior to the time of the procedure; I have explained to the patient and/or his/her legal representative, the risks and benefits involved in the proposed treatment and any reasonable alternative to the proposed treatment. I have also explained the risks and benefits involved in refusal of the proposed treatment and alternatives to the proposed  treatment and have answered the patient's questions. If I have a significant financial interest in a co-management agreement or a significant financial interest in any product or implant, or other significant relationship used in this procedure/surgery, I have disclosed this and had a discussion with my patient.     _____________________________________________________              _____________________________  (Signature of Physician)                                                                                         (Date)                                   (Time)  Patient Name: Tiff Rapp      : 1965      Printed: 2025     Medical Record #: [id number]                                      Page 1 of 1

## (undated) NOTE — LETTER
Date & Time: 1/16/2019, 12:11 AM  Patient: Lelia Schultz  Encounter Provider(s):    Miguel Roberts MD       To Whom It May Concern:    Ilda Reddy was seen and treated in our department on 1/15/2019.  She should not return to work until 1/19/1

## (undated) NOTE — LETTER
Date: 2023      Patient Name: Cheri Valdivia      : 1965        Thank you for choosing [facility] as your health care provider. Your physician has deemed the following medical service(s) necessary. However, your insurance plan may not pay for all of your health care and costs and may deny payment for this service. The fact that your insurance plan does not pay for an item or service does not mean you should not receive it. The purpose of this form is to help you make an informed decision about whether or not you want to receive this service(s) that may not be paid for by your insurance plan. CPT Code Description     Cost     _________ Ultrasound guided right carpal tunnel injection with corticosteroid       I understand that the above mentioned service(s) or supply may not be covered by my insurance company.  I agree to be financially responsible for the cost of this service or supply in the event of my insurance denies payment as a non-covered benefit.        ______________________________________________________________________  Signature of Patient or Patient's Representative  Relationship  Date    ______________________________________________________________________  Signature of Witness to signing of form   Printed Name

## (undated) NOTE — LETTER
AUTHORIZATION FOR SURGICAL OPERATION OR OTHER PROCEDURE    1.  I hereby authorize Dr. Alyx Taylor and the [facility] staff assigned to my case to perform the following operation and/or procedure at the [facility]:    Ultrasound guided bilateral carpal tunnel inje ___________________________________________________             Relationship to Patient:           []  Parent    Responsible person                          []  Spouse  In case of minor or                    [] Other  _____________   Incompetent name:  ___

## (undated) NOTE — ED AVS SNAPSHOT
[facility] Emergency Department    [address]     [address] [place] [address]    Phone:  [phone]    Fax:  [phone]           Riri Whitman   MRN: [id number]    Department:  [facility] Emergency Department   Date of Visit:  4/ from exacerbating activities but do not stay sedentary. Follow up with your primary care provider within the next 1-3 days - if symptoms do not resolve, you may need further treatment.  Return to the ER immediately for numbness in your groin, loss of bowel a physician, you may call the [place] [address] Physician Referral and Class Registration line at [phone] or find a doctor online by visiting [email or web address].    IF THERE IS ANY CHANGE OR WORSENING OF YOUR CONDITION, [facility] Deneen Florez - If you are a smoker or have smoked in the last 12 months, we encourage you to explore options for quitting.     - If you have concerns related to behavioral health issues or thoughts of harming yourself, contact [facility] a

## (undated) NOTE — LETTER
AUTHORIZATION FOR SURGICAL OPERATION OR OTHER PROCEDURE    1. I hereby authorize Dr. Booker Day and the [facility] staff assigned to my case to perform the following operation and/or procedure at the [facility]:    Ultrasound guided bilateral carpal tunnel injections with corticosteroid    2. My physician has explained the nature and purpose of the operation or other procedure, possible alternative methods of treatment, the risks involved, and the possibility of complication to me. I acknowledge that no guarantee has been made as to the result that may be obtained. 3.  I recognize that, during the course of this operation, or other procedure, unforseen conditions may necessitate additional or different procedure than those listed above. I, therefore, further authorize and request that the above named physician, his/her physician assistants or designees perform such procedures as are, in his/her professional opinion, necessary and desirable. 4.  Any tissue or organs removed in the operation or other procedure may be disposed of by and at the discretion of the [facility] staff and [facility]. 5.  I understand that in the event of a medical emergency, I will be transported by local paramedics to [facility] or other [facility] emergency department. 6.  I certify that I have read and fully understand the above consent to operation and/or other procedure. 7.  I acknowledge that my physician has explained sedation/analgesia administration to me including the risks and benefits. I consent to the administration of sedation/analgesia as may be necessary or desirable in the judgement of my physician. Witness signature: ___________________________________________________ Date:  ______/______/_____                    Time:  ________ A. M.  P.M.        Patient Name:  Noris Wood  12/23/1965  [id number]          Patient signature: ___________________________________________________               Statement of Physician  My signature below affirms that prior to the time of the procedure, I have explained to the patient and/or his/her guardian, the risks and benefits involved in the proposed treatment and any reasonable alternative to the proposed treatment. I have also explained the risks and benefits involved in the refusal of the proposed treatment and have answered the patient's questions.                         Date:  ______/______/_______  Provider                      Signature:  __________________________________________________________       Time:  ___________ ANEETU VARMA

## (undated) NOTE — ED AVS SNAPSHOT
Will Leon   MRN: [id number]    Department:  [facility] Emergency Department   Date of Visit:  1/15/2019           Disclosure     Insurance plans vary and the physician(s) referred by the ER may not be covered by your plan.  Please contac CARE PHYSICIAN AT ONCE OR RETURN IMMEDIATELY TO THE EMERGENCY DEPARTMENT. If you have been prescribed any medication(s), please fill your prescription right away and begin taking the medication(s) as directed.   If you believe that any of the medications

## (undated) NOTE — LETTER
Date: 2022      Patient Name: Toro Hernandez      : 1965        Thank you for choosing [facility] [address] as your health care provider. Your physician has deemed the following medical service(s) necessary. However, your insurance plan may not pay for all of your health care and costs and may deny payment for this service. The fact that your insurance plan does not pay for an item or service does not mean you should not receive it. The purpose of this form is to help you make an informed decision about whether or not you want to receive this service(s) that may not be paid for by your insurance plan. CPT Code Description     Cost     _________ Ultrasound guided bilateral carpal tunnel injections with corticosteroid __________    _________ ______________________________ _____________      _________ ______________________________ _____________      I understand that the above mentioned service(s) or supply may not be covered by my insurance company.  I agree to be financially responsible for the cost of this service or supply in the event of my insurance denies payment as a non-covered benefit.        ______________________________________________________________________  Signature of Patient or Patient's Representative  Relationship  Date    ______________________________________________________________________  Signature of Witness to signing of form   Printed Name

## (undated) NOTE — LETTER
Date & Time: 4/2/2019, 12:02 AM  Patient: Martha Walden  Encounter Provider(s):    Luis M Maya MD       To Whom It May Concern:    Willima Wells was seen and treated in our department on 4/1/2019. She should not return to work until 4/5/2019.

## (undated) NOTE — LETTER
21          Zeinab Blanco  :  1965      To Whom It May Concern:    Return back to work starting Monday, 21 with sedentary work restrictions until follow up in four weeks.     If this office may be of further assistance, please do not

## (undated) NOTE — ED AVS SNAPSHOT
Abhishek Wilson   MRN: [id number]    Department:  [facility] Emergency Department   Date of Visit:  8/18/2018           Disclosure     Insurance plans vary and the physician(s) referred by the ER may not be covered by your plan.  Please contac CARE PHYSICIAN AT ONCE OR RETURN IMMEDIATELY TO THE EMERGENCY DEPARTMENT. If you have been prescribed any medication(s), please fill your prescription right away and begin taking the medication(s) as directed.   If you believe that any of the medications

## (undated) NOTE — LETTER
Date & Time: 4/16/2020, 7:44 PM  Patient: Leela Rodriguez  Encounter Provider(s):    Jerome Kessler Attending  Sasha Duran MD       To Whom It May Concern:    Benjamin Wells was seen and treated in our department on 4/16/2020.  She can return to wor

## (undated) NOTE — ED AVS SNAPSHOT
[facility] Emergency Department    [address]     [address] [place] [address]    Phone:  [phone]    Fax:  [phone]           Vaishali Kinney   MRN: [id number]    Department:  [facility] Emergency Department   Date of Visit:  5/ related to the care you received in our emergency department. Please call our [address] at [phone]. Your Emergency Department team is here to serve you. You are our top priority. You were examined and treated today on an urgent basis only.   Tae Canela that these instructions have been explained to me; all questions pertaining to these instructions have been answered in a satisfactory manner. [facility]        Pharmacy Address Phone Number   [address] ([address] [address] Enter your [employer] Activation Code exactly as it appears below along with your Zip Code and Date of Birth to complete the sign-up process. If you do not sign up before the expiration date, you must request a new code.     Your unique [employer] Access Code: [email or web address]

## (undated) NOTE — LETTER
20      Patient: Carin Chandler  : 1965 Visit date: 2020    Dear Sonu Harrison,      I examined your patient in consultation today.     Electrodiagnostic studies reveal severe bilateral carpal tunnel syndrome, with denervation

## (undated) NOTE — Clinical Note
Patient needs a work note to return back to work starting Monday 12/6 with sedentary work duty restrictions until follow-up in 4 weeks. Please provide her physical therapy information at [facility] [address] [facility].   Please set her up for cervical epidural mesha

## (undated) NOTE — LETTER
AUTHORIZATION FOR SURGICAL OPERATION OR OTHER PROCEDURE    1. I hereby authorize Dr. Joellen Valle and the [facility] staff assigned to my case to perform the following operation and/or procedure at the [facility]:    Ultrasound guided right carpal tunnel injection with corticosteroid    2. My physician has explained the nature and purpose of the operation or other procedure, possible alternative methods of treatment, the risks involved, and the possibility of complication to me. I acknowledge that no guarantee has been made as to the result that may be obtained. 3.  I recognize that, during the course of this operation, or other procedure, unforseen conditions may necessitate additional or different procedure than those listed above. I, therefore, further authorize and request that the above named physician, his/her physician assistants or designees perform such procedures as are, in his/her professional opinion, necessary and desirable. 4.  Any tissue or organs removed in the operation or other procedure may be disposed of by and at the discretion of the [facility] staff and [facility] AT [facility]. 5.  I understand that in the event of a medical emergency, I will be transported by local paramedics to [facility] or other hospital emergency department. 6.  I certify that I have read and fully understand the above consent to operation and/or other procedure. 7.  I acknowledge that my physician has explained sedation/analgesia administration to me including the risks and benefits. I consent to the administration of sedation/analgesia as may be necessary or desirable in the judgement of my physician. Witness signature: ___________________________________________________ Date:  ______/______/_____                    Time:  ________ A. M.  P.ADI Engel Both  12/23/1965  [id number]         Patient signature: ___________________________________________________             Statement of Physician  My signature below affirms that prior to the time of the procedure, I have explained to the patient and/or his/her guardian, the risks and benefits involved in the proposed treatment and any reasonable alternative to the proposed treatment. I have also explained the risks and benefits involved in the refusal of the proposed treatment and have answered the patient's questions.                         Date:  ______/______/_______  Provider                      Signature:  __________________________________________________________       Time:  ___________ ANEETU VARMA

## (undated) NOTE — ED AVS SNAPSHOT
[facility] Emergency Department    [address]     [address] [place] [address]    Phone:  [phone]    Fax:  [phone]           Vane Cortez   MRN: [id number]    Department:  [facility] Emergency Department   Date of Visit:  4/ and Class Registration line at [phone] or find a doctor online by visiting [email or web address].    IF THERE IS ANY CHANGE OR WORSENING OF YOUR CONDITION, CALL YOUR PRIMARY CARE PHYSICIAN AT ONCE OR RETURN IMMEDIATELY TO [address] [place] [address].     If

## (undated) NOTE — LETTER
22          Virgil De  :  1965      To Whom It May Concern: This patient was seen in our office on 22. May return to work full-time with no restrictions effective 22. If this office may be of further assistance, please do not hesitate to contact us. Sincerely,        Melly Mccullough.  DO Sancho.

## (undated) NOTE — LETTER
Patient: Tiff Rapp   YOB: 1965   Date of Visit: 8/21/2024     Dear School Adminstrator,      August 21, 2024    At [facility], we are taking special precautions and doing everything we can to prevent the spread of COVID-19. During this time, we ask for your assistance regarding physician documentation for patients to return to school, sports or activities following a respiratory illness.     We recommend that you do not require a healthcare provider’s note for patients to validate their illness or their return to activity as healthcare provider offices may be extremely busy and not able to provide documentation in a timely manner. We recommend utilizing the following CDC recommendations to determine the appropriate time frame for return after either a documented diagnosis or a COVID-19 exposure:    Individuals with COVID-19 symptoms directed to care for themselves at home should:    Isolate for five days. If individuals are asymptomatic or symptoms are resolving (without fever for 24 hours), isolation may be discontinued on day six. Onset of symptoms is considered day zero.   Follow isolation by five days of mask wearing when around others to minimize the risk of infection.     Individuals infected with SARS-CoV-2 who never develop COVID-19 symptoms:    Day of positive test is considered day zero.   Isolate for five days.   Can discontinue isolation on day six.   Follow isolation by five days of mask wearing when around others to minimize the risk of infection.    Individuals who are asymptomatic but have been exposed:  For people who are unvaccinated or are more than six months out from their second mRNA dose (or more than 2 months after the J&J vaccine) and not yet boosted, CDC now recommends quarantine for five days followed by strict, mask use for an additional five days. Alternatively, if a five-day quarantine is not feasible, it is imperative that an exposed person wear a  well-fitting mask at all times when around others for 10 days after exposure.   Individuals who have received their booster shot do not need to quarantine following an exposure but should wear a mask for 10 days after the exposure.    Best practice would also include a test on day five after exposure.   If symptoms occur, individuals should immediately quarantine until a negative test confirms symptoms are not attributable to COVID-19.    Please visit the CDC website for further information and details to assist you during this challenging time.       Sincerely,     Aldo Cee, DO

## (undated) NOTE — ED AVS SNAPSHOT
Rosalina Galvez   MRN: [id number]    Department:  [facility] Emergency Department   Date of Visit:  3/6/2018           Disclosure     Insurance plans vary and the physician(s) referred by the ER may not be covered by your plan.  Please contact CARE PHYSICIAN AT ONCE OR RETURN IMMEDIATELY TO THE EMERGENCY DEPARTMENT. If you have been prescribed any medication(s), please fill your prescription right away and begin taking the medication(s) as directed.   If you believe that any of the medications

## (undated) NOTE — LETTER
21      Patient: Zeinab Blanco  : 1965 Visit date: 2021    Dear Elisabeth Bess,      I examined your patient in follow-up today. She has worsening of her left carpal tunnel symptoms.   We will plan on left endoscopic carpal